# Patient Record
Sex: FEMALE | Race: WHITE | NOT HISPANIC OR LATINO
[De-identification: names, ages, dates, MRNs, and addresses within clinical notes are randomized per-mention and may not be internally consistent; named-entity substitution may affect disease eponyms.]

---

## 2022-01-01 ENCOUNTER — TRANSCRIPTION ENCOUNTER (OUTPATIENT)
Age: 87
End: 2022-01-01

## 2022-01-01 ENCOUNTER — RESULT REVIEW (OUTPATIENT)
Age: 87
End: 2022-01-01

## 2022-01-01 ENCOUNTER — INPATIENT (INPATIENT)
Facility: HOSPITAL | Age: 87
LOS: 9 days | Discharge: NURSING, REHAB OR LONG-TERM CARE | End: 2023-01-05
Attending: INTERNAL MEDICINE | Admitting: INTERNAL MEDICINE
Payer: MEDICARE

## 2022-01-01 ENCOUNTER — INPATIENT (INPATIENT)
Facility: HOSPITAL | Age: 87
LOS: 6 days | Discharge: HOME HEALTH | End: 2022-12-01
Attending: INTERNAL MEDICINE | Admitting: INTERNAL MEDICINE

## 2022-01-01 ENCOUNTER — INPATIENT (INPATIENT)
Facility: HOSPITAL | Age: 87
LOS: 3 days | Discharge: NURSING, REHAB OR LONG-TERM CARE | End: 2022-12-12
Attending: STUDENT IN AN ORGANIZED HEALTH CARE EDUCATION/TRAINING PROGRAM | Admitting: STUDENT IN AN ORGANIZED HEALTH CARE EDUCATION/TRAINING PROGRAM

## 2022-01-01 ENCOUNTER — APPOINTMENT (OUTPATIENT)
Dept: GASTROENTEROLOGY | Facility: CLINIC | Age: 87
End: 2022-01-01

## 2022-01-01 VITALS
OXYGEN SATURATION: 94 % | HEART RATE: 49 BPM | TEMPERATURE: 96 F | RESPIRATION RATE: 20 BRPM | SYSTOLIC BLOOD PRESSURE: 96 MMHG | DIASTOLIC BLOOD PRESSURE: 66 MMHG | HEIGHT: 55 IN

## 2022-01-01 VITALS
RESPIRATION RATE: 17 BRPM | TEMPERATURE: 98 F | DIASTOLIC BLOOD PRESSURE: 68 MMHG | SYSTOLIC BLOOD PRESSURE: 132 MMHG | OXYGEN SATURATION: 100 % | HEART RATE: 69 BPM

## 2022-01-01 VITALS
OXYGEN SATURATION: 97 % | DIASTOLIC BLOOD PRESSURE: 66 MMHG | SYSTOLIC BLOOD PRESSURE: 119 MMHG | TEMPERATURE: 98 F | RESPIRATION RATE: 16 BRPM | HEART RATE: 103 BPM

## 2022-01-01 VITALS
HEIGHT: 55 IN | OXYGEN SATURATION: 98 % | HEART RATE: 78 BPM | TEMPERATURE: 97 F | RESPIRATION RATE: 18 BRPM | SYSTOLIC BLOOD PRESSURE: 133 MMHG | DIASTOLIC BLOOD PRESSURE: 90 MMHG

## 2022-01-01 VITALS
BODY MASS INDEX: 23.37 KG/M2 | HEIGHT: 55 IN | SYSTOLIC BLOOD PRESSURE: 126 MMHG | OXYGEN SATURATION: 95 % | HEART RATE: 92 BPM | TEMPERATURE: 98.2 F | WEIGHT: 101 LBS | DIASTOLIC BLOOD PRESSURE: 73 MMHG

## 2022-01-01 VITALS
OXYGEN SATURATION: 100 % | TEMPERATURE: 97 F | HEART RATE: 69 BPM | SYSTOLIC BLOOD PRESSURE: 150 MMHG | DIASTOLIC BLOOD PRESSURE: 57 MMHG | RESPIRATION RATE: 20 BRPM

## 2022-01-01 DIAGNOSIS — K92.1 MELENA: ICD-10-CM

## 2022-01-01 DIAGNOSIS — E05.90 THYROTOXICOSIS, UNSPECIFIED WITHOUT THYROTOXIC CRISIS OR STORM: ICD-10-CM

## 2022-01-01 DIAGNOSIS — I50.9 HEART FAILURE, UNSPECIFIED: ICD-10-CM

## 2022-01-01 DIAGNOSIS — D64.9 ANEMIA, UNSPECIFIED: ICD-10-CM

## 2022-01-01 DIAGNOSIS — J10.1 INFLUENZA DUE TO OTHER IDENTIFIED INFLUENZA VIRUS WITH OTHER RESPIRATORY MANIFESTATIONS: ICD-10-CM

## 2022-01-01 DIAGNOSIS — I48.91 UNSPECIFIED ATRIAL FIBRILLATION: ICD-10-CM

## 2022-01-01 DIAGNOSIS — K52.9 NONINFECTIVE GASTROENTERITIS AND COLITIS, UNSPECIFIED: ICD-10-CM

## 2022-01-01 DIAGNOSIS — Z98.49 CATARACT EXTRACTION STATUS, UNSPECIFIED EYE: Chronic | ICD-10-CM

## 2022-01-01 DIAGNOSIS — R55 SYNCOPE AND COLLAPSE: ICD-10-CM

## 2022-01-01 DIAGNOSIS — E03.9 HYPOTHYROIDISM, UNSPECIFIED: ICD-10-CM

## 2022-01-01 DIAGNOSIS — E43 UNSPECIFIED SEVERE PROTEIN-CALORIE MALNUTRITION: ICD-10-CM

## 2022-01-01 DIAGNOSIS — L03.90 CELLULITIS, UNSPECIFIED: ICD-10-CM

## 2022-01-01 DIAGNOSIS — K64.9 UNSPECIFIED HEMORRHOIDS: ICD-10-CM

## 2022-01-01 DIAGNOSIS — K35.32 ACUTE APPENDICITIS WITH PERFORATION, LOCALIZED PERITONITIS, AND GANGRENE, WITHOUT ABSCESS: Chronic | ICD-10-CM

## 2022-01-01 DIAGNOSIS — K59.09 OTHER CONSTIPATION: ICD-10-CM

## 2022-01-01 DIAGNOSIS — Z90.49 ACQUIRED ABSENCE OF OTHER SPECIFIED PARTS OF DIGESTIVE TRACT: Chronic | ICD-10-CM

## 2022-01-01 DIAGNOSIS — I87.2 VENOUS INSUFFICIENCY (CHRONIC) (PERIPHERAL): ICD-10-CM

## 2022-01-01 DIAGNOSIS — D72.829 ELEVATED WHITE BLOOD CELL COUNT, UNSPECIFIED: ICD-10-CM

## 2022-01-01 DIAGNOSIS — R74.8 ABNORMAL LEVELS OF OTHER SERUM ENZYMES: ICD-10-CM

## 2022-01-01 DIAGNOSIS — Z29.9 ENCOUNTER FOR PROPHYLACTIC MEASURES, UNSPECIFIED: ICD-10-CM

## 2022-01-01 DIAGNOSIS — R13.10 DYSPHAGIA, UNSPECIFIED: ICD-10-CM

## 2022-01-01 DIAGNOSIS — Z71.89 OTHER SPECIFIED COUNSELING: ICD-10-CM

## 2022-01-01 DIAGNOSIS — I73.9 PERIPHERAL VASCULAR DISEASE, UNSPECIFIED: ICD-10-CM

## 2022-01-01 DIAGNOSIS — I99.8 OTHER DISORDER OF CIRCULATORY SYSTEM: ICD-10-CM

## 2022-01-01 DIAGNOSIS — R20.9 UNSPECIFIED DISTURBANCES OF SKIN SENSATION: ICD-10-CM

## 2022-01-01 DIAGNOSIS — K92.2 GASTROINTESTINAL HEMORRHAGE, UNSPECIFIED: ICD-10-CM

## 2022-01-01 DIAGNOSIS — J11.1 INFLUENZA DUE TO UNIDENTIFIED INFLUENZA VIRUS WITH OTHER RESPIRATORY MANIFESTATIONS: ICD-10-CM

## 2022-01-01 DIAGNOSIS — E78.5 HYPERLIPIDEMIA, UNSPECIFIED: ICD-10-CM

## 2022-01-01 DIAGNOSIS — R05.8 OTHER SPECIFIED COUGH: ICD-10-CM

## 2022-01-01 LAB
A1C WITH ESTIMATED AVERAGE GLUCOSE RESULT: 5.4 % — SIGNIFICANT CHANGE UP (ref 4–5.6)
ALBUMIN SERPL ELPH-MCNC: 1.9 G/DL — LOW (ref 3.3–5)
ALBUMIN SERPL ELPH-MCNC: 2.1 G/DL — LOW (ref 3.3–5)
ALBUMIN SERPL ELPH-MCNC: 2.5 G/DL — LOW (ref 3.3–5)
ALBUMIN SERPL ELPH-MCNC: 2.7 G/DL — LOW (ref 3.3–5)
ALBUMIN SERPL ELPH-MCNC: 3.4 G/DL — SIGNIFICANT CHANGE UP (ref 3.3–5)
ALBUMIN SERPL ELPH-MCNC: 3.5 G/DL — SIGNIFICANT CHANGE UP (ref 3.3–5)
ALBUMIN SERPL ELPH-MCNC: 3.7 G/DL — SIGNIFICANT CHANGE UP (ref 3.3–5)
ALP SERPL-CCNC: 142 U/L — HIGH (ref 40–120)
ALP SERPL-CCNC: 163 U/L — HIGH (ref 40–120)
ALP SERPL-CCNC: 163 U/L — HIGH (ref 40–120)
ALP SERPL-CCNC: 171 U/L — HIGH (ref 40–120)
ALP SERPL-CCNC: 80 U/L — SIGNIFICANT CHANGE UP (ref 40–120)
ALP SERPL-CCNC: 84 U/L — SIGNIFICANT CHANGE UP (ref 40–120)
ALP SERPL-CCNC: 84 U/L — SIGNIFICANT CHANGE UP (ref 40–120)
ALT FLD-CCNC: 10 U/L — SIGNIFICANT CHANGE UP (ref 4–33)
ALT FLD-CCNC: 11 U/L — SIGNIFICANT CHANGE UP (ref 4–33)
ALT FLD-CCNC: 12 U/L — SIGNIFICANT CHANGE UP (ref 4–33)
ALT FLD-CCNC: 13 U/L — SIGNIFICANT CHANGE UP (ref 4–33)
ALT FLD-CCNC: 25 U/L — SIGNIFICANT CHANGE UP (ref 4–33)
ALT FLD-CCNC: 32 U/L — SIGNIFICANT CHANGE UP (ref 4–33)
ALT FLD-CCNC: 32 U/L — SIGNIFICANT CHANGE UP (ref 4–33)
ANION GAP SERPL CALC-SCNC: 10 MMOL/L — SIGNIFICANT CHANGE UP (ref 7–14)
ANION GAP SERPL CALC-SCNC: 11 MMOL/L — SIGNIFICANT CHANGE UP (ref 7–14)
ANION GAP SERPL CALC-SCNC: 12 MMOL/L — SIGNIFICANT CHANGE UP (ref 7–14)
ANION GAP SERPL CALC-SCNC: 12 MMOL/L — SIGNIFICANT CHANGE UP (ref 7–14)
ANION GAP SERPL CALC-SCNC: 13 MMOL/L — SIGNIFICANT CHANGE UP (ref 7–14)
ANION GAP SERPL CALC-SCNC: 13 MMOL/L — SIGNIFICANT CHANGE UP (ref 7–14)
ANION GAP SERPL CALC-SCNC: 14 MMOL/L — SIGNIFICANT CHANGE UP (ref 7–14)
ANION GAP SERPL CALC-SCNC: 14 MMOL/L — SIGNIFICANT CHANGE UP (ref 7–14)
ANION GAP SERPL CALC-SCNC: 15 MMOL/L — HIGH (ref 7–14)
ANION GAP SERPL CALC-SCNC: 17 MMOL/L — HIGH (ref 7–14)
ANION GAP SERPL CALC-SCNC: 5 MMOL/L — LOW (ref 7–14)
ANION GAP SERPL CALC-SCNC: 5 MMOL/L — LOW (ref 7–14)
ANION GAP SERPL CALC-SCNC: 7 MMOL/L — SIGNIFICANT CHANGE UP (ref 7–14)
ANION GAP SERPL CALC-SCNC: 8 MMOL/L — SIGNIFICANT CHANGE UP (ref 7–14)
APPEARANCE UR: CLEAR — SIGNIFICANT CHANGE UP
APTT BLD: 34.8 SEC — SIGNIFICANT CHANGE UP (ref 27–36.3)
APTT BLD: 38.9 SEC — HIGH (ref 27–36.3)
AST SERPL-CCNC: 25 U/L — SIGNIFICANT CHANGE UP (ref 4–32)
AST SERPL-CCNC: 30 U/L — SIGNIFICANT CHANGE UP (ref 4–32)
AST SERPL-CCNC: 31 U/L — SIGNIFICANT CHANGE UP (ref 4–32)
AST SERPL-CCNC: 35 U/L — HIGH (ref 4–32)
AST SERPL-CCNC: 37 U/L — HIGH (ref 4–32)
AST SERPL-CCNC: 44 U/L — HIGH (ref 4–32)
AST SERPL-CCNC: 63 U/L — HIGH (ref 4–32)
B PERT DNA SPEC QL NAA+PROBE: SIGNIFICANT CHANGE UP
B PERT DNA SPEC QL NAA+PROBE: SIGNIFICANT CHANGE UP
B PERT+PARAPERT DNA PNL SPEC NAA+PROBE: SIGNIFICANT CHANGE UP
B PERT+PARAPERT DNA PNL SPEC NAA+PROBE: SIGNIFICANT CHANGE UP
BASE EXCESS BLDV CALC-SCNC: 7.6 MMOL/L — HIGH (ref -2–3)
BASOPHILS # BLD AUTO: 0.02 K/UL — SIGNIFICANT CHANGE UP (ref 0–0.2)
BASOPHILS # BLD AUTO: 0.03 K/UL — SIGNIFICANT CHANGE UP (ref 0–0.2)
BASOPHILS # BLD AUTO: 0.04 K/UL — SIGNIFICANT CHANGE UP (ref 0–0.2)
BASOPHILS NFR BLD AUTO: 0.1 % — SIGNIFICANT CHANGE UP (ref 0–2)
BASOPHILS NFR BLD AUTO: 0.2 % — SIGNIFICANT CHANGE UP (ref 0–2)
BASOPHILS NFR BLD AUTO: 0.4 % — SIGNIFICANT CHANGE UP (ref 0–2)
BASOPHILS NFR BLD AUTO: 0.5 % — SIGNIFICANT CHANGE UP (ref 0–2)
BILIRUB SERPL-MCNC: 0.3 MG/DL — SIGNIFICANT CHANGE UP (ref 0.2–1.2)
BILIRUB SERPL-MCNC: 0.4 MG/DL — SIGNIFICANT CHANGE UP (ref 0.2–1.2)
BILIRUB UR-MCNC: NEGATIVE — SIGNIFICANT CHANGE UP
BLD GP AB SCN SERPL QL: NEGATIVE — SIGNIFICANT CHANGE UP
BLOOD GAS VENOUS COMPREHENSIVE RESULT: SIGNIFICANT CHANGE UP
BORDETELLA PARAPERTUSSIS (RAPRVP): SIGNIFICANT CHANGE UP
BORDETELLA PARAPERTUSSIS (RAPRVP): SIGNIFICANT CHANGE UP
BUN SERPL-MCNC: 10 MG/DL — SIGNIFICANT CHANGE UP (ref 7–23)
BUN SERPL-MCNC: 12 MG/DL — SIGNIFICANT CHANGE UP (ref 7–23)
BUN SERPL-MCNC: 12 MG/DL — SIGNIFICANT CHANGE UP (ref 7–23)
BUN SERPL-MCNC: 14 MG/DL — SIGNIFICANT CHANGE UP (ref 7–23)
BUN SERPL-MCNC: 15 MG/DL — SIGNIFICANT CHANGE UP (ref 7–23)
BUN SERPL-MCNC: 18 MG/DL — SIGNIFICANT CHANGE UP (ref 7–23)
BUN SERPL-MCNC: 18 MG/DL — SIGNIFICANT CHANGE UP (ref 7–23)
BUN SERPL-MCNC: 20 MG/DL — SIGNIFICANT CHANGE UP (ref 7–23)
BUN SERPL-MCNC: 21 MG/DL — SIGNIFICANT CHANGE UP (ref 7–23)
BUN SERPL-MCNC: 22 MG/DL — SIGNIFICANT CHANGE UP (ref 7–23)
BUN SERPL-MCNC: 4 MG/DL — LOW (ref 7–23)
BUN SERPL-MCNC: 4 MG/DL — LOW (ref 7–23)
BUN SERPL-MCNC: 5 MG/DL — LOW (ref 7–23)
BUN SERPL-MCNC: 6 MG/DL — LOW (ref 7–23)
BUN SERPL-MCNC: 7 MG/DL — SIGNIFICANT CHANGE UP (ref 7–23)
BUN SERPL-MCNC: 7 MG/DL — SIGNIFICANT CHANGE UP (ref 7–23)
BUN SERPL-MCNC: 8 MG/DL — SIGNIFICANT CHANGE UP (ref 7–23)
BUN SERPL-MCNC: 9 MG/DL — SIGNIFICANT CHANGE UP (ref 7–23)
C DIFF BY PCR RESULT: SIGNIFICANT CHANGE UP
C PNEUM DNA SPEC QL NAA+PROBE: SIGNIFICANT CHANGE UP
C PNEUM DNA SPEC QL NAA+PROBE: SIGNIFICANT CHANGE UP
CALCIUM SERPL-MCNC: 7.2 MG/DL — LOW (ref 8.4–10.5)
CALCIUM SERPL-MCNC: 7.4 MG/DL — LOW (ref 8.4–10.5)
CALCIUM SERPL-MCNC: 7.5 MG/DL — LOW (ref 8.4–10.5)
CALCIUM SERPL-MCNC: 7.7 MG/DL — LOW (ref 8.4–10.5)
CALCIUM SERPL-MCNC: 7.7 MG/DL — LOW (ref 8.4–10.5)
CALCIUM SERPL-MCNC: 7.8 MG/DL — LOW (ref 8.4–10.5)
CALCIUM SERPL-MCNC: 7.9 MG/DL — LOW (ref 8.4–10.5)
CALCIUM SERPL-MCNC: 8 MG/DL — LOW (ref 8.4–10.5)
CALCIUM SERPL-MCNC: 8.1 MG/DL — LOW (ref 8.4–10.5)
CALCIUM SERPL-MCNC: 8.2 MG/DL — LOW (ref 8.4–10.5)
CALCIUM SERPL-MCNC: 8.2 MG/DL — LOW (ref 8.4–10.5)
CALCIUM SERPL-MCNC: 8.3 MG/DL — LOW (ref 8.4–10.5)
CALCIUM SERPL-MCNC: 8.4 MG/DL — SIGNIFICANT CHANGE UP (ref 8.4–10.5)
CALCIUM SERPL-MCNC: 8.6 MG/DL — SIGNIFICANT CHANGE UP (ref 8.4–10.5)
CALCIUM SERPL-MCNC: 8.8 MG/DL — SIGNIFICANT CHANGE UP (ref 8.4–10.5)
CALCIUM SERPL-MCNC: 8.9 MG/DL — SIGNIFICANT CHANGE UP (ref 8.4–10.5)
CALCIUM SERPL-MCNC: 9.2 MG/DL — SIGNIFICANT CHANGE UP (ref 8.4–10.5)
CALCIUM SERPL-MCNC: 9.5 MG/DL — SIGNIFICANT CHANGE UP (ref 8.4–10.5)
CHLORIDE BLDV-SCNC: 95 MMOL/L — LOW (ref 96–108)
CHLORIDE SERPL-SCNC: 101 MMOL/L — SIGNIFICANT CHANGE UP (ref 98–107)
CHLORIDE SERPL-SCNC: 102 MMOL/L — SIGNIFICANT CHANGE UP (ref 98–107)
CHLORIDE SERPL-SCNC: 103 MMOL/L — SIGNIFICANT CHANGE UP (ref 98–107)
CHLORIDE SERPL-SCNC: 103 MMOL/L — SIGNIFICANT CHANGE UP (ref 98–107)
CHLORIDE SERPL-SCNC: 106 MMOL/L — SIGNIFICANT CHANGE UP (ref 98–107)
CHLORIDE SERPL-SCNC: 108 MMOL/L — HIGH (ref 98–107)
CHLORIDE SERPL-SCNC: 108 MMOL/L — HIGH (ref 98–107)
CHLORIDE SERPL-SCNC: 109 MMOL/L — HIGH (ref 98–107)
CHLORIDE SERPL-SCNC: 113 MMOL/L — HIGH (ref 98–107)
CHLORIDE SERPL-SCNC: 114 MMOL/L — HIGH (ref 98–107)
CHLORIDE SERPL-SCNC: 91 MMOL/L — LOW (ref 98–107)
CHLORIDE SERPL-SCNC: 93 MMOL/L — LOW (ref 98–107)
CHLORIDE SERPL-SCNC: 96 MMOL/L — LOW (ref 98–107)
CHLORIDE SERPL-SCNC: 97 MMOL/L — LOW (ref 98–107)
CHLORIDE SERPL-SCNC: 97 MMOL/L — LOW (ref 98–107)
CHLORIDE SERPL-SCNC: 98 MMOL/L — SIGNIFICANT CHANGE UP (ref 98–107)
CHLORIDE SERPL-SCNC: 99 MMOL/L — SIGNIFICANT CHANGE UP (ref 98–107)
CHOLEST SERPL-MCNC: 98 MG/DL — SIGNIFICANT CHANGE UP
CK SERPL-CCNC: 34 U/L — SIGNIFICANT CHANGE UP (ref 25–170)
CO2 BLDV-SCNC: 34.7 MMOL/L — HIGH (ref 22–26)
CO2 SERPL-SCNC: 22 MMOL/L — SIGNIFICANT CHANGE UP (ref 22–31)
CO2 SERPL-SCNC: 23 MMOL/L — SIGNIFICANT CHANGE UP (ref 22–31)
CO2 SERPL-SCNC: 23 MMOL/L — SIGNIFICANT CHANGE UP (ref 22–31)
CO2 SERPL-SCNC: 24 MMOL/L — SIGNIFICANT CHANGE UP (ref 22–31)
CO2 SERPL-SCNC: 24 MMOL/L — SIGNIFICANT CHANGE UP (ref 22–31)
CO2 SERPL-SCNC: 25 MMOL/L — SIGNIFICANT CHANGE UP (ref 22–31)
CO2 SERPL-SCNC: 26 MMOL/L — SIGNIFICANT CHANGE UP (ref 22–31)
CO2 SERPL-SCNC: 26 MMOL/L — SIGNIFICANT CHANGE UP (ref 22–31)
CO2 SERPL-SCNC: 27 MMOL/L — SIGNIFICANT CHANGE UP (ref 22–31)
CO2 SERPL-SCNC: 28 MMOL/L — SIGNIFICANT CHANGE UP (ref 22–31)
CO2 SERPL-SCNC: 29 MMOL/L — SIGNIFICANT CHANGE UP (ref 22–31)
CO2 SERPL-SCNC: 30 MMOL/L — SIGNIFICANT CHANGE UP (ref 22–31)
CO2 SERPL-SCNC: 31 MMOL/L — SIGNIFICANT CHANGE UP (ref 22–31)
CO2 SERPL-SCNC: 32 MMOL/L — HIGH (ref 22–31)
COLOR SPEC: YELLOW — SIGNIFICANT CHANGE UP
CREAT ?TM UR-MCNC: 66 MG/DL — SIGNIFICANT CHANGE UP
CREAT SERPL-MCNC: 0.45 MG/DL — LOW (ref 0.5–1.3)
CREAT SERPL-MCNC: 0.47 MG/DL — LOW (ref 0.5–1.3)
CREAT SERPL-MCNC: 0.55 MG/DL — SIGNIFICANT CHANGE UP (ref 0.5–1.3)
CREAT SERPL-MCNC: 0.6 MG/DL — SIGNIFICANT CHANGE UP (ref 0.5–1.3)
CREAT SERPL-MCNC: 0.62 MG/DL — SIGNIFICANT CHANGE UP (ref 0.5–1.3)
CREAT SERPL-MCNC: 0.62 MG/DL — SIGNIFICANT CHANGE UP (ref 0.5–1.3)
CREAT SERPL-MCNC: 0.63 MG/DL — SIGNIFICANT CHANGE UP (ref 0.5–1.3)
CREAT SERPL-MCNC: 0.63 MG/DL — SIGNIFICANT CHANGE UP (ref 0.5–1.3)
CREAT SERPL-MCNC: 0.64 MG/DL — SIGNIFICANT CHANGE UP (ref 0.5–1.3)
CREAT SERPL-MCNC: 0.64 MG/DL — SIGNIFICANT CHANGE UP (ref 0.5–1.3)
CREAT SERPL-MCNC: 0.65 MG/DL — SIGNIFICANT CHANGE UP (ref 0.5–1.3)
CREAT SERPL-MCNC: 0.67 MG/DL — SIGNIFICANT CHANGE UP (ref 0.5–1.3)
CREAT SERPL-MCNC: 0.7 MG/DL — SIGNIFICANT CHANGE UP (ref 0.5–1.3)
CREAT SERPL-MCNC: 0.71 MG/DL — SIGNIFICANT CHANGE UP (ref 0.5–1.3)
CREAT SERPL-MCNC: 0.72 MG/DL — SIGNIFICANT CHANGE UP (ref 0.5–1.3)
CREAT SERPL-MCNC: 0.75 MG/DL — SIGNIFICANT CHANGE UP (ref 0.5–1.3)
CREAT SERPL-MCNC: 0.76 MG/DL — SIGNIFICANT CHANGE UP (ref 0.5–1.3)
CREAT SERPL-MCNC: 0.76 MG/DL — SIGNIFICANT CHANGE UP (ref 0.5–1.3)
CULTURE RESULTS: SIGNIFICANT CHANGE UP
DIFF PNL FLD: NEGATIVE — SIGNIFICANT CHANGE UP
EGFR: 76 ML/MIN/1.73M2 — SIGNIFICANT CHANGE UP
EGFR: 76 ML/MIN/1.73M2 — SIGNIFICANT CHANGE UP
EGFR: 77 ML/MIN/1.73M2 — SIGNIFICANT CHANGE UP
EGFR: 81 ML/MIN/1.73M2 — SIGNIFICANT CHANGE UP
EGFR: 83 ML/MIN/1.73M2 — SIGNIFICANT CHANGE UP
EGFR: 84 ML/MIN/1.73M2 — SIGNIFICANT CHANGE UP
EGFR: 85 ML/MIN/1.73M2 — SIGNIFICANT CHANGE UP
EGFR: 86 ML/MIN/1.73M2 — SIGNIFICANT CHANGE UP
EGFR: 87 ML/MIN/1.73M2 — SIGNIFICANT CHANGE UP
EGFR: 89 ML/MIN/1.73M2 — SIGNIFICANT CHANGE UP
EGFR: 93 ML/MIN/1.73M2 — SIGNIFICANT CHANGE UP
EGFR: 94 ML/MIN/1.73M2 — SIGNIFICANT CHANGE UP
EOSINOPHIL # BLD AUTO: 0 K/UL — SIGNIFICANT CHANGE UP (ref 0–0.5)
EOSINOPHIL # BLD AUTO: 0.01 K/UL — SIGNIFICANT CHANGE UP (ref 0–0.5)
EOSINOPHIL # BLD AUTO: 0.04 K/UL — SIGNIFICANT CHANGE UP (ref 0–0.5)
EOSINOPHIL # BLD AUTO: 0.13 K/UL — SIGNIFICANT CHANGE UP (ref 0–0.5)
EOSINOPHIL NFR BLD AUTO: 0 % — SIGNIFICANT CHANGE UP (ref 0–6)
EOSINOPHIL NFR BLD AUTO: 0.1 % — SIGNIFICANT CHANGE UP (ref 0–6)
EOSINOPHIL NFR BLD AUTO: 0.2 % — SIGNIFICANT CHANGE UP (ref 0–6)
EOSINOPHIL NFR BLD AUTO: 1.3 % — SIGNIFICANT CHANGE UP (ref 0–6)
ESTIMATED AVERAGE GLUCOSE: 108 — SIGNIFICANT CHANGE UP
FERRITIN SERPL-MCNC: 55 NG/ML — SIGNIFICANT CHANGE UP (ref 15–150)
FLUAV AG NPH QL: SIGNIFICANT CHANGE UP
FLUAV AG NPH QL: SIGNIFICANT CHANGE UP
FLUAV H1 2009 PAND RNA SPEC QL NAA+PROBE: DETECTED
FLUAV H1 2009 PAND RNA SPEC QL NAA+PROBE: SIGNIFICANT CHANGE UP
FLUBV AG NPH QL: SIGNIFICANT CHANGE UP
FLUBV AG NPH QL: SIGNIFICANT CHANGE UP
FLUBV RNA SPEC QL NAA+PROBE: SIGNIFICANT CHANGE UP
FLUBV RNA SPEC QL NAA+PROBE: SIGNIFICANT CHANGE UP
FOLATE SERPL-MCNC: >20 NG/ML — HIGH (ref 3.1–17.5)
GAS PNL BLDV: 131 MMOL/L — LOW (ref 136–145)
GLUCOSE BLDC GLUCOMTR-MCNC: 110 MG/DL — HIGH (ref 70–99)
GLUCOSE BLDC GLUCOMTR-MCNC: 117 MG/DL — HIGH (ref 70–99)
GLUCOSE BLDC GLUCOMTR-MCNC: 119 MG/DL — HIGH (ref 70–99)
GLUCOSE BLDC GLUCOMTR-MCNC: 122 MG/DL — HIGH (ref 70–99)
GLUCOSE BLDC GLUCOMTR-MCNC: 128 MG/DL — HIGH (ref 70–99)
GLUCOSE BLDC GLUCOMTR-MCNC: 128 MG/DL — HIGH (ref 70–99)
GLUCOSE BLDC GLUCOMTR-MCNC: 131 MG/DL — HIGH (ref 70–99)
GLUCOSE BLDC GLUCOMTR-MCNC: 131 MG/DL — HIGH (ref 70–99)
GLUCOSE BLDC GLUCOMTR-MCNC: 141 MG/DL — HIGH (ref 70–99)
GLUCOSE BLDC GLUCOMTR-MCNC: 147 MG/DL — HIGH (ref 70–99)
GLUCOSE BLDC GLUCOMTR-MCNC: 95 MG/DL — SIGNIFICANT CHANGE UP (ref 70–99)
GLUCOSE BLDV-MCNC: 110 MG/DL — HIGH (ref 70–99)
GLUCOSE SERPL-MCNC: 100 MG/DL — HIGH (ref 70–99)
GLUCOSE SERPL-MCNC: 101 MG/DL — HIGH (ref 70–99)
GLUCOSE SERPL-MCNC: 102 MG/DL — HIGH (ref 70–99)
GLUCOSE SERPL-MCNC: 105 MG/DL — HIGH (ref 70–99)
GLUCOSE SERPL-MCNC: 108 MG/DL — HIGH (ref 70–99)
GLUCOSE SERPL-MCNC: 108 MG/DL — HIGH (ref 70–99)
GLUCOSE SERPL-MCNC: 117 MG/DL — HIGH (ref 70–99)
GLUCOSE SERPL-MCNC: 121 MG/DL — HIGH (ref 70–99)
GLUCOSE SERPL-MCNC: 122 MG/DL — HIGH (ref 70–99)
GLUCOSE SERPL-MCNC: 130 MG/DL — HIGH (ref 70–99)
GLUCOSE SERPL-MCNC: 137 MG/DL — HIGH (ref 70–99)
GLUCOSE SERPL-MCNC: 68 MG/DL — LOW (ref 70–99)
GLUCOSE SERPL-MCNC: 79 MG/DL — SIGNIFICANT CHANGE UP (ref 70–99)
GLUCOSE SERPL-MCNC: 82 MG/DL — SIGNIFICANT CHANGE UP (ref 70–99)
GLUCOSE SERPL-MCNC: 82 MG/DL — SIGNIFICANT CHANGE UP (ref 70–99)
GLUCOSE SERPL-MCNC: 84 MG/DL — SIGNIFICANT CHANGE UP (ref 70–99)
GLUCOSE SERPL-MCNC: 89 MG/DL — SIGNIFICANT CHANGE UP (ref 70–99)
GLUCOSE SERPL-MCNC: 92 MG/DL — SIGNIFICANT CHANGE UP (ref 70–99)
GLUCOSE SERPL-MCNC: 93 MG/DL — SIGNIFICANT CHANGE UP (ref 70–99)
GLUCOSE SERPL-MCNC: 96 MG/DL — SIGNIFICANT CHANGE UP (ref 70–99)
GLUCOSE UR QL: NEGATIVE — SIGNIFICANT CHANGE UP
HADV DNA SPEC QL NAA+PROBE: SIGNIFICANT CHANGE UP
HADV DNA SPEC QL NAA+PROBE: SIGNIFICANT CHANGE UP
HCO3 BLDV-SCNC: 33 MMOL/L — HIGH (ref 22–29)
HCOV 229E RNA SPEC QL NAA+PROBE: SIGNIFICANT CHANGE UP
HCOV 229E RNA SPEC QL NAA+PROBE: SIGNIFICANT CHANGE UP
HCOV HKU1 RNA SPEC QL NAA+PROBE: SIGNIFICANT CHANGE UP
HCOV HKU1 RNA SPEC QL NAA+PROBE: SIGNIFICANT CHANGE UP
HCOV NL63 RNA SPEC QL NAA+PROBE: SIGNIFICANT CHANGE UP
HCOV NL63 RNA SPEC QL NAA+PROBE: SIGNIFICANT CHANGE UP
HCOV OC43 RNA SPEC QL NAA+PROBE: SIGNIFICANT CHANGE UP
HCOV OC43 RNA SPEC QL NAA+PROBE: SIGNIFICANT CHANGE UP
HCT VFR BLD CALC: 29.2 % — LOW (ref 34.5–45)
HCT VFR BLD CALC: 29.4 % — LOW (ref 34.5–45)
HCT VFR BLD CALC: 29.5 % — LOW (ref 34.5–45)
HCT VFR BLD CALC: 29.9 % — LOW (ref 34.5–45)
HCT VFR BLD CALC: 30 % — LOW (ref 34.5–45)
HCT VFR BLD CALC: 30.3 % — LOW (ref 34.5–45)
HCT VFR BLD CALC: 30.4 % — LOW (ref 34.5–45)
HCT VFR BLD CALC: 30.4 % — LOW (ref 34.5–45)
HCT VFR BLD CALC: 30.5 % — LOW (ref 34.5–45)
HCT VFR BLD CALC: 31.3 % — LOW (ref 34.5–45)
HCT VFR BLD CALC: 31.6 % — LOW (ref 34.5–45)
HCT VFR BLD CALC: 31.7 % — LOW (ref 34.5–45)
HCT VFR BLD CALC: 32.1 % — LOW (ref 34.5–45)
HCT VFR BLD CALC: 32.1 % — LOW (ref 34.5–45)
HCT VFR BLD CALC: 32.6 % — LOW (ref 34.5–45)
HCT VFR BLD CALC: 33.6 % — LOW (ref 34.5–45)
HCT VFR BLD CALC: 36 % — SIGNIFICANT CHANGE UP (ref 34.5–45)
HCT VFR BLD CALC: 36.2 % — SIGNIFICANT CHANGE UP (ref 34.5–45)
HCT VFR BLD CALC: 36.3 % — SIGNIFICANT CHANGE UP (ref 34.5–45)
HCT VFR BLD CALC: 36.4 % — SIGNIFICANT CHANGE UP (ref 34.5–45)
HCT VFR BLD CALC: 37.7 % — SIGNIFICANT CHANGE UP (ref 34.5–45)
HCT VFR BLDA CALC: 27 % — LOW (ref 34.5–46.5)
HDLC SERPL-MCNC: 33 MG/DL — LOW
HEMOLYSIS INDEX: 216 — SIGNIFICANT CHANGE UP
HGB BLD CALC-MCNC: 8.9 G/DL — LOW (ref 11.5–15.5)
HGB BLD-MCNC: 10 G/DL — LOW (ref 11.5–15.5)
HGB BLD-MCNC: 10.1 G/DL — LOW (ref 11.5–15.5)
HGB BLD-MCNC: 10.1 G/DL — LOW (ref 11.5–15.5)
HGB BLD-MCNC: 10.3 G/DL — LOW (ref 11.5–15.5)
HGB BLD-MCNC: 10.3 G/DL — LOW (ref 11.5–15.5)
HGB BLD-MCNC: 11 G/DL — LOW (ref 11.5–15.5)
HGB BLD-MCNC: 11 G/DL — LOW (ref 11.5–15.5)
HGB BLD-MCNC: 11.2 G/DL — LOW (ref 11.5–15.5)
HGB BLD-MCNC: 11.2 G/DL — LOW (ref 11.5–15.5)
HGB BLD-MCNC: 11.6 G/DL — SIGNIFICANT CHANGE UP (ref 11.5–15.5)
HGB BLD-MCNC: 9.2 G/DL — LOW (ref 11.5–15.5)
HGB BLD-MCNC: 9.4 G/DL — LOW (ref 11.5–15.5)
HGB BLD-MCNC: 9.5 G/DL — LOW (ref 11.5–15.5)
HGB BLD-MCNC: 9.8 G/DL — LOW (ref 11.5–15.5)
HMPV RNA SPEC QL NAA+PROBE: SIGNIFICANT CHANGE UP
HMPV RNA SPEC QL NAA+PROBE: SIGNIFICANT CHANGE UP
HPIV1 RNA SPEC QL NAA+PROBE: SIGNIFICANT CHANGE UP
HPIV1 RNA SPEC QL NAA+PROBE: SIGNIFICANT CHANGE UP
HPIV2 RNA SPEC QL NAA+PROBE: SIGNIFICANT CHANGE UP
HPIV2 RNA SPEC QL NAA+PROBE: SIGNIFICANT CHANGE UP
HPIV3 RNA SPEC QL NAA+PROBE: SIGNIFICANT CHANGE UP
HPIV3 RNA SPEC QL NAA+PROBE: SIGNIFICANT CHANGE UP
HPIV4 RNA SPEC QL NAA+PROBE: SIGNIFICANT CHANGE UP
HPIV4 RNA SPEC QL NAA+PROBE: SIGNIFICANT CHANGE UP
IANC: 11.43 K/UL — HIGH (ref 1.8–7.4)
IANC: 12.83 K/UL — HIGH (ref 1.8–7.4)
IANC: 14.22 K/UL — HIGH (ref 1.8–7.4)
IANC: 6.23 K/UL — SIGNIFICANT CHANGE UP (ref 1.8–7.4)
IANC: 6.63 K/UL — SIGNIFICANT CHANGE UP (ref 1.8–7.4)
IANC: 9.26 K/UL — HIGH (ref 1.8–7.4)
IANC: 9.6 K/UL — HIGH (ref 1.8–7.4)
IMM GRANULOCYTES NFR BLD AUTO: 0.5 % — SIGNIFICANT CHANGE UP (ref 0–0.9)
IMM GRANULOCYTES NFR BLD AUTO: 0.6 % — SIGNIFICANT CHANGE UP (ref 0–0.9)
INR BLD: 2.21 RATIO — HIGH (ref 0.88–1.16)
INR BLD: 2.63 RATIO — HIGH (ref 0.88–1.16)
INR BLD: 3.3 RATIO — HIGH (ref 0.88–1.16)
IRON SATN MFR SERPL: 34 UG/DL — SIGNIFICANT CHANGE UP (ref 30–160)
IRON SATN MFR SERPL: 9 % — LOW (ref 14–50)
KETONES UR-MCNC: NEGATIVE — SIGNIFICANT CHANGE UP
LACTATE BLDV-MCNC: 1.4 MMOL/L — SIGNIFICANT CHANGE UP (ref 0.5–2)
LEUKOCYTE ESTERASE UR-ACNC: NEGATIVE — SIGNIFICANT CHANGE UP
LIDOCAIN IGE QN: 16 U/L — SIGNIFICANT CHANGE UP (ref 7–60)
LIPID PNL WITH DIRECT LDL SERPL: 52 MG/DL — SIGNIFICANT CHANGE UP
LYMPHOCYTES # BLD AUTO: 0.94 K/UL — LOW (ref 1–3.3)
LYMPHOCYTES # BLD AUTO: 1.12 K/UL — SIGNIFICANT CHANGE UP (ref 1–3.3)
LYMPHOCYTES # BLD AUTO: 1.2 K/UL — SIGNIFICANT CHANGE UP (ref 1–3.3)
LYMPHOCYTES # BLD AUTO: 1.22 K/UL — SIGNIFICANT CHANGE UP (ref 1–3.3)
LYMPHOCYTES # BLD AUTO: 1.92 K/UL — SIGNIFICANT CHANGE UP (ref 1–3.3)
LYMPHOCYTES # BLD AUTO: 14.2 % — SIGNIFICANT CHANGE UP (ref 13–44)
LYMPHOCYTES # BLD AUTO: 14.6 % — SIGNIFICANT CHANGE UP (ref 13–44)
LYMPHOCYTES # BLD AUTO: 15.6 % — SIGNIFICANT CHANGE UP (ref 13–44)
LYMPHOCYTES # BLD AUTO: 2.08 K/UL — SIGNIFICANT CHANGE UP (ref 1–3.3)
LYMPHOCYTES # BLD AUTO: 2.44 K/UL — SIGNIFICANT CHANGE UP (ref 1–3.3)
LYMPHOCYTES # BLD AUTO: 20.8 % — SIGNIFICANT CHANGE UP (ref 13–44)
LYMPHOCYTES # BLD AUTO: 6.5 % — LOW (ref 13–44)
LYMPHOCYTES # BLD AUTO: 7.2 % — LOW (ref 13–44)
LYMPHOCYTES # BLD AUTO: 9.8 % — LOW (ref 13–44)
M PNEUMO DNA SPEC QL NAA+PROBE: SIGNIFICANT CHANGE UP
M PNEUMO DNA SPEC QL NAA+PROBE: SIGNIFICANT CHANGE UP
MAGNESIUM SERPL-MCNC: 1.5 MG/DL — LOW (ref 1.6–2.6)
MAGNESIUM SERPL-MCNC: 1.6 MG/DL — SIGNIFICANT CHANGE UP (ref 1.6–2.6)
MAGNESIUM SERPL-MCNC: 1.6 MG/DL — SIGNIFICANT CHANGE UP (ref 1.6–2.6)
MAGNESIUM SERPL-MCNC: 1.7 MG/DL — SIGNIFICANT CHANGE UP (ref 1.6–2.6)
MAGNESIUM SERPL-MCNC: 1.7 MG/DL — SIGNIFICANT CHANGE UP (ref 1.6–2.6)
MAGNESIUM SERPL-MCNC: 1.8 MG/DL — SIGNIFICANT CHANGE UP (ref 1.6–2.6)
MAGNESIUM SERPL-MCNC: 1.9 MG/DL — SIGNIFICANT CHANGE UP (ref 1.6–2.6)
MAGNESIUM SERPL-MCNC: 2 MG/DL — SIGNIFICANT CHANGE UP (ref 1.6–2.6)
MAGNESIUM SERPL-MCNC: 2 MG/DL — SIGNIFICANT CHANGE UP (ref 1.6–2.6)
MAGNESIUM SERPL-MCNC: 2.1 MG/DL — SIGNIFICANT CHANGE UP (ref 1.6–2.6)
MCHC RBC-ENTMCNC: 28 PG — SIGNIFICANT CHANGE UP (ref 27–34)
MCHC RBC-ENTMCNC: 28.1 PG — SIGNIFICANT CHANGE UP (ref 27–34)
MCHC RBC-ENTMCNC: 28.1 PG — SIGNIFICANT CHANGE UP (ref 27–34)
MCHC RBC-ENTMCNC: 28.2 PG — SIGNIFICANT CHANGE UP (ref 27–34)
MCHC RBC-ENTMCNC: 28.3 PG — SIGNIFICANT CHANGE UP (ref 27–34)
MCHC RBC-ENTMCNC: 28.3 PG — SIGNIFICANT CHANGE UP (ref 27–34)
MCHC RBC-ENTMCNC: 28.4 PG — SIGNIFICANT CHANGE UP (ref 27–34)
MCHC RBC-ENTMCNC: 28.5 PG — SIGNIFICANT CHANGE UP (ref 27–34)
MCHC RBC-ENTMCNC: 28.6 PG — SIGNIFICANT CHANGE UP (ref 27–34)
MCHC RBC-ENTMCNC: 28.6 PG — SIGNIFICANT CHANGE UP (ref 27–34)
MCHC RBC-ENTMCNC: 28.7 PG — SIGNIFICANT CHANGE UP (ref 27–34)
MCHC RBC-ENTMCNC: 28.7 PG — SIGNIFICANT CHANGE UP (ref 27–34)
MCHC RBC-ENTMCNC: 28.8 PG — SIGNIFICANT CHANGE UP (ref 27–34)
MCHC RBC-ENTMCNC: 28.9 PG — SIGNIFICANT CHANGE UP (ref 27–34)
MCHC RBC-ENTMCNC: 28.9 PG — SIGNIFICANT CHANGE UP (ref 27–34)
MCHC RBC-ENTMCNC: 29 PG — SIGNIFICANT CHANGE UP (ref 27–34)
MCHC RBC-ENTMCNC: 29.1 PG — SIGNIFICANT CHANGE UP (ref 27–34)
MCHC RBC-ENTMCNC: 29.1 PG — SIGNIFICANT CHANGE UP (ref 27–34)
MCHC RBC-ENTMCNC: 29.2 PG — SIGNIFICANT CHANGE UP (ref 27–34)
MCHC RBC-ENTMCNC: 29.3 GM/DL — LOW (ref 32–36)
MCHC RBC-ENTMCNC: 29.7 GM/DL — LOW (ref 32–36)
MCHC RBC-ENTMCNC: 30.2 GM/DL — LOW (ref 32–36)
MCHC RBC-ENTMCNC: 30.3 GM/DL — LOW (ref 32–36)
MCHC RBC-ENTMCNC: 30.3 GM/DL — LOW (ref 32–36)
MCHC RBC-ENTMCNC: 30.4 GM/DL — LOW (ref 32–36)
MCHC RBC-ENTMCNC: 30.6 GM/DL — LOW (ref 32–36)
MCHC RBC-ENTMCNC: 30.7 GM/DL — LOW (ref 32–36)
MCHC RBC-ENTMCNC: 30.9 GM/DL — LOW (ref 32–36)
MCHC RBC-ENTMCNC: 31.5 GM/DL — LOW (ref 32–36)
MCHC RBC-ENTMCNC: 31.6 GM/DL — LOW (ref 32–36)
MCHC RBC-ENTMCNC: 31.6 GM/DL — LOW (ref 32–36)
MCHC RBC-ENTMCNC: 31.7 GM/DL — LOW (ref 32–36)
MCHC RBC-ENTMCNC: 31.8 GM/DL — LOW (ref 32–36)
MCHC RBC-ENTMCNC: 31.9 GM/DL — LOW (ref 32–36)
MCHC RBC-ENTMCNC: 31.9 GM/DL — LOW (ref 32–36)
MCHC RBC-ENTMCNC: 32 GM/DL — SIGNIFICANT CHANGE UP (ref 32–36)
MCHC RBC-ENTMCNC: 32.3 GM/DL — SIGNIFICANT CHANGE UP (ref 32–36)
MCHC RBC-ENTMCNC: 32.5 GM/DL — SIGNIFICANT CHANGE UP (ref 32–36)
MCV RBC AUTO: 89.3 FL — SIGNIFICANT CHANGE UP (ref 80–100)
MCV RBC AUTO: 89.3 FL — SIGNIFICANT CHANGE UP (ref 80–100)
MCV RBC AUTO: 89.7 FL — SIGNIFICANT CHANGE UP (ref 80–100)
MCV RBC AUTO: 90.2 FL — SIGNIFICANT CHANGE UP (ref 80–100)
MCV RBC AUTO: 90.2 FL — SIGNIFICANT CHANGE UP (ref 80–100)
MCV RBC AUTO: 90.3 FL — SIGNIFICANT CHANGE UP (ref 80–100)
MCV RBC AUTO: 90.3 FL — SIGNIFICANT CHANGE UP (ref 80–100)
MCV RBC AUTO: 91.1 FL — SIGNIFICANT CHANGE UP (ref 80–100)
MCV RBC AUTO: 91.5 FL — SIGNIFICANT CHANGE UP (ref 80–100)
MCV RBC AUTO: 91.5 FL — SIGNIFICANT CHANGE UP (ref 80–100)
MCV RBC AUTO: 92.4 FL — SIGNIFICANT CHANGE UP (ref 80–100)
MCV RBC AUTO: 92.4 FL — SIGNIFICANT CHANGE UP (ref 80–100)
MCV RBC AUTO: 92.5 FL — SIGNIFICANT CHANGE UP (ref 80–100)
MCV RBC AUTO: 92.6 FL — SIGNIFICANT CHANGE UP (ref 80–100)
MCV RBC AUTO: 93.3 FL — SIGNIFICANT CHANGE UP (ref 80–100)
MCV RBC AUTO: 94.1 FL — SIGNIFICANT CHANGE UP (ref 80–100)
MCV RBC AUTO: 94.1 FL — SIGNIFICANT CHANGE UP (ref 80–100)
MCV RBC AUTO: 94.4 FL — SIGNIFICANT CHANGE UP (ref 80–100)
MCV RBC AUTO: 94.4 FL — SIGNIFICANT CHANGE UP (ref 80–100)
MCV RBC AUTO: 94.7 FL — SIGNIFICANT CHANGE UP (ref 80–100)
MCV RBC AUTO: 95.8 FL — SIGNIFICANT CHANGE UP (ref 80–100)
MONOCYTES # BLD AUTO: 0.63 K/UL — SIGNIFICANT CHANGE UP (ref 0–0.9)
MONOCYTES # BLD AUTO: 0.65 K/UL — SIGNIFICANT CHANGE UP (ref 0–0.9)
MONOCYTES # BLD AUTO: 0.95 K/UL — HIGH (ref 0–0.9)
MONOCYTES # BLD AUTO: 1.05 K/UL — HIGH (ref 0–0.9)
MONOCYTES # BLD AUTO: 1.41 K/UL — HIGH (ref 0–0.9)
MONOCYTES # BLD AUTO: 1.62 K/UL — HIGH (ref 0–0.9)
MONOCYTES # BLD AUTO: 1.85 K/UL — HIGH (ref 0–0.9)
MONOCYTES NFR BLD AUTO: 10.4 % — SIGNIFICANT CHANGE UP (ref 2–14)
MONOCYTES NFR BLD AUTO: 10.5 % — SIGNIFICANT CHANGE UP (ref 2–14)
MONOCYTES NFR BLD AUTO: 11.2 % — SIGNIFICANT CHANGE UP (ref 2–14)
MONOCYTES NFR BLD AUTO: 14.1 % — HIGH (ref 2–14)
MONOCYTES NFR BLD AUTO: 4.5 % — SIGNIFICANT CHANGE UP (ref 2–14)
MONOCYTES NFR BLD AUTO: 5.5 % — SIGNIFICANT CHANGE UP (ref 2–14)
MONOCYTES NFR BLD AUTO: 8.3 % — SIGNIFICANT CHANGE UP (ref 2–14)
NEUTROPHILS # BLD AUTO: 11.43 K/UL — HIGH (ref 1.8–7.4)
NEUTROPHILS # BLD AUTO: 12.83 K/UL — HIGH (ref 1.8–7.4)
NEUTROPHILS # BLD AUTO: 14.22 K/UL — HIGH (ref 1.8–7.4)
NEUTROPHILS # BLD AUTO: 6.23 K/UL — SIGNIFICANT CHANGE UP (ref 1.8–7.4)
NEUTROPHILS # BLD AUTO: 6.63 K/UL — SIGNIFICANT CHANGE UP (ref 1.8–7.4)
NEUTROPHILS # BLD AUTO: 9.26 K/UL — HIGH (ref 1.8–7.4)
NEUTROPHILS # BLD AUTO: 9.6 K/UL — HIGH (ref 1.8–7.4)
NEUTROPHILS NFR BLD AUTO: 66.4 % — SIGNIFICANT CHANGE UP (ref 43–77)
NEUTROPHILS NFR BLD AUTO: 70.6 % — SIGNIFICANT CHANGE UP (ref 43–77)
NEUTROPHILS NFR BLD AUTO: 73.2 % — SIGNIFICANT CHANGE UP (ref 43–77)
NEUTROPHILS NFR BLD AUTO: 73.5 % — SIGNIFICANT CHANGE UP (ref 43–77)
NEUTROPHILS NFR BLD AUTO: 83.6 % — HIGH (ref 43–77)
NEUTROPHILS NFR BLD AUTO: 84 % — HIGH (ref 43–77)
NEUTROPHILS NFR BLD AUTO: 88.3 % — HIGH (ref 43–77)
NITRITE UR-MCNC: NEGATIVE — SIGNIFICANT CHANGE UP
NON HDL CHOLESTEROL: 65 MG/DL — SIGNIFICANT CHANGE UP
NRBC # BLD: 0 /100 WBCS — SIGNIFICANT CHANGE UP (ref 0–0)
NRBC # FLD: 0 K/UL — SIGNIFICANT CHANGE UP (ref 0–0)
NRBC # FLD: 0.02 K/UL — HIGH (ref 0–0)
NT-PROBNP SERPL-SCNC: 3725 PG/ML — HIGH
OB PNL STL: POSITIVE
OSMOLALITY UR: 438 MOSM/KG — SIGNIFICANT CHANGE UP (ref 50–1200)
PCO2 BLDV: 51 MMHG — HIGH (ref 39–42)
PH BLDV: 7.42 — SIGNIFICANT CHANGE UP (ref 7.32–7.43)
PH UR: 6 — SIGNIFICANT CHANGE UP (ref 5–8)
PHOSPHATE SERPL-MCNC: 1.4 MG/DL — LOW (ref 2.5–4.5)
PHOSPHATE SERPL-MCNC: 1.7 MG/DL — LOW (ref 2.5–4.5)
PHOSPHATE SERPL-MCNC: 1.9 MG/DL — LOW (ref 2.5–4.5)
PHOSPHATE SERPL-MCNC: 2 MG/DL — LOW (ref 2.5–4.5)
PHOSPHATE SERPL-MCNC: 2 MG/DL — LOW (ref 2.5–4.5)
PHOSPHATE SERPL-MCNC: 2.1 MG/DL — LOW (ref 2.5–4.5)
PHOSPHATE SERPL-MCNC: 2.1 MG/DL — LOW (ref 2.5–4.5)
PHOSPHATE SERPL-MCNC: 2.3 MG/DL — LOW (ref 2.5–4.5)
PHOSPHATE SERPL-MCNC: 2.6 MG/DL — SIGNIFICANT CHANGE UP (ref 2.5–4.5)
PHOSPHATE SERPL-MCNC: 2.6 MG/DL — SIGNIFICANT CHANGE UP (ref 2.5–4.5)
PHOSPHATE SERPL-MCNC: 3.5 MG/DL — SIGNIFICANT CHANGE UP (ref 2.5–4.5)
PLATELET # BLD AUTO: 120 K/UL — LOW (ref 150–400)
PLATELET # BLD AUTO: 122 K/UL — LOW (ref 150–400)
PLATELET # BLD AUTO: 125 K/UL — LOW (ref 150–400)
PLATELET # BLD AUTO: 129 K/UL — LOW (ref 150–400)
PLATELET # BLD AUTO: 132 K/UL — LOW (ref 150–400)
PLATELET # BLD AUTO: 152 K/UL — SIGNIFICANT CHANGE UP (ref 150–400)
PLATELET # BLD AUTO: 153 K/UL — SIGNIFICANT CHANGE UP (ref 150–400)
PLATELET # BLD AUTO: 165 K/UL — SIGNIFICANT CHANGE UP (ref 150–400)
PLATELET # BLD AUTO: 170 K/UL — SIGNIFICANT CHANGE UP (ref 150–400)
PLATELET # BLD AUTO: 172 K/UL — SIGNIFICANT CHANGE UP (ref 150–400)
PLATELET # BLD AUTO: 178 K/UL — SIGNIFICANT CHANGE UP (ref 150–400)
PLATELET # BLD AUTO: 181 K/UL — SIGNIFICANT CHANGE UP (ref 150–400)
PLATELET # BLD AUTO: 181 K/UL — SIGNIFICANT CHANGE UP (ref 150–400)
PLATELET # BLD AUTO: 184 K/UL — SIGNIFICANT CHANGE UP (ref 150–400)
PLATELET # BLD AUTO: 185 K/UL — SIGNIFICANT CHANGE UP (ref 150–400)
PLATELET # BLD AUTO: 185 K/UL — SIGNIFICANT CHANGE UP (ref 150–400)
PLATELET # BLD AUTO: 191 K/UL — SIGNIFICANT CHANGE UP (ref 150–400)
PLATELET # BLD AUTO: 191 K/UL — SIGNIFICANT CHANGE UP (ref 150–400)
PLATELET # BLD AUTO: 193 K/UL — SIGNIFICANT CHANGE UP (ref 150–400)
PLATELET # BLD AUTO: 197 K/UL — SIGNIFICANT CHANGE UP (ref 150–400)
PLATELET # BLD AUTO: 216 K/UL — SIGNIFICANT CHANGE UP (ref 150–400)
PO2 BLDV: 32 MMHG — SIGNIFICANT CHANGE UP
POTASSIUM BLDV-SCNC: 3.9 MMOL/L — SIGNIFICANT CHANGE UP (ref 3.5–5.1)
POTASSIUM SERPL-MCNC: 2.3 MMOL/L — CRITICAL LOW (ref 3.5–5.3)
POTASSIUM SERPL-MCNC: 2.6 MMOL/L — CRITICAL LOW (ref 3.5–5.3)
POTASSIUM SERPL-MCNC: 3.3 MMOL/L — LOW (ref 3.5–5.3)
POTASSIUM SERPL-MCNC: 3.4 MMOL/L — LOW (ref 3.5–5.3)
POTASSIUM SERPL-MCNC: 3.6 MMOL/L — SIGNIFICANT CHANGE UP (ref 3.5–5.3)
POTASSIUM SERPL-MCNC: 3.6 MMOL/L — SIGNIFICANT CHANGE UP (ref 3.5–5.3)
POTASSIUM SERPL-MCNC: 3.7 MMOL/L — SIGNIFICANT CHANGE UP (ref 3.5–5.3)
POTASSIUM SERPL-MCNC: 3.8 MMOL/L — SIGNIFICANT CHANGE UP (ref 3.5–5.3)
POTASSIUM SERPL-MCNC: 3.8 MMOL/L — SIGNIFICANT CHANGE UP (ref 3.5–5.3)
POTASSIUM SERPL-MCNC: 3.9 MMOL/L — SIGNIFICANT CHANGE UP (ref 3.5–5.3)
POTASSIUM SERPL-MCNC: 4 MMOL/L — SIGNIFICANT CHANGE UP (ref 3.5–5.3)
POTASSIUM SERPL-MCNC: 4.1 MMOL/L — SIGNIFICANT CHANGE UP (ref 3.5–5.3)
POTASSIUM SERPL-MCNC: 4.1 MMOL/L — SIGNIFICANT CHANGE UP (ref 3.5–5.3)
POTASSIUM SERPL-MCNC: 4.7 MMOL/L — SIGNIFICANT CHANGE UP (ref 3.5–5.3)
POTASSIUM SERPL-MCNC: 4.8 MMOL/L — SIGNIFICANT CHANGE UP (ref 3.5–5.3)
POTASSIUM SERPL-MCNC: 5.1 MMOL/L — SIGNIFICANT CHANGE UP (ref 3.5–5.3)
POTASSIUM SERPL-MCNC: 5.4 MMOL/L — HIGH (ref 3.5–5.3)
POTASSIUM SERPL-SCNC: 2.3 MMOL/L — CRITICAL LOW (ref 3.5–5.3)
POTASSIUM SERPL-SCNC: 2.6 MMOL/L — CRITICAL LOW (ref 3.5–5.3)
POTASSIUM SERPL-SCNC: 3.3 MMOL/L — LOW (ref 3.5–5.3)
POTASSIUM SERPL-SCNC: 3.4 MMOL/L — LOW (ref 3.5–5.3)
POTASSIUM SERPL-SCNC: 3.6 MMOL/L — SIGNIFICANT CHANGE UP (ref 3.5–5.3)
POTASSIUM SERPL-SCNC: 3.6 MMOL/L — SIGNIFICANT CHANGE UP (ref 3.5–5.3)
POTASSIUM SERPL-SCNC: 3.7 MMOL/L — SIGNIFICANT CHANGE UP (ref 3.5–5.3)
POTASSIUM SERPL-SCNC: 3.8 MMOL/L — SIGNIFICANT CHANGE UP (ref 3.5–5.3)
POTASSIUM SERPL-SCNC: 3.8 MMOL/L — SIGNIFICANT CHANGE UP (ref 3.5–5.3)
POTASSIUM SERPL-SCNC: 3.9 MMOL/L — SIGNIFICANT CHANGE UP (ref 3.5–5.3)
POTASSIUM SERPL-SCNC: 4 MMOL/L — SIGNIFICANT CHANGE UP (ref 3.5–5.3)
POTASSIUM SERPL-SCNC: 4.1 MMOL/L — SIGNIFICANT CHANGE UP (ref 3.5–5.3)
POTASSIUM SERPL-SCNC: 4.1 MMOL/L — SIGNIFICANT CHANGE UP (ref 3.5–5.3)
POTASSIUM SERPL-SCNC: 4.7 MMOL/L — SIGNIFICANT CHANGE UP (ref 3.5–5.3)
POTASSIUM SERPL-SCNC: 4.8 MMOL/L — SIGNIFICANT CHANGE UP (ref 3.5–5.3)
POTASSIUM SERPL-SCNC: 5.1 MMOL/L — SIGNIFICANT CHANGE UP (ref 3.5–5.3)
POTASSIUM SERPL-SCNC: 5.4 MMOL/L — HIGH (ref 3.5–5.3)
POTASSIUM UR-SCNC: 37.1 MMOL/L — SIGNIFICANT CHANGE UP
PROLACTIN SERPL-MCNC: 8.8 NG/ML — SIGNIFICANT CHANGE UP (ref 3.4–24.1)
PROT ?TM UR-MCNC: 10 MG/DL — SIGNIFICANT CHANGE UP
PROT SERPL-MCNC: 5.9 G/DL — LOW (ref 6–8.3)
PROT SERPL-MCNC: 6 G/DL — SIGNIFICANT CHANGE UP (ref 6–8.3)
PROT SERPL-MCNC: 7.1 G/DL — SIGNIFICANT CHANGE UP (ref 6–8.3)
PROT SERPL-MCNC: 7.4 G/DL — SIGNIFICANT CHANGE UP (ref 6–8.3)
PROT SERPL-MCNC: 7.6 G/DL — SIGNIFICANT CHANGE UP (ref 6–8.3)
PROT SERPL-MCNC: 7.6 G/DL — SIGNIFICANT CHANGE UP (ref 6–8.3)
PROT SERPL-MCNC: 7.7 G/DL — SIGNIFICANT CHANGE UP (ref 6–8.3)
PROT UR-MCNC: ABNORMAL
PROT/CREAT UR-RTO: 0.2 RATIO — SIGNIFICANT CHANGE UP (ref 0–0.2)
PROTHROM AB SERPL-ACNC: 25.9 SEC — HIGH (ref 10.5–13.4)
PROTHROM AB SERPL-ACNC: 30.8 SEC — HIGH (ref 10.5–13.4)
PROTHROM AB SERPL-ACNC: 38.8 SEC — HIGH (ref 10.5–13.4)
RAPID RVP RESULT: DETECTED
RAPID RVP RESULT: DETECTED
RBC # BLD: 3.21 M/UL — LOW (ref 3.8–5.2)
RBC # BLD: 3.23 M/UL — LOW (ref 3.8–5.2)
RBC # BLD: 3.23 M/UL — LOW (ref 3.8–5.2)
RBC # BLD: 3.26 M/UL — LOW (ref 3.8–5.2)
RBC # BLD: 3.27 M/UL — LOW (ref 3.8–5.2)
RBC # BLD: 3.27 M/UL — LOW (ref 3.8–5.2)
RBC # BLD: 3.28 M/UL — LOW (ref 3.8–5.2)
RBC # BLD: 3.29 M/UL — LOW (ref 3.8–5.2)
RBC # BLD: 3.35 M/UL — LOW (ref 3.8–5.2)
RBC # BLD: 3.35 M/UL — LOW (ref 3.8–5.2)
RBC # BLD: 3.48 M/UL — LOW (ref 3.8–5.2)
RBC # BLD: 3.49 M/UL — LOW (ref 3.8–5.2)
RBC # BLD: 3.5 M/UL — LOW (ref 3.8–5.2)
RBC # BLD: 3.51 M/UL — LOW (ref 3.8–5.2)
RBC # BLD: 3.53 M/UL — LOW (ref 3.8–5.2)
RBC # BLD: 3.57 M/UL — LOW (ref 3.8–5.2)
RBC # BLD: 3.89 M/UL — SIGNIFICANT CHANGE UP (ref 3.8–5.2)
RBC # BLD: 3.89 M/UL — SIGNIFICANT CHANGE UP (ref 3.8–5.2)
RBC # BLD: 3.91 M/UL — SIGNIFICANT CHANGE UP (ref 3.8–5.2)
RBC # BLD: 3.98 M/UL — SIGNIFICANT CHANGE UP (ref 3.8–5.2)
RBC # BLD: 4.03 M/UL — SIGNIFICANT CHANGE UP (ref 3.8–5.2)
RBC # FLD: 15.1 % — HIGH (ref 10.3–14.5)
RBC # FLD: 15.4 % — HIGH (ref 10.3–14.5)
RBC # FLD: 15.5 % — HIGH (ref 10.3–14.5)
RBC # FLD: 15.7 % — HIGH (ref 10.3–14.5)
RBC # FLD: 15.8 % — HIGH (ref 10.3–14.5)
RBC # FLD: 15.9 % — HIGH (ref 10.3–14.5)
RBC # FLD: 16.4 % — HIGH (ref 10.3–14.5)
RBC # FLD: 16.5 % — HIGH (ref 10.3–14.5)
RBC # FLD: 16.8 % — HIGH (ref 10.3–14.5)
RBC # FLD: 16.9 % — HIGH (ref 10.3–14.5)
RBC # FLD: 17.1 % — HIGH (ref 10.3–14.5)
RBC # FLD: 17.1 % — HIGH (ref 10.3–14.5)
RH IG SCN BLD-IMP: NEGATIVE — SIGNIFICANT CHANGE UP
RSV RNA NPH QL NAA+NON-PROBE: SIGNIFICANT CHANGE UP
RSV RNA NPH QL NAA+NON-PROBE: SIGNIFICANT CHANGE UP
RSV RNA SPEC QL NAA+PROBE: SIGNIFICANT CHANGE UP
RSV RNA SPEC QL NAA+PROBE: SIGNIFICANT CHANGE UP
RV+EV RNA SPEC QL NAA+PROBE: SIGNIFICANT CHANGE UP
RV+EV RNA SPEC QL NAA+PROBE: SIGNIFICANT CHANGE UP
SAO2 % BLDV: 51.8 % — SIGNIFICANT CHANGE UP
SARS-COV-2 RNA SPEC QL NAA+PROBE: SIGNIFICANT CHANGE UP
SODIUM SERPL-SCNC: 130 MMOL/L — LOW (ref 135–145)
SODIUM SERPL-SCNC: 134 MMOL/L — LOW (ref 135–145)
SODIUM SERPL-SCNC: 135 MMOL/L — SIGNIFICANT CHANGE UP (ref 135–145)
SODIUM SERPL-SCNC: 136 MMOL/L — SIGNIFICANT CHANGE UP (ref 135–145)
SODIUM SERPL-SCNC: 137 MMOL/L — SIGNIFICANT CHANGE UP (ref 135–145)
SODIUM SERPL-SCNC: 139 MMOL/L — SIGNIFICANT CHANGE UP (ref 135–145)
SODIUM SERPL-SCNC: 139 MMOL/L — SIGNIFICANT CHANGE UP (ref 135–145)
SODIUM SERPL-SCNC: 140 MMOL/L — SIGNIFICANT CHANGE UP (ref 135–145)
SODIUM SERPL-SCNC: 141 MMOL/L — SIGNIFICANT CHANGE UP (ref 135–145)
SODIUM SERPL-SCNC: 142 MMOL/L — SIGNIFICANT CHANGE UP (ref 135–145)
SODIUM SERPL-SCNC: 143 MMOL/L — SIGNIFICANT CHANGE UP (ref 135–145)
SODIUM SERPL-SCNC: 144 MMOL/L — SIGNIFICANT CHANGE UP (ref 135–145)
SODIUM SERPL-SCNC: 145 MMOL/L — SIGNIFICANT CHANGE UP (ref 135–145)
SODIUM SERPL-SCNC: 148 MMOL/L — HIGH (ref 135–145)
SODIUM SERPL-SCNC: 149 MMOL/L — HIGH (ref 135–145)
SODIUM SERPL-SCNC: 151 MMOL/L — HIGH (ref 135–145)
SODIUM UR-SCNC: <20 MMOL/L — SIGNIFICANT CHANGE UP
SP GR SPEC: >1.05 (ref 1.01–1.05)
SPECIMEN SOURCE: SIGNIFICANT CHANGE UP
SURGICAL PATHOLOGY STUDY: SIGNIFICANT CHANGE UP
T3 SERPL-MCNC: 83 NG/DL — SIGNIFICANT CHANGE UP (ref 80–200)
T4 AB SER-ACNC: 4.28 UG/DL — LOW (ref 5.1–13)
T4 FREE SERPL-MCNC: 0.8 NG/DL — LOW (ref 0.9–1.8)
T4 FREE SERPL-MCNC: 1 NG/DL — SIGNIFICANT CHANGE UP (ref 0.9–1.8)
TIBC SERPL-MCNC: 396 UG/DL — SIGNIFICANT CHANGE UP (ref 220–430)
TRIGL SERPL-MCNC: 64 MG/DL — SIGNIFICANT CHANGE UP
TROPONIN T, HIGH SENSITIVITY RESULT: 12 NG/L — SIGNIFICANT CHANGE UP
TROPONIN T, HIGH SENSITIVITY RESULT: 14 NG/L — SIGNIFICANT CHANGE UP
TROPONIN T, HIGH SENSITIVITY RESULT: 14 NG/L — SIGNIFICANT CHANGE UP
TROPONIN T, HIGH SENSITIVITY RESULT: 15 NG/L — SIGNIFICANT CHANGE UP
TROPONIN T, HIGH SENSITIVITY RESULT: 17 NG/L — SIGNIFICANT CHANGE UP
TROPONIN T, HIGH SENSITIVITY RESULT: 18 NG/L — SIGNIFICANT CHANGE UP
TSH SERPL-MCNC: 1.41 UIU/ML — SIGNIFICANT CHANGE UP (ref 0.27–4.2)
TSH SERPL-MCNC: 6.17 UIU/ML — HIGH (ref 0.27–4.2)
TSH SERPL-MCNC: 7.74 UIU/ML — HIGH (ref 0.27–4.2)
UIBC SERPL-MCNC: 362 UG/DL — SIGNIFICANT CHANGE UP (ref 110–370)
UROBILINOGEN FLD QL: SIGNIFICANT CHANGE UP
UUN UR-MCNC: 428 MG/DL — SIGNIFICANT CHANGE UP
VIT B12 SERPL-MCNC: 1234 PG/ML — HIGH (ref 200–900)
WBC # BLD: 10.17 K/UL — SIGNIFICANT CHANGE UP (ref 3.8–10.5)
WBC # BLD: 10.21 K/UL — SIGNIFICANT CHANGE UP (ref 3.8–10.5)
WBC # BLD: 10.33 K/UL — SIGNIFICANT CHANGE UP (ref 3.8–10.5)
WBC # BLD: 11 K/UL — HIGH (ref 3.8–10.5)
WBC # BLD: 11.35 K/UL — HIGH (ref 3.8–10.5)
WBC # BLD: 11.43 K/UL — HIGH (ref 3.8–10.5)
WBC # BLD: 11.61 K/UL — HIGH (ref 3.8–10.5)
WBC # BLD: 12.69 K/UL — HIGH (ref 3.8–10.5)
WBC # BLD: 13.12 K/UL — HIGH (ref 3.8–10.5)
WBC # BLD: 14.53 K/UL — HIGH (ref 3.8–10.5)
WBC # BLD: 15.04 K/UL — HIGH (ref 3.8–10.5)
WBC # BLD: 15.61 K/UL — HIGH (ref 3.8–10.5)
WBC # BLD: 16.51 K/UL — HIGH (ref 3.8–10.5)
WBC # BLD: 17.01 K/UL — HIGH (ref 3.8–10.5)
WBC # BLD: 8.47 K/UL — SIGNIFICANT CHANGE UP (ref 3.8–10.5)
WBC # BLD: 8.57 K/UL — SIGNIFICANT CHANGE UP (ref 3.8–10.5)
WBC # BLD: 8.84 K/UL — SIGNIFICANT CHANGE UP (ref 3.8–10.5)
WBC # BLD: 9.59 K/UL — SIGNIFICANT CHANGE UP (ref 3.8–10.5)
WBC # BLD: 9.81 K/UL — SIGNIFICANT CHANGE UP (ref 3.8–10.5)
WBC # BLD: 9.93 K/UL — SIGNIFICANT CHANGE UP (ref 3.8–10.5)
WBC # BLD: 9.99 K/UL — SIGNIFICANT CHANGE UP (ref 3.8–10.5)
WBC # FLD AUTO: 10.17 K/UL — SIGNIFICANT CHANGE UP (ref 3.8–10.5)
WBC # FLD AUTO: 10.21 K/UL — SIGNIFICANT CHANGE UP (ref 3.8–10.5)
WBC # FLD AUTO: 10.33 K/UL — SIGNIFICANT CHANGE UP (ref 3.8–10.5)
WBC # FLD AUTO: 11 K/UL — HIGH (ref 3.8–10.5)
WBC # FLD AUTO: 11.35 K/UL — HIGH (ref 3.8–10.5)
WBC # FLD AUTO: 11.43 K/UL — HIGH (ref 3.8–10.5)
WBC # FLD AUTO: 11.61 K/UL — HIGH (ref 3.8–10.5)
WBC # FLD AUTO: 12.69 K/UL — HIGH (ref 3.8–10.5)
WBC # FLD AUTO: 13.12 K/UL — HIGH (ref 3.8–10.5)
WBC # FLD AUTO: 14.53 K/UL — HIGH (ref 3.8–10.5)
WBC # FLD AUTO: 15.04 K/UL — HIGH (ref 3.8–10.5)
WBC # FLD AUTO: 15.61 K/UL — HIGH (ref 3.8–10.5)
WBC # FLD AUTO: 16.51 K/UL — HIGH (ref 3.8–10.5)
WBC # FLD AUTO: 17.01 K/UL — HIGH (ref 3.8–10.5)
WBC # FLD AUTO: 8.47 K/UL — SIGNIFICANT CHANGE UP (ref 3.8–10.5)
WBC # FLD AUTO: 8.57 K/UL — SIGNIFICANT CHANGE UP (ref 3.8–10.5)
WBC # FLD AUTO: 8.84 K/UL — SIGNIFICANT CHANGE UP (ref 3.8–10.5)
WBC # FLD AUTO: 9.59 K/UL — SIGNIFICANT CHANGE UP (ref 3.8–10.5)
WBC # FLD AUTO: 9.81 K/UL — SIGNIFICANT CHANGE UP (ref 3.8–10.5)
WBC # FLD AUTO: 9.93 K/UL — SIGNIFICANT CHANGE UP (ref 3.8–10.5)
WBC # FLD AUTO: 9.99 K/UL — SIGNIFICANT CHANGE UP (ref 3.8–10.5)

## 2022-01-01 PROCEDURE — 74177 CT ABD & PELVIS W/CONTRAST: CPT | Mod: 26,MA

## 2022-01-01 PROCEDURE — 99231 SBSQ HOSP IP/OBS SF/LOW 25: CPT

## 2022-01-01 PROCEDURE — 93010 ELECTROCARDIOGRAM REPORT: CPT

## 2022-01-01 PROCEDURE — 99214 OFFICE O/P EST MOD 30 MIN: CPT

## 2022-01-01 PROCEDURE — 93306 TTE W/DOPPLER COMPLETE: CPT | Mod: 26

## 2022-01-01 PROCEDURE — 99285 EMERGENCY DEPT VISIT HI MDM: CPT

## 2022-01-01 PROCEDURE — 71045 X-RAY EXAM CHEST 1 VIEW: CPT | Mod: 26

## 2022-01-01 PROCEDURE — 99233 SBSQ HOSP IP/OBS HIGH 50: CPT | Mod: GC

## 2022-01-01 PROCEDURE — 75635 CT ANGIO ABDOMINAL ARTERIES: CPT | Mod: 26

## 2022-01-01 PROCEDURE — 88305 TISSUE EXAM BY PATHOLOGIST: CPT | Mod: 26

## 2022-01-01 PROCEDURE — 99232 SBSQ HOSP IP/OBS MODERATE 35: CPT

## 2022-01-01 PROCEDURE — 12345: CPT | Mod: NC

## 2022-01-01 PROCEDURE — 99233 SBSQ HOSP IP/OBS HIGH 50: CPT

## 2022-01-01 PROCEDURE — 99223 1ST HOSP IP/OBS HIGH 75: CPT

## 2022-01-01 PROCEDURE — 99222 1ST HOSP IP/OBS MODERATE 55: CPT

## 2022-01-01 PROCEDURE — 88342 IMHCHEM/IMCYTCHM 1ST ANTB: CPT | Mod: 26

## 2022-01-01 PROCEDURE — 99221 1ST HOSP IP/OBS SF/LOW 40: CPT

## 2022-01-01 PROCEDURE — 99222 1ST HOSP IP/OBS MODERATE 55: CPT | Mod: GC

## 2022-01-01 PROCEDURE — 99232 SBSQ HOSP IP/OBS MODERATE 35: CPT | Mod: GC

## 2022-01-01 PROCEDURE — 99497 ADVNCD CARE PLAN 30 MIN: CPT | Mod: 25

## 2022-01-01 PROCEDURE — 45380 COLONOSCOPY AND BIOPSY: CPT | Mod: GC

## 2022-01-01 PROCEDURE — 99291 CRITICAL CARE FIRST HOUR: CPT

## 2022-01-01 PROCEDURE — 99204 OFFICE O/P NEW MOD 45 MIN: CPT

## 2022-01-01 PROCEDURE — 99239 HOSP IP/OBS DSCHRG MGMT >30: CPT

## 2022-01-01 PROCEDURE — 93970 EXTREMITY STUDY: CPT | Mod: 26

## 2022-01-01 PROCEDURE — 93971 EXTREMITY STUDY: CPT | Mod: 26,LT

## 2022-01-01 PROCEDURE — 12345: CPT | Mod: NC,GC

## 2022-01-01 RX ORDER — IPRATROPIUM/ALBUTEROL SULFATE 18-103MCG
3 AEROSOL WITH ADAPTER (GRAM) INHALATION ONCE
Refills: 0 | Status: DISCONTINUED | OUTPATIENT
Start: 2022-01-01 | End: 2022-01-01

## 2022-01-01 RX ORDER — SOTALOL HCL 120 MG
120 TABLET ORAL EVERY 24 HOURS
Refills: 0 | Status: DISCONTINUED | OUTPATIENT
Start: 2022-01-01 | End: 2022-01-01

## 2022-01-01 RX ORDER — RIVAROXABAN 15 MG-20MG
15 KIT ORAL
Refills: 0 | Status: DISCONTINUED | OUTPATIENT
Start: 2022-01-01 | End: 2022-01-01

## 2022-01-01 RX ORDER — POLYETHYLENE GLYCOL 3350 17 G/17G
17 POWDER, FOR SOLUTION ORAL
Qty: 0 | Refills: 0 | DISCHARGE
Start: 2022-01-01

## 2022-01-01 RX ORDER — SOD SULF/SODIUM/NAHCO3/KCL/PEG
4000 SOLUTION, RECONSTITUTED, ORAL ORAL ONCE
Refills: 0 | Status: COMPLETED | OUTPATIENT
Start: 2022-01-01 | End: 2022-01-01

## 2022-01-01 RX ORDER — MAGNESIUM SULFATE 500 MG/ML
2 VIAL (ML) INJECTION ONCE
Refills: 0 | Status: COMPLETED | OUTPATIENT
Start: 2022-01-01 | End: 2022-01-01

## 2022-01-01 RX ORDER — INFLUENZA VIRUS VACCINE 15; 15; 15; 15 UG/.5ML; UG/.5ML; UG/.5ML; UG/.5ML
0.7 SUSPENSION INTRAMUSCULAR ONCE
Refills: 0 | Status: DISCONTINUED | OUTPATIENT
Start: 2022-01-01 | End: 2022-01-01

## 2022-01-01 RX ORDER — POTASSIUM CHLORIDE 20 MEQ
40 PACKET (EA) ORAL EVERY 4 HOURS
Refills: 0 | Status: COMPLETED | OUTPATIENT
Start: 2022-01-01 | End: 2022-01-01

## 2022-01-01 RX ORDER — POTASSIUM PHOSPHATE, MONOBASIC POTASSIUM PHOSPHATE, DIBASIC 236; 224 MG/ML; MG/ML
15 INJECTION, SOLUTION INTRAVENOUS ONCE
Refills: 0 | Status: COMPLETED | OUTPATIENT
Start: 2022-01-01 | End: 2022-01-01

## 2022-01-01 RX ORDER — SODIUM CHLORIDE 9 MG/ML
1000 INJECTION, SOLUTION INTRAVENOUS
Refills: 0 | Status: DISCONTINUED | OUTPATIENT
Start: 2022-01-01 | End: 2022-01-01

## 2022-01-01 RX ORDER — ASCORBIC ACID 60 MG
1 TABLET,CHEWABLE ORAL
Qty: 0 | Refills: 0 | DISCHARGE
Start: 2022-01-01

## 2022-01-01 RX ORDER — ACETAMINOPHEN 500 MG
650 TABLET ORAL EVERY 6 HOURS
Refills: 0 | Status: DISCONTINUED | OUTPATIENT
Start: 2022-01-01 | End: 2022-01-01

## 2022-01-01 RX ORDER — CEFAZOLIN SODIUM 1 G
1000 VIAL (EA) INJECTION EVERY 8 HOURS
Refills: 0 | Status: DISCONTINUED | OUTPATIENT
Start: 2022-01-01 | End: 2022-01-01

## 2022-01-01 RX ORDER — ASPIRIN/CALCIUM CARB/MAGNESIUM 324 MG
81 TABLET ORAL DAILY
Refills: 0 | Status: DISCONTINUED | OUTPATIENT
Start: 2022-01-01 | End: 2022-01-01

## 2022-01-01 RX ORDER — ACETAMINOPHEN 500 MG
975 TABLET ORAL ONCE
Refills: 0 | Status: COMPLETED | OUTPATIENT
Start: 2022-01-01 | End: 2022-01-01

## 2022-01-01 RX ORDER — POTASSIUM PHOSPHATE, MONOBASIC POTASSIUM PHOSPHATE, DIBASIC 236; 224 MG/ML; MG/ML
15 INJECTION, SOLUTION INTRAVENOUS ONCE
Refills: 0 | Status: DISCONTINUED | OUTPATIENT
Start: 2022-01-01 | End: 2022-01-01

## 2022-01-01 RX ORDER — AMPICILLIN SODIUM AND SULBACTAM SODIUM 250; 125 MG/ML; MG/ML
3 INJECTION, POWDER, FOR SUSPENSION INTRAMUSCULAR; INTRAVENOUS ONCE
Refills: 0 | Status: COMPLETED | OUTPATIENT
Start: 2022-01-01 | End: 2022-01-01

## 2022-01-01 RX ORDER — SODIUM CHLORIDE 9 MG/ML
1000 INJECTION INTRAMUSCULAR; INTRAVENOUS; SUBCUTANEOUS
Refills: 0 | Status: DISCONTINUED | OUTPATIENT
Start: 2022-01-01 | End: 2022-01-01

## 2022-01-01 RX ORDER — ASCORBIC ACID 60 MG
500 TABLET,CHEWABLE ORAL
Refills: 0 | Status: DISCONTINUED | OUTPATIENT
Start: 2022-01-01 | End: 2022-01-01

## 2022-01-01 RX ORDER — ASCORBIC ACID 60 MG
500 TABLET,CHEWABLE ORAL DAILY
Refills: 0 | Status: DISCONTINUED | OUTPATIENT
Start: 2022-01-01 | End: 2023-01-01

## 2022-01-01 RX ORDER — POLYETHYLENE GLYCOL 3350 17 G/17G
17 POWDER, FOR SOLUTION ORAL
Refills: 0 | Status: DISCONTINUED | OUTPATIENT
Start: 2022-01-01 | End: 2022-01-01

## 2022-01-01 RX ORDER — POTASSIUM CHLORIDE 20 MEQ
10 PACKET (EA) ORAL
Refills: 0 | Status: COMPLETED | OUTPATIENT
Start: 2022-01-01 | End: 2022-01-01

## 2022-01-01 RX ORDER — POLYETHYLENE GLYCOL 3350 17 G/17G
17 POWDER, FOR SOLUTION ORAL DAILY
Refills: 0 | Status: DISCONTINUED | OUTPATIENT
Start: 2022-01-01 | End: 2022-01-01

## 2022-01-01 RX ORDER — SENNA PLUS 8.6 MG/1
2 TABLET ORAL AT BEDTIME
Refills: 0 | Status: DISCONTINUED | OUTPATIENT
Start: 2022-01-01 | End: 2023-01-01

## 2022-01-01 RX ORDER — PHENYLEPHRINE-SHARK LIVER OIL-MINERAL OIL-PETROLATUM RECTAL OINTMENT
1 OINTMENT (GRAM) RECTAL DAILY
Refills: 0 | Status: DISCONTINUED | OUTPATIENT
Start: 2022-01-01 | End: 2022-01-01

## 2022-01-01 RX ORDER — MAGNESIUM OXIDE 400 MG ORAL TABLET 241.3 MG
400 TABLET ORAL
Refills: 0 | Status: DISCONTINUED | OUTPATIENT
Start: 2022-01-01 | End: 2022-01-01

## 2022-01-01 RX ORDER — FUROSEMIDE 40 MG
20 TABLET ORAL DAILY
Refills: 0 | Status: DISCONTINUED | OUTPATIENT
Start: 2022-01-01 | End: 2022-01-01

## 2022-01-01 RX ORDER — RIVAROXABAN 15 MG-20MG
1 KIT ORAL
Qty: 0 | Refills: 0 | DISCHARGE

## 2022-01-01 RX ORDER — PIPERACILLIN AND TAZOBACTAM 4; .5 G/20ML; G/20ML
3.38 INJECTION, POWDER, LYOPHILIZED, FOR SOLUTION INTRAVENOUS ONCE
Refills: 0 | Status: COMPLETED | OUTPATIENT
Start: 2022-01-01 | End: 2022-01-01

## 2022-01-01 RX ORDER — ASPIRIN/CALCIUM CARB/MAGNESIUM 324 MG
1 TABLET ORAL
Qty: 0 | Refills: 0 | DISCHARGE

## 2022-01-01 RX ORDER — RIVAROXABAN 15 MG-20MG
15 KIT ORAL DAILY
Refills: 0 | Status: DISCONTINUED | OUTPATIENT
Start: 2022-01-01 | End: 2022-01-01

## 2022-01-01 RX ORDER — IPRATROPIUM/ALBUTEROL SULFATE 18-103MCG
3 AEROSOL WITH ADAPTER (GRAM) INHALATION EVERY 6 HOURS
Refills: 0 | Status: DISCONTINUED | OUTPATIENT
Start: 2022-01-01 | End: 2023-01-01

## 2022-01-01 RX ORDER — COLLAGENASE CLOSTRIDIUM HIST. 250 UNIT/G
1 OINTMENT (GRAM) TOPICAL DAILY
Refills: 0 | Status: DISCONTINUED | OUTPATIENT
Start: 2022-01-01 | End: 2023-01-01

## 2022-01-01 RX ORDER — RIVAROXABAN 15 MG-20MG
15 KIT ORAL
Refills: 0 | Status: DISCONTINUED | OUTPATIENT
Start: 2022-01-01 | End: 2023-01-01

## 2022-01-01 RX ORDER — ATORVASTATIN CALCIUM 80 MG/1
10 TABLET, FILM COATED ORAL AT BEDTIME
Refills: 0 | Status: DISCONTINUED | OUTPATIENT
Start: 2022-01-01 | End: 2022-01-01

## 2022-01-01 RX ORDER — CEFAZOLIN SODIUM 1 G
1000 VIAL (EA) INJECTION ONCE
Refills: 0 | Status: COMPLETED | OUTPATIENT
Start: 2022-01-01 | End: 2022-01-01

## 2022-01-01 RX ORDER — SODIUM,POTASSIUM PHOSPHATES 278-250MG
2 POWDER IN PACKET (EA) ORAL
Refills: 0 | Status: COMPLETED | OUTPATIENT
Start: 2022-01-01 | End: 2022-01-01

## 2022-01-01 RX ORDER — SOTALOL HCL 120 MG
120 TABLET ORAL
Refills: 0 | Status: DISCONTINUED | OUTPATIENT
Start: 2022-01-01 | End: 2022-01-01

## 2022-01-01 RX ORDER — ACETAMINOPHEN 500 MG
2 TABLET ORAL
Qty: 0 | Refills: 0 | DISCHARGE
Start: 2022-01-01

## 2022-01-01 RX ORDER — DIBUCAINE 1 %
1 OINTMENT (GRAM) RECTAL DAILY
Refills: 0 | Status: DISCONTINUED | OUTPATIENT
Start: 2022-01-01 | End: 2022-01-01

## 2022-01-01 RX ORDER — POTASSIUM CHLORIDE 20 MEQ
40 PACKET (EA) ORAL ONCE
Refills: 0 | Status: COMPLETED | OUTPATIENT
Start: 2022-01-01 | End: 2022-01-01

## 2022-01-01 RX ORDER — SODIUM,POTASSIUM PHOSPHATES 278-250MG
2 POWDER IN PACKET (EA) ORAL
Refills: 0 | Status: COMPLETED | OUTPATIENT
Start: 2022-01-01 | End: 2023-01-01

## 2022-01-01 RX ORDER — CEFAZOLIN SODIUM 1 G
VIAL (EA) INJECTION
Refills: 0 | Status: DISCONTINUED | OUTPATIENT
Start: 2022-01-01 | End: 2022-01-01

## 2022-01-01 RX ORDER — ASPIRIN/CALCIUM CARB/MAGNESIUM 324 MG
81 TABLET ORAL DAILY
Refills: 0 | Status: DISCONTINUED | OUTPATIENT
Start: 2022-01-01 | End: 2023-01-01

## 2022-01-01 RX ORDER — SODIUM CHLORIDE 9 MG/ML
1000 INJECTION INTRAMUSCULAR; INTRAVENOUS; SUBCUTANEOUS ONCE
Refills: 0 | Status: COMPLETED | OUTPATIENT
Start: 2022-01-01 | End: 2022-01-01

## 2022-01-01 RX ORDER — HYDROCORTISONE 1 %
1 OINTMENT (GRAM) TOPICAL DAILY
Refills: 0 | Status: DISCONTINUED | OUTPATIENT
Start: 2022-01-01 | End: 2022-01-01

## 2022-01-01 RX ORDER — PIPERACILLIN AND TAZOBACTAM 4; .5 G/20ML; G/20ML
3.38 INJECTION, POWDER, LYOPHILIZED, FOR SOLUTION INTRAVENOUS EVERY 8 HOURS
Refills: 0 | Status: DISCONTINUED | OUTPATIENT
Start: 2022-01-01 | End: 2022-01-01

## 2022-01-01 RX ORDER — POLYETHYLENE GLYCOL 3350 17 G/17G
17 POWDER, FOR SOLUTION ORAL
Refills: 0 | Status: DISCONTINUED | OUTPATIENT
Start: 2022-01-01 | End: 2023-01-01

## 2022-01-01 RX ORDER — RIVAROXABAN 15 MG-20MG
1 KIT ORAL
Qty: 0 | Refills: 0 | DISCHARGE
Start: 2022-01-01

## 2022-01-01 RX ORDER — POTASSIUM CHLORIDE 20 MEQ
40 PACKET (EA) ORAL EVERY 4 HOURS
Refills: 0 | Status: DISCONTINUED | OUTPATIENT
Start: 2022-01-01 | End: 2022-01-01

## 2022-01-01 RX ORDER — DIBUCAINE 1 %
1 OINTMENT (GRAM) RECTAL
Qty: 0 | Refills: 0 | DISCHARGE
Start: 2022-01-01

## 2022-01-01 RX ORDER — POTASSIUM PHOSPHATE, MONOBASIC POTASSIUM PHOSPHATE, DIBASIC 236; 224 MG/ML; MG/ML
30 INJECTION, SOLUTION INTRAVENOUS ONCE
Refills: 0 | Status: COMPLETED | OUTPATIENT
Start: 2022-01-01 | End: 2022-01-01

## 2022-01-01 RX ORDER — PIPERACILLIN AND TAZOBACTAM 4; .5 G/20ML; G/20ML
3.38 INJECTION, POWDER, LYOPHILIZED, FOR SOLUTION INTRAVENOUS ONCE
Refills: 0 | Status: DISCONTINUED | OUTPATIENT
Start: 2022-01-01 | End: 2022-01-01

## 2022-01-01 RX ORDER — RIVAROXABAN 15 MG-20MG
20 KIT ORAL
Refills: 0 | Status: DISCONTINUED | OUTPATIENT
Start: 2022-01-01 | End: 2022-01-01

## 2022-01-01 RX ADMIN — Medication 30 MILLIGRAM(S): at 04:56

## 2022-01-01 RX ADMIN — RIVAROXABAN 15 MILLIGRAM(S): KIT at 18:23

## 2022-01-01 RX ADMIN — PIPERACILLIN AND TAZOBACTAM 25 GRAM(S): 4; .5 INJECTION, POWDER, LYOPHILIZED, FOR SOLUTION INTRAVENOUS at 14:09

## 2022-01-01 RX ADMIN — PIPERACILLIN AND TAZOBACTAM 25 GRAM(S): 4; .5 INJECTION, POWDER, LYOPHILIZED, FOR SOLUTION INTRAVENOUS at 06:54

## 2022-01-01 RX ADMIN — PIPERACILLIN AND TAZOBACTAM 25 GRAM(S): 4; .5 INJECTION, POWDER, LYOPHILIZED, FOR SOLUTION INTRAVENOUS at 22:19

## 2022-01-01 RX ADMIN — Medication 4000 MILLILITER(S): at 12:06

## 2022-01-01 RX ADMIN — POLYETHYLENE GLYCOL 3350 17 GRAM(S): 17 POWDER, FOR SOLUTION ORAL at 17:11

## 2022-01-01 RX ADMIN — Medication 100 MILLIGRAM(S): at 15:28

## 2022-01-01 RX ADMIN — POLYETHYLENE GLYCOL 3350 17 GRAM(S): 17 POWDER, FOR SOLUTION ORAL at 04:55

## 2022-01-01 RX ADMIN — POLYETHYLENE GLYCOL 3350 17 GRAM(S): 17 POWDER, FOR SOLUTION ORAL at 05:14

## 2022-01-01 RX ADMIN — Medication 1 APPLICATION(S): at 08:59

## 2022-01-01 RX ADMIN — ATORVASTATIN CALCIUM 10 MILLIGRAM(S): 80 TABLET, FILM COATED ORAL at 21:27

## 2022-01-01 RX ADMIN — Medication 500 MILLIGRAM(S): at 05:20

## 2022-01-01 RX ADMIN — Medication 100 MILLIGRAM(S): at 05:33

## 2022-01-01 RX ADMIN — Medication 1 DROP(S): at 18:24

## 2022-01-01 RX ADMIN — SODIUM CHLORIDE 1000 MILLILITER(S): 9 INJECTION INTRAMUSCULAR; INTRAVENOUS; SUBCUTANEOUS at 20:00

## 2022-01-01 RX ADMIN — SENNA PLUS 2 TABLET(S): 8.6 TABLET ORAL at 21:15

## 2022-01-01 RX ADMIN — MAGNESIUM OXIDE 400 MG ORAL TABLET 400 MILLIGRAM(S): 241.3 TABLET ORAL at 12:46

## 2022-01-01 RX ADMIN — Medication 1 DROP(S): at 19:34

## 2022-01-01 RX ADMIN — Medication 20 MILLIGRAM(S): at 05:13

## 2022-01-01 RX ADMIN — Medication 100 MILLIEQUIVALENT(S): at 09:38

## 2022-01-01 RX ADMIN — Medication 100 MILLIEQUIVALENT(S): at 12:01

## 2022-01-01 RX ADMIN — Medication 2 PACKET(S): at 10:11

## 2022-01-01 RX ADMIN — SODIUM CHLORIDE 75 MILLILITER(S): 9 INJECTION, SOLUTION INTRAVENOUS at 17:21

## 2022-01-01 RX ADMIN — RIVAROXABAN 15 MILLIGRAM(S): KIT at 17:53

## 2022-01-01 RX ADMIN — Medication 100 MILLIGRAM(S): at 21:23

## 2022-01-01 RX ADMIN — Medication 100 MILLIGRAM(S): at 13:29

## 2022-01-01 RX ADMIN — ATORVASTATIN CALCIUM 10 MILLIGRAM(S): 80 TABLET, FILM COATED ORAL at 23:27

## 2022-01-01 RX ADMIN — PIPERACILLIN AND TAZOBACTAM 25 GRAM(S): 4; .5 INJECTION, POWDER, LYOPHILIZED, FOR SOLUTION INTRAVENOUS at 05:14

## 2022-01-01 RX ADMIN — RIVAROXABAN 15 MILLIGRAM(S): KIT at 12:14

## 2022-01-01 RX ADMIN — Medication 500 MILLIGRAM(S): at 05:18

## 2022-01-01 RX ADMIN — SENNA PLUS 2 TABLET(S): 8.6 TABLET ORAL at 21:24

## 2022-01-01 RX ADMIN — RIVAROXABAN 15 MILLIGRAM(S): KIT at 12:46

## 2022-01-01 RX ADMIN — Medication 40 MILLIEQUIVALENT(S): at 09:35

## 2022-01-01 RX ADMIN — Medication 3 MILLILITER(S): at 11:29

## 2022-01-01 RX ADMIN — ATORVASTATIN CALCIUM 10 MILLIGRAM(S): 80 TABLET, FILM COATED ORAL at 22:42

## 2022-01-01 RX ADMIN — Medication 100 MILLIEQUIVALENT(S): at 08:18

## 2022-01-01 RX ADMIN — Medication 40 MILLIEQUIVALENT(S): at 23:44

## 2022-01-01 RX ADMIN — Medication 1 TABLET(S): at 12:40

## 2022-01-01 RX ADMIN — Medication 1 APPLICATION(S): at 21:20

## 2022-01-01 RX ADMIN — Medication 1 TABLET(S): at 13:13

## 2022-01-01 RX ADMIN — Medication 500 MILLIGRAM(S): at 04:56

## 2022-01-01 RX ADMIN — Medication 40 MILLIEQUIVALENT(S): at 13:20

## 2022-01-01 RX ADMIN — Medication 3 MILLILITER(S): at 22:26

## 2022-01-01 RX ADMIN — Medication 40 MILLIEQUIVALENT(S): at 11:13

## 2022-01-01 RX ADMIN — MAGNESIUM OXIDE 400 MG ORAL TABLET 400 MILLIGRAM(S): 241.3 TABLET ORAL at 09:10

## 2022-01-01 RX ADMIN — POLYETHYLENE GLYCOL 3350 17 GRAM(S): 17 POWDER, FOR SOLUTION ORAL at 05:33

## 2022-01-01 RX ADMIN — POLYETHYLENE GLYCOL 3350 17 GRAM(S): 17 POWDER, FOR SOLUTION ORAL at 18:06

## 2022-01-01 RX ADMIN — ATORVASTATIN CALCIUM 10 MILLIGRAM(S): 80 TABLET, FILM COATED ORAL at 21:56

## 2022-01-01 RX ADMIN — Medication 1 DROP(S): at 05:43

## 2022-01-01 RX ADMIN — MAGNESIUM OXIDE 400 MG ORAL TABLET 400 MILLIGRAM(S): 241.3 TABLET ORAL at 13:21

## 2022-01-01 RX ADMIN — Medication 120 MILLIGRAM(S): at 09:10

## 2022-01-01 RX ADMIN — Medication 81 MILLIGRAM(S): at 11:37

## 2022-01-01 RX ADMIN — Medication 1 TABLET(S): at 12:48

## 2022-01-01 RX ADMIN — Medication 500 MILLIGRAM(S): at 15:35

## 2022-01-01 RX ADMIN — ATORVASTATIN CALCIUM 10 MILLIGRAM(S): 80 TABLET, FILM COATED ORAL at 21:35

## 2022-01-01 RX ADMIN — SODIUM CHLORIDE 50 MILLILITER(S): 9 INJECTION INTRAMUSCULAR; INTRAVENOUS; SUBCUTANEOUS at 09:47

## 2022-01-01 RX ADMIN — Medication 1 TABLET(S): at 12:46

## 2022-01-01 RX ADMIN — Medication 1 TABLET(S): at 12:06

## 2022-01-01 RX ADMIN — PIPERACILLIN AND TAZOBACTAM 25 GRAM(S): 4; .5 INJECTION, POWDER, LYOPHILIZED, FOR SOLUTION INTRAVENOUS at 05:17

## 2022-01-01 RX ADMIN — Medication 40 MILLIEQUIVALENT(S): at 15:35

## 2022-01-01 RX ADMIN — PIPERACILLIN AND TAZOBACTAM 25 GRAM(S): 4; .5 INJECTION, POWDER, LYOPHILIZED, FOR SOLUTION INTRAVENOUS at 11:44

## 2022-01-01 RX ADMIN — Medication 81 MILLIGRAM(S): at 12:00

## 2022-01-01 RX ADMIN — Medication 10 MILLIGRAM(S): at 15:33

## 2022-01-01 RX ADMIN — Medication 500 MILLIGRAM(S): at 13:13

## 2022-01-01 RX ADMIN — Medication 500 MILLIGRAM(S): at 13:28

## 2022-01-01 RX ADMIN — Medication 1 TABLET(S): at 08:58

## 2022-01-01 RX ADMIN — Medication 120 MILLIGRAM(S): at 08:52

## 2022-01-01 RX ADMIN — Medication 3 MILLILITER(S): at 07:38

## 2022-01-01 RX ADMIN — Medication 100 MILLIGRAM(S): at 13:22

## 2022-01-01 RX ADMIN — Medication 20 MILLIGRAM(S): at 05:25

## 2022-01-01 RX ADMIN — Medication 100 MILLIEQUIVALENT(S): at 05:14

## 2022-01-01 RX ADMIN — Medication 120 MILLIGRAM(S): at 09:03

## 2022-01-01 RX ADMIN — Medication 120 MILLIGRAM(S): at 21:25

## 2022-01-01 RX ADMIN — Medication 100 MILLIGRAM(S): at 14:02

## 2022-01-01 RX ADMIN — Medication 1 TABLET(S): at 13:21

## 2022-01-01 RX ADMIN — RIVAROXABAN 15 MILLIGRAM(S): KIT at 08:58

## 2022-01-01 RX ADMIN — Medication 25 GRAM(S): at 10:04

## 2022-01-01 RX ADMIN — Medication 120 MILLIGRAM(S): at 05:19

## 2022-01-01 RX ADMIN — Medication 500 MILLIGRAM(S): at 23:07

## 2022-01-01 RX ADMIN — POLYETHYLENE GLYCOL 3350 17 GRAM(S): 17 POWDER, FOR SOLUTION ORAL at 12:45

## 2022-01-01 RX ADMIN — Medication 30 MILLIGRAM(S): at 17:10

## 2022-01-01 RX ADMIN — Medication 500 MILLIGRAM(S): at 05:17

## 2022-01-01 RX ADMIN — Medication 100 MILLIGRAM(S): at 21:25

## 2022-01-01 RX ADMIN — Medication 100 MILLIGRAM(S): at 05:14

## 2022-01-01 RX ADMIN — Medication 3 MILLILITER(S): at 23:11

## 2022-01-01 RX ADMIN — Medication 20 MILLIGRAM(S): at 06:03

## 2022-01-01 RX ADMIN — RIVAROXABAN 15 MILLIGRAM(S): KIT at 18:06

## 2022-01-01 RX ADMIN — Medication 20 MILLIGRAM(S): at 06:49

## 2022-01-01 RX ADMIN — MAGNESIUM OXIDE 400 MG ORAL TABLET 400 MILLIGRAM(S): 241.3 TABLET ORAL at 18:11

## 2022-01-01 RX ADMIN — Medication 120 MILLIGRAM(S): at 22:18

## 2022-01-01 RX ADMIN — Medication 10 MILLIGRAM(S): at 12:12

## 2022-01-01 RX ADMIN — Medication 3 MILLILITER(S): at 04:04

## 2022-01-01 RX ADMIN — Medication 20 MILLIGRAM(S): at 05:17

## 2022-01-01 RX ADMIN — Medication 20 MILLIGRAM(S): at 04:50

## 2022-01-01 RX ADMIN — Medication 3 MILLILITER(S): at 14:45

## 2022-01-01 RX ADMIN — Medication 3 MILLILITER(S): at 04:06

## 2022-01-01 RX ADMIN — Medication 500 MILLIGRAM(S): at 04:50

## 2022-01-01 RX ADMIN — Medication 1 APPLICATION(S): at 12:46

## 2022-01-01 RX ADMIN — Medication 100 MILLIGRAM(S): at 18:06

## 2022-01-01 RX ADMIN — Medication 3 MILLILITER(S): at 15:16

## 2022-01-01 RX ADMIN — Medication 3 MILLILITER(S): at 09:12

## 2022-01-01 RX ADMIN — Medication 1 APPLICATION(S): at 21:24

## 2022-01-01 RX ADMIN — Medication 3 MILLILITER(S): at 09:13

## 2022-01-01 RX ADMIN — Medication 2 PACKET(S): at 17:51

## 2022-01-01 RX ADMIN — ATORVASTATIN CALCIUM 10 MILLIGRAM(S): 80 TABLET, FILM COATED ORAL at 23:07

## 2022-01-01 RX ADMIN — Medication 3 MILLILITER(S): at 04:30

## 2022-01-01 RX ADMIN — Medication 2 PACKET(S): at 09:43

## 2022-01-01 RX ADMIN — Medication 30 MILLIGRAM(S): at 05:19

## 2022-01-01 RX ADMIN — PIPERACILLIN AND TAZOBACTAM 25 GRAM(S): 4; .5 INJECTION, POWDER, LYOPHILIZED, FOR SOLUTION INTRAVENOUS at 23:26

## 2022-01-01 RX ADMIN — PIPERACILLIN AND TAZOBACTAM 25 GRAM(S): 4; .5 INJECTION, POWDER, LYOPHILIZED, FOR SOLUTION INTRAVENOUS at 13:01

## 2022-01-01 RX ADMIN — Medication 100 MILLIGRAM(S): at 05:44

## 2022-01-01 RX ADMIN — POLYETHYLENE GLYCOL 3350 17 GRAM(S): 17 POWDER, FOR SOLUTION ORAL at 17:51

## 2022-01-01 RX ADMIN — Medication 3 MILLILITER(S): at 21:45

## 2022-01-01 RX ADMIN — Medication 100 MILLIEQUIVALENT(S): at 06:24

## 2022-01-01 RX ADMIN — Medication 500 MILLIGRAM(S): at 17:43

## 2022-01-01 RX ADMIN — Medication 1 DROP(S): at 17:51

## 2022-01-01 RX ADMIN — Medication 100 MILLIEQUIVALENT(S): at 10:47

## 2022-01-01 RX ADMIN — Medication 500 MILLIGRAM(S): at 17:10

## 2022-01-01 RX ADMIN — PIPERACILLIN AND TAZOBACTAM 25 GRAM(S): 4; .5 INJECTION, POWDER, LYOPHILIZED, FOR SOLUTION INTRAVENOUS at 22:22

## 2022-01-01 RX ADMIN — Medication 1 TABLET(S): at 12:14

## 2022-01-01 RX ADMIN — Medication 100 MILLIGRAM(S): at 08:05

## 2022-01-01 RX ADMIN — PIPERACILLIN AND TAZOBACTAM 25 GRAM(S): 4; .5 INJECTION, POWDER, LYOPHILIZED, FOR SOLUTION INTRAVENOUS at 17:38

## 2022-01-01 RX ADMIN — Medication 100 MILLIGRAM(S): at 15:32

## 2022-01-01 RX ADMIN — Medication 500 MILLIGRAM(S): at 16:59

## 2022-01-01 RX ADMIN — Medication 81 MILLIGRAM(S): at 13:13

## 2022-01-01 RX ADMIN — POTASSIUM PHOSPHATE, MONOBASIC POTASSIUM PHOSPHATE, DIBASIC 62.5 MILLIMOLE(S): 236; 224 INJECTION, SOLUTION INTRAVENOUS at 09:29

## 2022-01-01 RX ADMIN — POLYETHYLENE GLYCOL 3350 17 GRAM(S): 17 POWDER, FOR SOLUTION ORAL at 18:35

## 2022-01-01 RX ADMIN — Medication 120 MILLIGRAM(S): at 22:42

## 2022-01-01 RX ADMIN — RIVAROXABAN 15 MILLIGRAM(S): KIT at 17:51

## 2022-01-01 RX ADMIN — SODIUM CHLORIDE 75 MILLILITER(S): 9 INJECTION, SOLUTION INTRAVENOUS at 07:00

## 2022-01-01 RX ADMIN — Medication 1 APPLICATION(S): at 13:52

## 2022-01-01 RX ADMIN — Medication 1 TABLET(S): at 13:28

## 2022-01-01 RX ADMIN — Medication 3 MILLILITER(S): at 22:18

## 2022-01-01 RX ADMIN — AMPICILLIN SODIUM AND SULBACTAM SODIUM 200 GRAM(S): 250; 125 INJECTION, POWDER, FOR SUSPENSION INTRAMUSCULAR; INTRAVENOUS at 00:38

## 2022-01-01 RX ADMIN — Medication 500 MILLIGRAM(S): at 18:11

## 2022-01-01 RX ADMIN — Medication 1 APPLICATION(S): at 14:04

## 2022-01-01 RX ADMIN — Medication 3 MILLILITER(S): at 15:55

## 2022-01-01 RX ADMIN — PIPERACILLIN AND TAZOBACTAM 25 GRAM(S): 4; .5 INJECTION, POWDER, LYOPHILIZED, FOR SOLUTION INTRAVENOUS at 22:43

## 2022-01-01 RX ADMIN — PIPERACILLIN AND TAZOBACTAM 25 GRAM(S): 4; .5 INJECTION, POWDER, LYOPHILIZED, FOR SOLUTION INTRAVENOUS at 22:20

## 2022-01-01 RX ADMIN — Medication 1 TABLET(S): at 12:00

## 2022-01-01 RX ADMIN — ATORVASTATIN CALCIUM 10 MILLIGRAM(S): 80 TABLET, FILM COATED ORAL at 21:40

## 2022-01-01 RX ADMIN — Medication 120 MILLIGRAM(S): at 01:36

## 2022-01-01 RX ADMIN — Medication 81 MILLIGRAM(S): at 12:45

## 2022-01-01 RX ADMIN — Medication 81 MILLIGRAM(S): at 12:11

## 2022-01-01 RX ADMIN — Medication 2 PACKET(S): at 18:30

## 2022-01-01 RX ADMIN — Medication 100 MILLIGRAM(S): at 21:20

## 2022-01-01 RX ADMIN — Medication 1 TABLET(S): at 11:36

## 2022-01-01 RX ADMIN — SENNA PLUS 2 TABLET(S): 8.6 TABLET ORAL at 21:27

## 2022-01-01 RX ADMIN — Medication 3 MILLILITER(S): at 11:05

## 2022-01-01 RX ADMIN — ATORVASTATIN CALCIUM 10 MILLIGRAM(S): 80 TABLET, FILM COATED ORAL at 22:18

## 2022-01-01 RX ADMIN — Medication 20 MILLIGRAM(S): at 11:44

## 2022-01-01 RX ADMIN — SODIUM CHLORIDE 75 MILLILITER(S): 9 INJECTION, SOLUTION INTRAVENOUS at 10:07

## 2022-01-01 RX ADMIN — Medication 100 MILLIGRAM(S): at 11:36

## 2022-01-01 RX ADMIN — Medication 500 MILLIGRAM(S): at 05:25

## 2022-01-01 RX ADMIN — RIVAROXABAN 15 MILLIGRAM(S): KIT at 18:36

## 2022-01-01 RX ADMIN — Medication 100 MILLIGRAM(S): at 05:47

## 2022-01-01 RX ADMIN — Medication 75 MILLIGRAM(S): at 15:09

## 2022-01-01 RX ADMIN — Medication 81 MILLIGRAM(S): at 12:14

## 2022-01-01 RX ADMIN — PIPERACILLIN AND TAZOBACTAM 25 GRAM(S): 4; .5 INJECTION, POWDER, LYOPHILIZED, FOR SOLUTION INTRAVENOUS at 06:03

## 2022-01-01 RX ADMIN — Medication 120 MILLIGRAM(S): at 04:49

## 2022-01-01 RX ADMIN — POLYETHYLENE GLYCOL 3350 17 GRAM(S): 17 POWDER, FOR SOLUTION ORAL at 05:19

## 2022-01-01 RX ADMIN — Medication 100 MILLIGRAM(S): at 18:24

## 2022-01-01 RX ADMIN — Medication 100 MILLIGRAM(S): at 23:01

## 2022-01-01 RX ADMIN — POLYETHYLENE GLYCOL 3350 17 GRAM(S): 17 POWDER, FOR SOLUTION ORAL at 05:43

## 2022-01-01 RX ADMIN — Medication 81 MILLIGRAM(S): at 13:28

## 2022-01-01 RX ADMIN — Medication 500 MILLIGRAM(S): at 11:37

## 2022-01-01 RX ADMIN — Medication 1 DROP(S): at 05:35

## 2022-01-01 RX ADMIN — Medication 25 GRAM(S): at 09:39

## 2022-01-01 RX ADMIN — Medication 100 MILLIGRAM(S): at 12:11

## 2022-01-01 RX ADMIN — Medication 1 TABLET(S): at 12:45

## 2022-01-01 RX ADMIN — PIPERACILLIN AND TAZOBACTAM 25 GRAM(S): 4; .5 INJECTION, POWDER, LYOPHILIZED, FOR SOLUTION INTRAVENOUS at 14:07

## 2022-01-01 RX ADMIN — Medication 100 MILLIGRAM(S): at 21:34

## 2022-01-01 RX ADMIN — Medication 120 MILLIGRAM(S): at 21:36

## 2022-01-01 RX ADMIN — ATORVASTATIN CALCIUM 10 MILLIGRAM(S): 80 TABLET, FILM COATED ORAL at 22:28

## 2022-01-01 RX ADMIN — POTASSIUM PHOSPHATE, MONOBASIC POTASSIUM PHOSPHATE, DIBASIC 83.33 MILLIMOLE(S): 236; 224 INJECTION, SOLUTION INTRAVENOUS at 10:06

## 2022-01-01 RX ADMIN — PIPERACILLIN AND TAZOBACTAM 25 GRAM(S): 4; .5 INJECTION, POWDER, LYOPHILIZED, FOR SOLUTION INTRAVENOUS at 15:35

## 2022-01-01 RX ADMIN — PIPERACILLIN AND TAZOBACTAM 25 GRAM(S): 4; .5 INJECTION, POWDER, LYOPHILIZED, FOR SOLUTION INTRAVENOUS at 05:20

## 2022-01-01 RX ADMIN — Medication 500 MILLIGRAM(S): at 12:06

## 2022-01-01 RX ADMIN — Medication 120 MILLIGRAM(S): at 09:38

## 2022-01-01 RX ADMIN — Medication 1 APPLICATION(S): at 12:15

## 2022-01-01 RX ADMIN — Medication 100 MILLIGRAM(S): at 00:40

## 2022-01-01 RX ADMIN — Medication 120 MILLIGRAM(S): at 08:54

## 2022-01-01 RX ADMIN — Medication 81 MILLIGRAM(S): at 08:58

## 2022-01-01 RX ADMIN — Medication 3 MILLILITER(S): at 03:36

## 2022-01-01 RX ADMIN — Medication 120 MILLIGRAM(S): at 21:56

## 2022-01-01 RX ADMIN — Medication 3 MILLILITER(S): at 15:48

## 2022-01-01 RX ADMIN — Medication 120 MILLIGRAM(S): at 08:48

## 2022-01-01 RX ADMIN — Medication 120 MILLIGRAM(S): at 09:22

## 2022-11-24 NOTE — ED ADULT NURSE NOTE - CHIEF COMPLAINT QUOTE
Pt brought in by family for bleeding from rectum. Family states pt had 2 episodes today. On Xarelto, family does not know why. No chest pain, sob, abd pain, n/v/d, fevers/chills verbalized.  Pmhx: HTN.

## 2022-11-24 NOTE — ED PROVIDER NOTE - PROGRESS NOTE DETAILS
Jacqueline Lobo DO (PGY2): CT scan showing proctocolitis, will give dose of unasyn in ED. No signs of active bleeding on CTA. CT showing possible thrombosed hemorrhoid, however clinical suspicion for thrombosed hemorrhoid low at this time. No hemorrhoids noted on exam, rectal exam nonpainful. Skin tags noted on exam. Pt initial hgb 11, will repeat CBC at 1am. Jacqueline Lobo DO (PGY2): CT scan showing proctocolitis, will give dose of unasyn in ED. No signs of active bleeding on CTA. CT showing possible thrombosed hemorrhoid, however clinical suspicion for thrombosed hemorrhoid low at this time. No hemorrhoids noted on exam, rectal exam nonpainful. Skin tags noted on exam. Pt initial hgb 11.6, will repeat CBC at 1am. Jacqueline Lobo DO (PGY2): Pt repeat CBC showing hemoglobin 10.1. Given decreasing hemoglobin in setting of rectal bleeding, will admit for trending CBC, IV abx for proctocolitis and GI for possible endoscopy to evaluate for PUS. Given CHF, if pt requires transfusion, would give lasix. Spoke with hospitalist, will admit to her service.

## 2022-11-24 NOTE — ED PROVIDER NOTE - PHYSICAL EXAMINATION
GENERAL: Awake. Alert. NAD. Well nourished.  HEENT: NC/AT, PERRL, EOMI, Conjunctiva pink, no scleral icterus. Airway patent. Moist mucous membranes.  LUNGS: CTAB. No wheezes or rales noted.  CARDIAC: Chest non-tender to palpation. RRR.  ABDOMEN: No masses noted. Soft, NT, ND, no rebound, no guarding.  Rectal exam: Chaperoned by Lavonne GRIFFITH. No hemmorrhoids or fissures noted on external exam. No masses palpated. Maroon stool present.   BACK: No midline spinal tenderness, no CVA tenderness  EXT: No edema, no calf tenderness, distal pulses 2+ bilaterally  NEURO: A&Ox3. Moving all extremities. Sensation and strength intact throughout.   SKIN: Warm and dry. Pallor noted.  PSYCH: Normal affect. GENERAL: Awake. Alert. NAD. Well nourished.  HEENT: NC/AT, PERRL, EOMI, Conjunctiva pink, no scleral icterus. Airway patent. Moist mucous membranes.  LUNGS: CTAB. No wheezes or rales noted.  CARDIAC: Chest non-tender to palpation. RRR.  ABDOMEN: No masses noted. Soft, NT, ND, no rebound, no guarding.  Rectal exam: Chaperoned by Lavonne GRIFFITH. No hemmorrhoids or fissures noted on external exam. No masses palpated. Maroon stool present.   BACK: No midline spinal tenderness, no CVA tenderness  EXT: B/l LE edema, no calf tenderness, distal pulses 2+ bilaterally  NEURO: A&Ox3. Moving all extremities. Sensation and strength intact throughout.   SKIN: Warm and dry. Pallor noted.  PSYCH: Normal affect.

## 2022-11-24 NOTE — ED PROVIDER NOTE - PRINCIPAL DIAGNOSIS
Pt calling in due to being on hold for 1 hour waiting for BNN to log on. Pt stated she is available at this time.  Please advise
Proctocolitis

## 2022-11-24 NOTE — ED PROVIDER NOTE - ATTENDING CONTRIBUTION TO CARE
DR. YUAN, ATTENDING MD-  I performed a face to face bedside interview with the patient regarding history of present illness, review of symptoms and past medical history. I completed an independent physical exam.  I have discussed the patient's plan of care with the resident.   Documentation as above in the note.    87-year-old female history of CHF on Xarelto here with daughter for episode of bright red blood per rectum.  Patient denies any abdominal pain, lightheadedness, syncope.  Obtain CBC CMP coags type and screen CT abdomen pelvis and reassess.

## 2022-11-24 NOTE — ED PROVIDER NOTE - OBJECTIVE STATEMENT
87F PMH colon cancer s/p resection (no treatment), afib on xarelto, HTN, HLD presenting with 2 episodes of maroon stool today, no associated abdominal pain, hematemesis, nausea, fever. Pt denies hematuria, recent fall, chest pain, sob, exertional dyspnea, dysuria. Pt lives at home with daughter who is her full time aide. Pt uses walker at baseline to ambulate. 87F PMH CHF, colon cancer s/p resection (no treatment), afib on xarelto, HTN, HLD presenting with 2 episodes of maroon stool today, no associated abdominal pain, hematemesis, nausea, fever. Pt denies hematuria, recent fall, chest pain, sob, exertional dyspnea, dysuria. Pt lives at home with daughter who is her full time aide. Pt uses walker at baseline to ambulate.

## 2022-11-24 NOTE — ED PROVIDER NOTE - CLINICAL SUMMARY MEDICAL DECISION MAKING FREE TEXT BOX
87F PMH CHF colon cancer s/p resection (no treatment), afib on xarelto, HTN, HLD presenting with 2 episodes of maroon stool today, no associated abdominal pain, hematemesis, nausea, fever. Given hx and physical, ddx includes but is not limited to anemia, PUD, diverticulosis, electrolyte abnormalities. Pt with hx of CHF and b/l LE edema and takes lasix at home. Lower concern for chf exac given no dyspnea, but will obtain CXR, trop bnp given anticipation for possible transfusion need. Plan for labs, cxr, CT, reassess

## 2022-11-24 NOTE — ED ADULT TRIAGE NOTE - CHIEF COMPLAINT QUOTE
Pt brought in by family for bleeding from rectum. Family states pt had 2 episodes today. On Xarelto. No chest pain, sob, abd pain, n/v/d, fevers/chills verbalized.  Pmhx: HTN. Pt brought in by family for bleeding from rectum. Family states pt had 2 episodes today. On Xarelto, family does not know why. No chest pain, sob, abd pain, n/v/d, fevers/chills verbalized.  Pmhx: HTN.

## 2022-11-25 NOTE — H&P ADULT - NSHPLABSRESULTS_GEN_ALL_CORE
.  LABS:                         10.1   16.51 )-----------( 181      ( 25 Nov 2022 01:30 )             31.3     11-24    136  |  97<L>  |  22  ----------------------------<  137<H>  3.3<L>   |  26  |  0.76    Ca    9.5      24 Nov 2022 22:14  Mg     1.60     11-24    TPro  7.6  /  Alb  3.7  /  TBili  0.4  /  DBili  x   /  AST  35<H>  /  ALT  10  /  AlkPhos  84  11-24    PT/INR - ( 24 Nov 2022 22:14 )   PT: 38.8 sec;   INR: 3.30 ratio         PTT - ( 24 Nov 2022 22:14 )  PTT:38.9 sec    Serum Pro-Brain Natriuretic Peptide: 2238 pg/mL (11-24 @ 22:14)        RADIOLOGY, EKG & ADDITIONAL TESTS: Reviewed.

## 2022-11-25 NOTE — H&P ADULT - NSHPPHYSICALEXAM_GEN_ALL_CORE
LOS:     VITALS:   T(C): 36.6 (11-25-22 @ 04:04), Max: 36.6 (11-25-22 @ 03:05)  HR: 69 (11-25-22 @ 04:04) (69 - 73)  BP: 117/69 (11-25-22 @ 04:04) (110/67 - 150/57)  RR: 18 (11-25-22 @ 04:04) (17 - 20)  SpO2: 98% (11-25-22 @ 04:04) (98% - 100%)    GENERAL: NAD, lying in bed comfortably  HEAD:  Atraumatic, Normocephalic  EYES: EOMI, PERRLA, conjunctiva and sclera clear  ENT: Moist mucous membranes  NECK: Supple, No JVD  CHEST/LUNG: Clear to auscultation bilaterally; No rales, rhonchi, wheezing, or rubs. Unlabored respirations  HEART: Regular rate and rhythm; No murmurs, rubs, or gallops  ABDOMEN: BSx4; Soft, nontender, nondistended  EXTREMITIES:  2+ Peripheral Pulses, brisk capillary refill. No clubbing, cyanosis, or edema  NERVOUS SYSTEM:  A&Ox3, no focal deficits   SKIN: No rashes or lesions  Psych: Normal mood and affect

## 2022-11-25 NOTE — H&P ADULT - HISTORY OF PRESENT ILLNESS
87F PMH CHF, colon cancer s/p resection (no treatment), afib on xarelto, HTN, HLD presenting with maroon stool today. Per daughter, pt had 2 episodes of maroon stool in yesterday. She has not had any episodes today. Per pt she has never had any episodes like this in the past. Pt denies any abdominal pain, chest pain, SOB, N/V, dizziness, hematuria, fever, chills. Denies any change in diet or medications.     In the ED, pt afebrile, HR 72, /67 and saturating well on RA. Given 1x unasyn, duonebs and tylenol  87F PMH colon cancer s/p resection (no treatment), afib on xarelto, HTN, HLD presenting with maroon stool today. Per daughter, pt had 2 episodes of maroon stool in yesterday. She has not had any episodes today. Per pt she has never had any episodes like this in the past. Pt denies any abdominal pain, chest pain, SOB, N/V, dizziness, hematuria, fever, chills. Denies any change in diet or medications.     In the ED, pt afebrile, HR 72, /67 and saturating well on RA. Given 1x unasyn, duonebs and tylenol. Hgb dropped from 11.6 -> 10.1

## 2022-11-25 NOTE — ED ADULT NURSE REASSESSMENT NOTE - NS ED NURSE REASSESS COMMENT FT1
Handoff receive pt in bed A*0x3, NAD, 20G in left a/c lab sent, daughter at bedside, no complaints. Handoff receive pt in bed A*0x2, NAD, 20G in left a/c lab sent, daughter at bedside, no complaints.

## 2022-11-25 NOTE — H&P ADULT - PROBLEM SELECTOR PLAN 3
-Pt takes sotolol and xarelto at home  -Will hold xarelto due to maroon stool -Pt takes sotolol and xarelto at home  -Will hold xarelto due to maroon stool  -Will need to check EKG before starting pt home sotalol dose

## 2022-11-25 NOTE — CONSULT NOTE ADULT - SUBJECTIVE AND OBJECTIVE BOX
GENERAL SURGERY CONSULT NOTE  Consulting surgical team: A team surgery  Consulting attending: Dr. Schofield     HPI:  HPI:  87F PMH colon cancer s/p resection (no treatment), afib on xarelto, HTN, HLD presenting with maroon stool today. Per daughter, pt had 2 episodes of maroon stool in yesterday. She has not had any episodes today. Per pt she has never had any episodes like this in the past. Pt denies any abdominal pain, chest pain, SOB, N/V, dizziness, hematuria, fever, chills. Denies any change in diet or medications.     In the ED, pt afebrile, HR 72, /67 and saturating well on RA. Given 1x unasyn, duonebs and tylenol. Hgb dropped from 11.6 -> 10.1 (25 Nov 2022 04:52)    87F with hx of rectal cancer s/p polypectomy in 2015 with Dr. Zamudio, no repeat colonoscopy, a fib on xarelto, HTN, HLD p/w 2 episodes of melena. Patient has been hemodynamically stable with no transfusion needs. CTA showed 1.2 x 0.7 cm hypoattenuating focus in left anorectal region, ?thrombosed hemorrhoid. Patient reports no anal pain, constipation.     PAST MEDICAL HISTORY:  Atrial fibrillation        PAST SURGICAL HISTORY:  History of cholecystectomy        SOCIAL HISTORY:  - Denies EtOH abuse, smoking, IVDA    MEDICATIONS:  atorvastatin 10 milliGRAM(s) Oral at bedtime  furosemide    Tablet 20 milliGRAM(s) Oral daily  methimazole 5 milliGRAM(s) Oral daily  piperacillin/tazobactam IVPB. 3.375 Gram(s) IV Intermittent once  piperacillin/tazobactam IVPB.- 3.375 Gram(s) IV Intermittent once  piperacillin/tazobactam IVPB.. 3.375 Gram(s) IV Intermittent every 8 hours  sotalol. 120 milliGRAM(s) Oral <User Schedule>      ALLERGIES:  No Known Allergies      VITALS & I/Os:  Vital Signs Last 24 Hrs  T(C): 36.7 (25 Nov 2022 06:50), Max: 36.7 (25 Nov 2022 06:50)  T(F): 98 (25 Nov 2022 06:50), Max: 98 (25 Nov 2022 06:50)  HR: 71 (25 Nov 2022 06:50) (69 - 73)  BP: 119/71 (25 Nov 2022 06:50) (110/67 - 150/57)  BP(mean): --  RR: 17 (25 Nov 2022 06:50) (17 - 20)  SpO2: 99% (25 Nov 2022 06:50) (98% - 100%)    Parameters below as of 25 Nov 2022 06:50  Patient On (Oxygen Delivery Method): room air        I&O's Summary      PHYSICAL EXAM:  GEN: resting comfortably in bed, in NAD  RESP: no acute respiratory distress, breathing comfortably   ABD: soft, non-distended, non-tender   VALE: external skin tags, no mass/hemorrhoid palpated. +blood   EXT:  WWP, CARYL   NEURO:  no focal neuro deficits     LABS:                        10.1   16.51 )-----------( 181      ( 25 Nov 2022 01:30 )             31.3     11-25    140  |  101  |  18  ----------------------------<  102<H>  3.7   |  25  |  0.75    Ca    9.2      25 Nov 2022 10:58  Phos  3.5     11-25  Mg     1.70     11-25    TPro  7.6  /  Alb  3.7  /  TBili  0.4  /  DBili  x   /  AST  35<H>  /  ALT  10  /  AlkPhos  84  11-24    Lactate:    PT/INR - ( 25 Nov 2022 10:58 )   PT: 25.9 sec;   INR: 2.21 ratio         PTT - ( 24 Nov 2022 22:14 )  PTT:38.9 sec              IMAGING:  < from: CT Angio Abdomen and Pelvis w/ IV Cont (11.24.22 @ 23:22) >  COMPARISON: None.    CONTRAST/COMPLICATIONS:  IV Contrast: Omnipaque 350  90 cc administered   10 cc discarded  Oral Contrast: NONE  Complications: None reported at time of study completion    PROCEDURE:  GI bleeding protocol.  CT of the Abdomen and Pelvis was performed.  Precontrast, Arterial and Delayed phaseswere performed.  Sagittal and coronal reformats were performed.    FINDINGS:  LOWER CHEST: Bibasilar subsegmental atelectasis.  Calcified granulomas in   the right middle lobe and right lower lobe.  Cardiomegaly.  Heavy   atherosclerotic calcification of the visualized coronary arteries.    LIVER: Punctate parenchymal calcification, likely a granuloma.  BILE DUCTS: Normal caliber.  GALLBLADDER: Not visualized.  SPLEEN: Within normal limits.  PANCREAS: Atrophic.  ADRENALS: Within normal limits.  KIDNEYS/URETERS: Within normal limits.    BLADDER: Within normal limits.  REPRODUCTIVE ORGANS: Uterus and adnexa appear within normal limits.    BOWEL: Small hiatal hernia.  No bowel obstruction.  Appendix not   definitively visualized; no secondary signs of appendicitis.  There is   circumferential wall thickening in the descending colon, sigmoid colon   and rectum with mucosal hyperemia.  Minimal diverticulosis in the distal   descending colon without evidence for acute diverticulitis.  No evidence   of active contrast extravasation into the lumen gastrointestinal tract on   arterial or venous phase images.  There is a 12 x 7 mm ovoid   hypoattenuating focus in the wall of the anorectal region on the left   side with mild peripheral enhancement (5:97).  PERITONEUM: No ascites, free air or abscess.  VESSELS: Atherosclerotic calcifications of the aortoiliac tree and   proximal thigh vasculature.  RETROPERITONEUM/LYMPH NODES: No lymphadenopathy.  ABDOMINAL WALL: There is a left periumbilicalventral abdominal wall   hernia containing short segment nonobstructed loop of transverse colon.    There is an infraumbilical ventral abdominal wall hernia containing a   short segment nonobstructed small bowel loop.  BONES: Bones are osteopenic.  Degenerative changes in the spine and hips.    No acute fracture or suspicious osseous lesion.    IMPRESSION:  Circumferential wall thickening in the descending colon, sigmoid colon   and rectum with mucosal hyperemia, compatible with proctocolitis.  No   evidence of active contrast extravasation into the lumen of the   gastrointestinal tract at the time of CTA.    There is a 12 x 7 mm ovoid hypoattenuating focus in the wall of the   anorectal region on the left side, with mild peripheral enhancement,   possibly a thrombosed hemorrhoid.  Correlate with physical exams findings.    < end of copied text >   GENERAL SURGERY CONSULT NOTE  Consulting surgical team: A team surgery  Consulting attending: Dr. Schofield     HPI:  HPI:  87F PMH colon cancer s/p resection (no treatment), afib on xarelto, HTN, HLD presenting with maroon stool today. Per daughter, pt had 2 episodes of maroon stool in yesterday. She has not had any episodes today. Per pt she has never had any episodes like this in the past. Pt denies any abdominal pain, chest pain, SOB, N/V, dizziness, hematuria, fever, chills. Denies any change in diet or medications.     In the ED, pt afebrile, HR 72, /67 and saturating well on RA. Given 1x unasyn, duonebs and tylenol. Hgb dropped from 11.6 -> 10.1 (25 Nov 2022 04:52)    87F with hx of rectal cancer s/p polypectomy in 2015 with Dr. Zamudio, no repeat colonoscopy, a fib on xarelto, HTN, HLD p/w 2 episodes of melena. Patient has been hemodynamically stable with no transfusion needs. CTA showed 1.2 x 0.7 cm hypoattenuating focus in left anorectal region, ?thrombosed hemorrhoid. Patient reports no anal pain, constipation.     PAST MEDICAL HISTORY:  Atrial fibrillation        PAST SURGICAL HISTORY:  History of cholecystectomy        SOCIAL HISTORY:  - Denies EtOH abuse, smoking, IVDA    MEDICATIONS:  atorvastatin 10 milliGRAM(s) Oral at bedtime  furosemide    Tablet 20 milliGRAM(s) Oral daily  methimazole 5 milliGRAM(s) Oral daily  piperacillin/tazobactam IVPB. 3.375 Gram(s) IV Intermittent once  piperacillin/tazobactam IVPB.- 3.375 Gram(s) IV Intermittent once  piperacillin/tazobactam IVPB.. 3.375 Gram(s) IV Intermittent every 8 hours  sotalol. 120 milliGRAM(s) Oral <User Schedule>      ALLERGIES:  No Known Allergies      VITALS & I/Os:  Vital Signs Last 24 Hrs  T(C): 36.7 (25 Nov 2022 06:50), Max: 36.7 (25 Nov 2022 06:50)  T(F): 98 (25 Nov 2022 06:50), Max: 98 (25 Nov 2022 06:50)  HR: 71 (25 Nov 2022 06:50) (69 - 73)  BP: 119/71 (25 Nov 2022 06:50) (110/67 - 150/57)  BP(mean): --  RR: 17 (25 Nov 2022 06:50) (17 - 20)  SpO2: 99% (25 Nov 2022 06:50) (98% - 100%)    Parameters below as of 25 Nov 2022 06:50  Patient On (Oxygen Delivery Method): room air        I&O's Summary      PHYSICAL EXAM:  GEN: resting comfortably in bed, in NAD  RESP: no acute respiratory distress, breathing comfortably   ABD: soft, non-distended, non-tender   VALE: external skin tags, no mass/thrombosed hemorrhoid palpated. +blood   EXT:  WWP, CARYL   NEURO:  no focal neuro deficits     LABS:                        10.1   16.51 )-----------( 181      ( 25 Nov 2022 01:30 )             31.3     11-25    140  |  101  |  18  ----------------------------<  102<H>  3.7   |  25  |  0.75    Ca    9.2      25 Nov 2022 10:58  Phos  3.5     11-25  Mg     1.70     11-25    TPro  7.6  /  Alb  3.7  /  TBili  0.4  /  DBili  x   /  AST  35<H>  /  ALT  10  /  AlkPhos  84  11-24    Lactate:    PT/INR - ( 25 Nov 2022 10:58 )   PT: 25.9 sec;   INR: 2.21 ratio         PTT - ( 24 Nov 2022 22:14 )  PTT:38.9 sec              IMAGING:  < from: CT Angio Abdomen and Pelvis w/ IV Cont (11.24.22 @ 23:22) >  COMPARISON: None.    CONTRAST/COMPLICATIONS:  IV Contrast: Omnipaque 350  90 cc administered   10 cc discarded  Oral Contrast: NONE  Complications: None reported at time of study completion    PROCEDURE:  GI bleeding protocol.  CT of the Abdomen and Pelvis was performed.  Precontrast, Arterial and Delayed phaseswere performed.  Sagittal and coronal reformats were performed.    FINDINGS:  LOWER CHEST: Bibasilar subsegmental atelectasis.  Calcified granulomas in   the right middle lobe and right lower lobe.  Cardiomegaly.  Heavy   atherosclerotic calcification of the visualized coronary arteries.    LIVER: Punctate parenchymal calcification, likely a granuloma.  BILE DUCTS: Normal caliber.  GALLBLADDER: Not visualized.  SPLEEN: Within normal limits.  PANCREAS: Atrophic.  ADRENALS: Within normal limits.  KIDNEYS/URETERS: Within normal limits.    BLADDER: Within normal limits.  REPRODUCTIVE ORGANS: Uterus and adnexa appear within normal limits.    BOWEL: Small hiatal hernia.  No bowel obstruction.  Appendix not   definitively visualized; no secondary signs of appendicitis.  There is   circumferential wall thickening in the descending colon, sigmoid colon   and rectum with mucosal hyperemia.  Minimal diverticulosis in the distal   descending colon without evidence for acute diverticulitis.  No evidence   of active contrast extravasation into the lumen gastrointestinal tract on   arterial or venous phase images.  There is a 12 x 7 mm ovoid   hypoattenuating focus in the wall of the anorectal region on the left   side with mild peripheral enhancement (5:97).  PERITONEUM: No ascites, free air or abscess.  VESSELS: Atherosclerotic calcifications of the aortoiliac tree and   proximal thigh vasculature.  RETROPERITONEUM/LYMPH NODES: No lymphadenopathy.  ABDOMINAL WALL: There is a left periumbilicalventral abdominal wall   hernia containing short segment nonobstructed loop of transverse colon.    There is an infraumbilical ventral abdominal wall hernia containing a   short segment nonobstructed small bowel loop.  BONES: Bones are osteopenic.  Degenerative changes in the spine and hips.    No acute fracture or suspicious osseous lesion.    IMPRESSION:  Circumferential wall thickening in the descending colon, sigmoid colon   and rectum with mucosal hyperemia, compatible with proctocolitis.  No   evidence of active contrast extravasation into the lumen of the   gastrointestinal tract at the time of CTA.    There is a 12 x 7 mm ovoid hypoattenuating focus in the wall of the   anorectal region on the left side, with mild peripheral enhancement,   possibly a thrombosed hemorrhoid.  Correlate with physical exams findings.    < end of copied text >

## 2022-11-25 NOTE — CONSULT NOTE ADULT - SUBJECTIVE AND OBJECTIVE BOX
HPI:  Jessie Mcdonough is a 87F PMH colon cancer s/p resection (no treatment), afib on xarelto, HTN, HLD presenting with one day of hematochezia and found to have proctocolitis on imaging. GI consulted for further workup and management of the above.     MS. Mcdonough noted a history of chronic constipation with one BM every other day or so. More recently she has been feeling very constipated as compared to her baseline. her daughter notes that yesterday morning she had some bright red blood in her diaper. At first she didn't make much of it although she had another episode in the afternoon prompting her to come into the ED. In the ED, pt afebrile, HR 72, /67 and saturating well on RA. Labs on admission notable for a hgb of 11.6 -> 10.1 with  CTA showed 1.2 x 0.7 cm hypoattenuating focus in left anorectal region, ?thrombosed hemorrhoid and some underlying proctocolitis. Pt was give1x unasyn, duonebs and tylenol and admitted for further evaluation. Pt overall clinically feeling well at this tome. Pt denies any ongoing fevers, chills, cp, sob, n/v/abdominal pain or any ongoing rectal pain. GI review of systems negative for dysphagia, odynophagia, early satiety, weight loss. Denies recent travel or sick contacts. Regarding medications, patient currently on AC althoug denies any other antiplatelet or NSAID use.   No melena or hematemesis. Last c-scope was at the time of colon cancer diagnosis around 7 years although none since that time.     Allergies:  No Known Allergies    Home Medications:  furosemide 20 mg oral tablet: 1 tab(s) orally once a day (25 Nov 2022 04:51)  methIMAzole 5 mg oral tablet: 1 tab(s) orally once a day (25 Nov 2022 04:51)  pravastatin 20 mg oral tablet: 1 tab(s) orally once a day (25 Nov 2022 04:51)  sotalol 120 mg oral tablet: 1 tab(s) orally once a day (25 Nov 2022 04:51)  Xarelto 15 mg oral tablet: 1 tab(s) orally once a day (in the evening) (25 Nov 2022 04:51)      Hospital Medications:  atorvastatin 10 milliGRAM(s) Oral at bedtime  furosemide    Tablet 20 milliGRAM(s) Oral daily  methimazole 5 milliGRAM(s) Oral daily  piperacillin/tazobactam IVPB. 3.375 Gram(s) IV Intermittent once  piperacillin/tazobactam IVPB.- 3.375 Gram(s) IV Intermittent once  piperacillin/tazobactam IVPB.. 3.375 Gram(s) IV Intermittent every 8 hours  sotalol. 120 milliGRAM(s) Oral <User Schedule>      PMHX/PSHX:    Atrial fibrillation  HTN   HLD   ?Colon Ca   History of cholecystectomy    Social History:   Tob: Denies  EtOH: Denies  Illicit Drugs: Denies  ROS: Complete and normal except as mentioned above      PHYSICAL EXAM:   GENERAL:  No acute distress  HEENT:  NCAT, no scleral icterus   CHEST:  no respiratory distress  HEART:  Regular rate and rhythm  ABDOMEN:  Soft, non-tender, non-distended, normoactive bowel sounds. Large thrombosed extenal hemorrids with overlying clot.   EXTREMITIES: No edema  NEURO:  Alert and oriented x 3    Vital Signs:  Vital Signs Last 24 Hrs  T(C): 36.4 (25 Nov 2022 11:00), Max: 36.7 (25 Nov 2022 06:50)  T(F): 97.5 (25 Nov 2022 11:00), Max: 98 (25 Nov 2022 06:50)  HR: 75 (25 Nov 2022 11:00) (69 - 75)  BP: 123/57 (25 Nov 2022 11:00) (110/67 - 150/57)  BP(mean): --  RR: 16 (25 Nov 2022 11:00) (16 - 20)  SpO2: 100% (25 Nov 2022 11:00) (98% - 100%)    Parameters below as of 25 Nov 2022 11:00  Patient On (Oxygen Delivery Method): room air      LABS:                        10.1   16.51 )-----------( 181      ( 25 Nov 2022 01:30 )             31.3     Mean Cell Volume: 89.7 fL (11-25-22 @ 01:30)    11-25    140  |  101  |  18  ----------------------------<  102<H>  3.7   |  25  |  0.75    Ca    9.2      25 Nov 2022 10:58  Phos  3.5     11-25  Mg     1.70     11-25    TPro  7.6  /  Alb  3.7  /  TBili  0.4  /  DBili  x   /  AST  35<H>  /  ALT  10  /  AlkPhos  84  11-24    LIVER FUNCTIONS - ( 24 Nov 2022 22:14 )  Alb: 3.7 g/dL / Pro: 7.6 g/dL / ALK PHOS: 84 U/L / ALT: 10 U/L / AST: 35 U/L / GGT: x           PT/INR - ( 25 Nov 2022 10:58 )   PT: 25.9 sec;   INR: 2.21 ratio    PT - ( 24 Nov 2022 22:14 )  PTT:38.9 sec       Lipase serum16                                 10.1   16.51 )-----------( 181      ( 25 Nov 2022 01:30 )             31.3                         11.6   17.01 )-----------( 216      ( 24 Nov 2022 22:14 )             36.4       Imaging:  CT AP 11/25/2022  Circumferential wall thickening in the descending colon, sigmoid colon   and rectum with mucosal hyperemia, compatible with proctocolitis.  No   evidence of active contrast extravasation into the lumen of the   gastrointestinal tract at the time of CTA.    There is a 12 x 7 mm ovoid hypoattenuating focus in the wall of the   anorectal region on the left side, with mild peripheral enhancement,   possibly a thrombosed hemorrhoid.  Correlate with physical exams findings.

## 2022-11-25 NOTE — H&P ADULT - PROBLEM SELECTOR PLAN 2
-Likely 2/2 constipation. s/p 1x unasyn   -Will continue to monitor -Likely 2/2 constipation. s/p 1x unasyn   -Will start pt on zosyn   -Will continue to monitor

## 2022-11-25 NOTE — CONSULT NOTE ADULT - ATTENDING COMMENTS
History/PE as above. Patient seen/examined. Daughter at bedside who provided [place]ian translation. Daughter brought mother to hospital yesterday after episode of painless hematochezia. Questionable history regarding colon cancer unclear-apparently had colonoscopy 2015 at which time a 3 cm polyp was removed by EMR. Reviewed colonoscopy photo but pathology report not available.    Daughter indicates mother prone to constipation. Yesterday observed bright red blood without any report of fever, nausea, vomiting or abdominal pain.    Patient is thin, frail appearing and chronically ill-appearing but otherwise comfortable/NAD/nontoxic-appearing. Abdomen-healed midline and right upper quadrant oblique scars noted from prior perforated appendicitis and cholecystectomy. VALE-external hemorrhoid noted but nontender at current time. No mass. Maroon stool noted on examining finger.    Laboratory assessment and imaging as noted-leukocytosis noted with WBC 17,010 and hemoglobin 11.6. Followup hemoglobin 10.1. CT noted for circumferential wall thickening descending colon, sigmoid colon and rectum with mucosal hyperemia suggestive of proctocolitis.    Etiology of hematochezia possibly attributed to proctocolitis-in view of the dominant distribution and sigmoid colon and descending colon possibility of ischemic colitis considered. No associated diarrhea and less suggestive of infectious etiology but possible. Question as to neoplastic etiology also considered in view of prior history.    REC:  -Serial hemoglobin/hematocrit. Transfuse to maintain hemoglobin >8 g.  -If diarrhea recurs stool studies for GI PCR, stool culture and Clostridium difficile PCR.  -Avoid aspirin/NSAIDs.  -If feasible, daughter to obtain prior records so as to clarify question as to prior diagnosis of colon cancer.  -Clear liquids for now.  -Further diagnostic assessment discussed with daughter and a plan for colonoscopy on 11/28. Daughter wishes to discuss with other family members extent to which they want invasive testing performed in view of patient's age and frailty.
Date of service 11/25    Agree with resident note above    87F with Hx above including AFib on Xarelto, Rectal mass s/p EMR resection in 2015, pathology was reportedly stage 1 cancer per daughter, but no additional treatment was needed. She was followed and recommended for repeat colonoscopy however the patient did not want additional endoscopic evaluation. She presents with 2d of hematochezia. Has not had any issues since her rectal mass was excised. Surgery called due to hematochezia and CT finding of 1.2 x 0.7 cm enhancing area on L anorectal area    Labs reviewed: Hb 10.1 from 11.6  CT Imaging reviewed - + proctocolitis, no contrast extrav, 1.2 x 0.7 cm enhancing area on L anorectal area    Exam:  Abdomen soft NT/ND  VALE: + maroon stool on exam; L lateral external hemorrhoid, not thrombosed, no stigmata of bleeding, nontender; VALE without mass appreciated, + blood on VALE    Continue to hold Xarelto in the setting of LGIB  Would involve GI for repeat colonoscopy/flexible sigmoidoscopy if the patient has a possible recurrent mass causing bleeding  Stool studies for proctocolitis   If continued bleeding, can involve IR  Will follow along

## 2022-11-25 NOTE — H&P ADULT - ATTENDING COMMENTS
87F PMH colon cancer s/p resection (no treatment), afib on xarelto, HTN, HLD presenting with diarrhea, questionable blood in stool. CT suggestive of proctocolitis, which will be treated with zosyn, follow stool culture; fobt sent, continue to trend Hb (thus far, appears stable. Xarelto held pending repeat Hb).  Distal left leg maybe forming early cellulitis, duplex ordered 87F PMH colon cancer s/p resection (no treatment), afib on xarelto, HTN, HLD presenting with diarrhea, questionable blood in stool. CT suggestive of proctocolitis, which will be treated with zosyn, follow stool culture; fobt sent, continue to trend Hb (thus far, appears stable. Xarelto held pending repeat Hb).  Distal left leg maybe forming early cellulitis, duplex ordered. gi eval pending, colorectal surgery to be called for suspected thrombosed hemorrhoid

## 2022-11-25 NOTE — H&P ADULT - NSHPREVIEWOFSYSTEMS_GEN_ALL_CORE
REVIEW OF SYSTEMS:    CONSTITUTIONAL: No weakness, fevers or chills  EYES:  No visual changes, no eye pain   ENT: No vertigo or throat pain   NECK: No pain or stiffness  RESPIRATORY: No cough, wheezing, hemoptysis; No shortness of breath  CARDIOVASCULAR: No chest pain or palpitations  GASTROINTESTINAL: No abdominal or epigastric pain. No nausea, vomiting, or hematemesis; No diarrhea or constipation. + maroon stool  GENITOURINARY: No dysuria, frequency or hematuria  NEUROLOGICAL: No numbness or weakness  SKIN: No itching, rashes  Psych: no anxiety or depression

## 2022-11-25 NOTE — CONSULT NOTE ADULT - ASSESSMENT
87F with hx of rectal cancer s/p polypectomy in 2015 with Dr. Zamudio, no repeat colonoscopy, a fib on xarelto, HTN, HLD p/w 2 episodes of melena. CT scan findings with concern of thombosed hemorrhoid. No thrombosed hemorrhoid appreciated on VALE. Patient asymptomatic.     Plan:  - No acute surgical intervention  - Would hold xarelto in setting of GIB   - Given history of rectal cancer and GIB, recommend GI consult for colonoscopy  - Rest of care per primary team     Patient discussed with Dr. Kanwal Staples PGY-2   A Team Surgery  [id number]  87F with hx of rectal cancer s/p polypectomy in 2015 with Dr. Zamudio, no repeat colonoscopy, a fib on xarelto, HTN, HLD p/w 2 episodes of melena. CT scan findings with concern of thombosed hemorrhoid. No thrombosed hemorrhoid appreciated on VALE. Patient asymptomatic.     Plan:  - No acute surgical intervention  - Would hold xarelto in setting of GIB   - Given history of rectal cancer and GIB, recommend GI consult for colonoscopy  - Proctocolitis seen on CT scan, recommend stool studies for infectious colitis workup   - Rest of care per primary team     Patient discussed with Dr. Kanwal Staples PGY-2   A Team Surgery  [phone]

## 2022-11-25 NOTE — H&P ADULT - NSHPPOAPULMEMBOLUS_GEN_A_CORE
Recent CVA 2 weeks ago; ?new baseline  - Presents with somnolence x 1 day  - Afebrile, no leukocytosis, AST/ALT/ammonia WNL, lactic 2.2  - CT head with no acute intracranial abnormality noted   - Possibly uremic - missed her last two HD treatments (plan for HD 3/12)  - Blood cultures NGTD  - Urine cx gram neg vernon lactose   - PT/OT consulted: SNF and need to establish HD chair  - High risk of fall / injury - utilize all safety measures and fall precautions   - Aspiration precautions  Stable  - SW/CM assisting with discharge planning    no

## 2022-11-25 NOTE — H&P ADULT - PROBLEM SELECTOR PLAN 1
-Pt w/ 2 episodes of maroon stools   -Rectal exam in the ED showed no hemorrhoids or fissures noted on external exam, no masses palpated, maroon stool present.   -CTA A/P showed  proctocolitis with no evidence of active contrast extravasation into the lumen of the   gastrointestinal tract. 12 x 7 mm ovoid hypoattenuating focus in the wall of the anorectal region on the left side, with mild peripheral enhancement, possibly a thrombosed hemorrhoid  -Hgb dropped in the ED from 11.6 -> 10.1 -Pt w/ 2 episodes of maroon stools   -Rectal exam in the ED showed no hemorrhoids or fissures noted on external exam, no masses palpated, maroon stool present.   -CTA A/P showed  proctocolitis with no evidence of active contrast extravasation into the lumen of the   gastrointestinal tract. 12 x 7 mm ovoid hypoattenuating focus in the wall of the anorectal region on the left side, with mild peripheral enhancement, possibly a thrombosed hemorrhoid  -Hgb dropped in the ED from 11.6 -> 10.1  -Will cont to monitor trend hgb, transfuse if <7  -Will consult GI in AM  -Will consult surgery for possibly thrombosed hemorrhoid -Pt w/ 2 episodes of maroon stools   -Rectal exam in the ED showed no hemorrhoids or fissures noted on external exam, no masses palpated, maroon stool present.   -CTA A/P showed  proctocolitis with no evidence of active contrast extravasation into the lumen of the   gastrointestinal tract. 12 x 7 mm ovoid hypoattenuating focus in the wall of the anorectal region on the left side, with mild peripheral enhancement, possibly a thrombosed hemorrhoid  -Hgb dropped in the ED from 11.6 -> 10.1  -Will cont to monitor trend hgb, transfuse if <7  -Will consult GI in AM  -Will consult surgery in AM for possibly thrombosed hemorrhoid

## 2022-11-25 NOTE — CONSULT NOTE ADULT - ASSESSMENT
Jessie Mcdonough is a 87F PMH colon cancer s/p resection (no treatment), afib on xarelto, HTN, HLD presenting with one day of hematochezia and found to have proctocolitis on imaging. GI consulted for further workup and management of the above.     #Proctocolitis   #External hemorrhoids    #Hematochezia   Presenting with acute onset hematochezia with albs norable for hgb of 11.6 -> 10.1 with  CTA showed 1.2 x 0.7 cm hypoattenuating focus in left anorectal region, ?thrombosed hemorrhoid and some underlying proctocolitis extending into the descending colon. Exam notable for large thrombosed external hemorrhoids which may account for ongoing hematochezia. Etiology of proctocolitis unclear at this time although ddx includes stercoral colltiis in the setting of underlying constipation vs. infectious vs ischemic colitis. Given hx of colon cancer (unclear what stage or what was done) reasonable to proceed with c-scope on Monday for further evaluation of the above.     Recommendations  -Diet as tolerated, CLD on Sunday for c-scope on Monday 11/28/2022  -Appreciate CRS recs re: external hemorrhoids  -Please check stool studies, GI PCR and C. diff testing    -Miralax BID PRN   -Trend CBC q 12 hours, transfuse for goal>7  -Maintain two large bore IV, active T&S      All recommendations are tentative until note is attested by attending.     Fermin Crooks, PGY-4  Gastroenterology/Hepatology Fellow  Available on [employer] Teams   [phone] (Long Range Pager)  [phone] (Short Range Pager [facility])    After 5pm, please contact the on-call GI fellow. [phone]

## 2022-11-25 NOTE — PATIENT PROFILE ADULT - FALL HARM RISK - HARM RISK INTERVENTIONS

## 2022-11-25 NOTE — PROGRESS NOTE ADULT - SUBJECTIVE AND OBJECTIVE BOX
[facility] Division of Hospital Medicine  Dann Elliott MD  Pager [phone]      Patient is a 87y old  Female who presents with a chief complaint of maroon stool (25 Nov 2022 12:00)      SUBJECTIVE / OVERNIGHT EVENTS: No abd pain    MEDICATIONS  (STANDING):  atorvastatin 10 milliGRAM(s) Oral at bedtime  furosemide    Tablet 20 milliGRAM(s) Oral daily  methimazole 5 milliGRAM(s) Oral daily  piperacillin/tazobactam IVPB. 3.375 Gram(s) IV Intermittent once  piperacillin/tazobactam IVPB.- 3.375 Gram(s) IV Intermittent once  piperacillin/tazobactam IVPB.. 3.375 Gram(s) IV Intermittent every 8 hours  sotalol. 120 milliGRAM(s) Oral <User Schedule>    MEDICATIONS  (PRN):      CAPILLARY BLOOD GLUCOSE        I&O's Summary      PHYSICAL EXAM:  Vital Signs Last 24 Hrs  T(C): 36.4 (25 Nov 2022 11:00), Max: 36.7 (25 Nov 2022 06:50)  T(F): 97.5 (25 Nov 2022 11:00), Max: 98 (25 Nov 2022 06:50)  HR: 75 (25 Nov 2022 11:00) (69 - 75)  BP: 123/57 (25 Nov 2022 11:00) (110/67 - 150/57)  BP(mean): --  RR: 16 (25 Nov 2022 11:00) (16 - 20)  SpO2: 100% (25 Nov 2022 11:00) (98% - 100%)    Parameters below as of 25 Nov 2022 11:00  Patient On (Oxygen Delivery Method): room air      CONSTITUTIONAL: NAD  EYES: conjunctiva and sclera clear  ENMT: mmm  NECK: Supple,  RESPIRATORY: Normal respiratory effort; lungs are clear to auscultation bilaterally  CARDIOVASCULAR: Regular rate and rhythm, + S1 and S2  ABDOMEN: Nontender to palpation, normoactive bowel sounds, no rebound/guarding  MUSCULOSKELETAL:  no clubbing or cyanosis of digits;   PSYCH: A+O x 2-3  NEUROLOGY: no gross deficits   SKIN: No rashes;     LABS:                        10.1   16.51 )-----------( 181      ( 25 Nov 2022 01:30 )             31.3     11-25    140  |  101  |  18  ----------------------------<  102<H>  3.7   |  25  |  0.75    Ca    9.2      25 Nov 2022 10:58  Phos  3.5     11-25  Mg     1.70     11-25    TPro  7.6  /  Alb  3.7  /  TBili  0.4  /  DBili  x   /  AST  35<H>  /  ALT  10  /  AlkPhos  84  11-24    PT/INR - ( 25 Nov 2022 10:58 )   PT: 25.9 sec;   INR: 2.21 ratio         PTT - ( 24 Nov 2022 22:14 )  PTT:38.9 sec

## 2022-11-25 NOTE — H&P ADULT - ASSESSMENT
87F PMH colon cancer s/p resection (no treatment), afib on xarelto, HTN, HLD presenting with maroon stool today.

## 2022-11-26 NOTE — PROGRESS NOTE ADULT - ASSESSMENT
Jessie Mcdonough is a 87F PMH questionable history regarding colon cancer unclear-apparently had colonoscopy 2015 at which time a 3 cm polyp was removed by EMR. Reviewed colonoscopy photo but pathology report not available. , afib on xarelto, HTN, HLD presenting with one day of hematochezia and found to have proctocolitis on imaging. GI consulted for further workup and management of the above.       #Proctocolitis   #External hemorrhoids    #Hematochezia   Presenting with acute onset hematochezia with albs norable for hgb of 11.6 -> 10.1 with  CTA showed 1.2 x 0.7 cm hypoattenuating focus in left anorectal region, ?thrombosed hemorrhoid and some underlying proctocolitis extending into the descending colon. E Etiology of proctocolitis unclear at this time although ddx includes stercoral colitis in the setting of underlying constipation vs. infectious vs ischemic colitis. Given ?hx of colon cancer possible neoplastic process as well. Family agreeable to proceed with c-scope on Monday for further evaluation of the above.     Recommendations  -Full liquid diet today, CLD on Sunday for c-scope on Monday 11/28/2022  -Follow up stool studies, GI PCR and C. diff testing    -Miralax BID PRN   -Hold home Apixaban   -Trend CBC q 12 hours, transfuse for goal>7  -Maintain two large bore IV, active T&S    All recommendations are tentative until note is attested by attending.     Fermin Crooks, PGY-4  Gastroenterology/Hepatology Fellow  Available on [employer] Teams   [phone] (Long Range Pager)  [phone] (Short Range Pager [facility])    After 5pm, please contact the on-call GI fellow. [phone]

## 2022-11-26 NOTE — PROGRESS NOTE ADULT - SUBJECTIVE AND OBJECTIVE BOX
[facility] Division of Hospital Medicine  Dann Elliott MD  Pager [phone]      Patient is a 87y old  Female who presents with a chief complaint of maroon stool (26 Nov 2022 07:27)      SUBJECTIVE / OVERNIGHT EVENTS: Translation provided by daughter at bedside per pt's request. No Abd pain or maroon stool    MEDICATIONS  (STANDING):  atorvastatin 10 milliGRAM(s) Oral at bedtime  furosemide    Tablet 20 milliGRAM(s) Oral daily  influenza  Vaccine (HIGH DOSE) 0.7 milliLiter(s) IntraMuscular once  methimazole 5 milliGRAM(s) Oral daily  piperacillin/tazobactam IVPB. 3.375 Gram(s) IV Intermittent once  piperacillin/tazobactam IVPB.. 3.375 Gram(s) IV Intermittent every 8 hours  sotalol. 120 milliGRAM(s) Oral <User Schedule>    MEDICATIONS  (PRN):      CAPILLARY BLOOD GLUCOSE        I&O's Summary      PHYSICAL EXAM:  Vital Signs Last 24 Hrs  T(C): 37.1 (26 Nov 2022 09:01), Max: 37.1 (26 Nov 2022 09:01)  T(F): 98.7 (26 Nov 2022 09:01), Max: 98.7 (26 Nov 2022 09:01)  HR: 76 (26 Nov 2022 09:01) (69 - 83)  BP: 136/63 (26 Nov 2022 09:01) (104/53 - 136/80)  BP(mean): --  RR: 18 (26 Nov 2022 09:01) (16 - 18)  SpO2: 100% (26 Nov 2022 09:01) (98% - 100%)    Parameters below as of 26 Nov 2022 09:01  Patient On (Oxygen Delivery Method): room air      CONSTITUTIONAL: NAD  EYES: conjunctiva and sclera clear  ENMT: mmm  NECK: Supple,  RESPIRATORY: Normal respiratory effort; lungs are clear to auscultation bilaterally  CARDIOVASCULAR: Regular rate and rhythm, + S1 and S2  ABDOMEN: Nontender to palpation, normoactive bowel sounds, no rebound/guarding  MUSCULOSKELETAL:  no clubbing or cyanosis of digits;   PSYCH: A+O x 3  NEUROLOGY: no gross deficits   SKIN: No rashes;     LABS:                        9.4    10.33 )-----------( 184      ( 26 Nov 2022 05:51 )             29.4     11-25    140  |  101  |  18  ----------------------------<  102<H>  3.7   |  25  |  0.75    Ca    9.2      25 Nov 2022 10:58  Phos  3.5     11-25  Mg     1.70     11-25    TPro  7.6  /  Alb  3.7  /  TBili  0.4  /  DBili  x   /  AST  35<H>  /  ALT  10  /  AlkPhos  84  11-24    PT/INR - ( 25 Nov 2022 10:58 )   PT: 25.9 sec;   INR: 2.21 ratio         PTT - ( 24 Nov 2022 22:14 )  PTT:38.9 sec

## 2022-11-26 NOTE — PROGRESS NOTE ADULT - SUBJECTIVE AND OBJECTIVE BOX
Gastroenterology Progress Note    Interval Events:   Feeling well today with no acute complaints. Denies any ongoing rectal pain with no further episodes of hematochezai.     Allergies:  No Known Allergies      Hospital Medications:  atorvastatin 10 milliGRAM(s) Oral at bedtime  furosemide    Tablet 20 milliGRAM(s) Oral daily  influenza  Vaccine (HIGH DOSE) 0.7 milliLiter(s) IntraMuscular once  methimazole 5 milliGRAM(s) Oral daily  piperacillin/tazobactam IVPB. 3.375 Gram(s) IV Intermittent once  piperacillin/tazobactam IVPB.. 3.375 Gram(s) IV Intermittent every 8 hours  sotalol. 120 milliGRAM(s) Oral <User Schedule>      ROS: 14 point ROS negative unless otherwise state in subjective    PHYSICAL EXAM:   Vital Signs:  Vital Signs Last 24 Hrs  T(C): 36.6 (25 Nov 2022 23:25), Max: 36.6 (25 Nov 2022 23:25)  T(F): 97.8 (25 Nov 2022 23:25), Max: 97.8 (25 Nov 2022 23:25)  HR: 76 (25 Nov 2022 23:25) (75 - 83)  BP: 136/80 (25 Nov 2022 23:25) (121/72 - 136/80)  BP(mean): --  RR: 17 (25 Nov 2022 23:25) (16 - 17)  SpO2: 100% (25 Nov 2022 23:25) (98% - 100%)    Parameters below as of 25 Nov 2022 23:25  Patient On (Oxygen Delivery Method): room air      GENERAL:  No acute distress  HEENT:  NCAT, no scleral icterus  CHEST: no resp distress  HEART:  RRR  ABDOMEN:  Soft, non-tender, non-distended, normoactive bowel sounds  EXTREMITIES:  No edema  NEURO:  Alert and oriented x 3    LABS:                        9.5    11.35 )-----------( 181      ( 25 Nov 2022 17:15 )             29.9     Mean Cell Volume: 89.3 fL (11-25-22 @ 17:15)    11-25    140  |  101  |  18  ----------------------------<  102<H>  3.7   |  25  |  0.75    Ca    9.2      25 Nov 2022 10:58  Phos  3.5     11-25  Mg     1.70     11-25    TPro  7.6  /  Alb  3.7  /  TBili  0.4  /  DBili  x   /  AST  35<H>  /  ALT  10  /  AlkPhos  84  11-24    LIVER FUNCTIONS - ( 24 Nov 2022 22:14 )  Alb: 3.7 g/dL / Pro: 7.6 g/dL / ALK PHOS: 84 U/L / ALT: 10 U/L / AST: 35 U/L / GGT: x           PT/INR - ( 25 Nov 2022 10:58 )   PT: 25.9 sec;   INR: 2.21 ratio    PTT - ( 24 Nov 2022 22:14 )  PTT:38.9 sec    Imaging:  Imaging:  CT AP 11/25/2022  Circumferential wall thickening in the descending colon, sigmoid colon   and rectum with mucosal hyperemia, compatible with proctocolitis.  No   evidence of active contrast extravasation into the lumen of the   gastrointestinal tract at the time of CTA.    There is a 12 x 7 mm ovoid hypoattenuating focus in the wall of the   anorectal region on the left side, with mild peripheral enhancement,   possibly a thrombosed hemorrhoid.  Correlate with physical exams findings.

## 2022-11-26 NOTE — PROGRESS NOTE ADULT - ASSESSMENT
87F PMH rectal cancer s/p polypectomy (2015), afib on xarelto, HTN, HLD p/w 2 episodes of melena. CT scan findings with concern of thombosed hemorrhoid. No thrombosed hemorrhoid appreciated on VALE. Patient asymptomatic.     Plan:  - No acute surgical intervention  - Hold xarelto 2/2 GIB   - f/u stool studies for infectious colitis   - Rest of care per primary team     A team  [id number]

## 2022-11-26 NOTE — PROGRESS NOTE ADULT - SUBJECTIVE AND OBJECTIVE BOX
SURGERY DAILY PROGRESS NOTE    --------------- OVERNIGHT/INTERVAL EVENTS ---------------  - No acute events overnight     --------------- SUBJECTIVE ---------------  - Patient describes no acute issues or concerns at this time   - Patient seen and examined at bedside with surgical team    --------------- OBJECTIVE ---------------    -----VITALS-----  T(C): 36.4, Max: 36.6 (11-25)  HR: 69 (69 - 83)  BP: 104/53 (104/53 - 136/80)  RR: 16 (16 - 17)  SpO2: 100% (98% - 100%) room air        -----PHYSICAL EXAM-----  GENERAL: NAD, lying in bed   NEURO: AOx3, awake alert appropriate  HEENT: NCAT, trachea midline  PULM: Respirations non-labored  ABD: Soft, non-tender, non-distended, no peritonitis/rebound tenderness  EXT: Warm, well perfused  Rectal: Non-thrombosed hemorrhoid  Sacrum: Sacral wound with mepilex    -----INs & OUTs-----      -----MEDICATIONS-----  STANDING  atorvastatin 10 milliGRAM(s) Oral at bedtime  furosemide    Tablet 20 milliGRAM(s) Oral daily  influenza  Vaccine (HIGH DOSE) 0.7 milliLiter(s) IntraMuscular once  methimazole 5 milliGRAM(s) Oral daily  piperacillin/tazobactam IVPB. 3.375 Gram(s) IV Intermittent once  piperacillin/tazobactam IVPB.. 3.375 Gram(s) IV Intermittent every 8 hours  sotalol. 120 milliGRAM(s) Oral <User Schedule>    PRN      -----LABS-----  140  |  101  |  18  ----------------------------<  102<H>    (11-25)  3.7   |  25  |  0.75            Ca    9.2      11-25  Mg    1.70  Phos  3.5          PT: 25.9 sec     11-25  aPTT: x   INR: 2.21 ratio

## 2022-11-27 NOTE — PROGRESS NOTE ADULT - SUBJECTIVE AND OBJECTIVE BOX
[facility] Division of Hospital Medicine  Dann Elliott MD  Pager [phone]      Patient is a 87y old  Female who presents with a chief complaint of maroon stool (27 Nov 2022 08:42)      SUBJECTIVE / OVERNIGHT EVENTS: Limited historian, denies abd pain    MEDICATIONS  (STANDING):  atorvastatin 10 milliGRAM(s) Oral at bedtime  furosemide    Tablet 20 milliGRAM(s) Oral daily  influenza  Vaccine (HIGH DOSE) 0.7 milliLiter(s) IntraMuscular once  methimazole 5 milliGRAM(s) Oral daily  piperacillin/tazobactam IVPB. 3.375 Gram(s) IV Intermittent once  piperacillin/tazobactam IVPB.. 3.375 Gram(s) IV Intermittent every 8 hours  potassium chloride   Powder 40 milliEquivalent(s) Oral every 4 hours  sotalol. 120 milliGRAM(s) Oral <User Schedule>    MEDICATIONS  (PRN):  polyethylene glycol 3350 17 Gram(s) Oral two times a day PRN Constipation      CAPILLARY BLOOD GLUCOSE        I&O's Summary      PHYSICAL EXAM:  Vital Signs Last 24 Hrs  T(C): 36.5 (27 Nov 2022 08:30), Max: 37 (26 Nov 2022 17:01)  T(F): 97.7 (27 Nov 2022 08:30), Max: 98.6 (26 Nov 2022 17:01)  HR: 68 (27 Nov 2022 08:30) (68 - 98)  BP: 109/62 (27 Nov 2022 08:30) (109/62 - 138/60)  BP(mean): --  RR: 17 (27 Nov 2022 08:30) (17 - 18)  SpO2: 97% (27 Nov 2022 08:30) (97% - 99%)    Parameters below as of 27 Nov 2022 08:30  Patient On (Oxygen Delivery Method): room air      CONSTITUTIONAL: NAD  EYES: conjunctiva and sclera clear  ENMT: mmm  NECK: Supple,  RESPIRATORY: Normal respiratory effort;   CARDIOVASCULAR: Regular rate and rhythm, + S1 and S2  ABDOMEN: Nontender to palpation, normoactive bowel sounds, no rebound/guarding  MUSCULOSKELETAL:  no clubbing or cyanosis of digits;   PSYCH: A+O x 2 9 Self and place)  NEUROLOGY: no gross deficits   SKIN: No rashes;     LABS:                        9.4    9.93  )-----------( 165      ( 27 Nov 2022 06:39 )             29.5     11-27    139  |  99  |  8   ----------------------------<  92  2.3<LL>   |  30  |  0.76    Ca    8.2<L>      27 Nov 2022 06:54  Phos  2.6     11-27  Mg     1.50     11-27

## 2022-11-27 NOTE — PROGRESS NOTE ADULT - SUBJECTIVE AND OBJECTIVE BOX
Gastroenterology  Progress Note    Interval Events:   Hgb overall stable with no further signs of bleeding. Denies any ongong n/v/abdominal pain.     Allergies:  No Known Allergies      Hospital Medications:  atorvastatin 10 milliGRAM(s) Oral at bedtime  furosemide    Tablet 20 milliGRAM(s) Oral daily  influenza  Vaccine (HIGH DOSE) 0.7 milliLiter(s) IntraMuscular once  methimazole 5 milliGRAM(s) Oral daily  piperacillin/tazobactam IVPB. 3.375 Gram(s) IV Intermittent once  piperacillin/tazobactam IVPB.. 3.375 Gram(s) IV Intermittent every 8 hours  polyethylene glycol 3350 17 Gram(s) Oral two times a day PRN  sotalol. 120 milliGRAM(s) Oral <User Schedule>      ROS: 14 point ROS negative unless otherwise state in subjective    PHYSICAL EXAM:   Vital Signs:  Vital Signs Last 24 Hrs  T(C): 36.4 (27 Nov 2022 05:55), Max: 37.1 (26 Nov 2022 09:01)  T(F): 97.5 (27 Nov 2022 05:55), Max: 98.7 (26 Nov 2022 09:01)  HR: 72 (27 Nov 2022 05:55) (70 - 98)  BP: 115/54 (27 Nov 2022 05:55) (115/54 - 138/60)  BP(mean): --  RR: 17 (27 Nov 2022 05:55) (17 - 18)  SpO2: 98% (27 Nov 2022 05:55) (98% - 100%)    Parameters below as of 27 Nov 2022 05:55  Patient On (Oxygen Delivery Method): room air    GENERAL:  No acute distress  HEENT:  NCAT, no scleral icterus  CHEST: no resp distress  HEART:  RRR  ABDOMEN:  Soft, non-tender, non-distended, normoactive bowel sound  NEURO:  Alert and oriented x 3    LABS:                        9.4    9.93  )-----------( 165      ( 27 Nov 2022 06:39 )             29.5     Mean Cell Volume: 90.2 fL (11-27-22 @ 06:39)    11-25    140  |  101  |  18  ----------------------------<  102<H>  3.7   |  25  |  0.75    Ca    9.2      25 Nov 2022 10:58  Phos  3.5     11-25  Mg     1.70     11-25  PT/INR - ( 25 Nov 2022 10:58 )   PT: 25.9 sec;   INR: 2.21 ratio      Imaging:  CT AP 11/25/2022  Circumferential wall thickening in the descending colon, sigmoid colon   and rectum with mucosal hyperemia, compatible with proctocolitis.  No   evidence of active contrast extravasation into the lumen of the   gastrointestinal tract at the time of CTA.    There is a 12 x 7 mm ovoid hypoattenuating focus in the wall of the   anorectal region on the left side, with mild peripheral enhancement,   possibly a thrombosed hemorrhoid.  Correlate with physical exams findings.

## 2022-11-27 NOTE — PHYSICAL THERAPY INITIAL EVALUATION ADULT - GAIT DEVIATIONS NOTED, PT EVAL
decreased dung/increased time in double stance/decreased velocity of limb motion/decreased step length/decreased weight-shifting ability

## 2022-11-27 NOTE — PROGRESS NOTE ADULT - ASSESSMENT
87F PMH rectal cancer s/p polypectomy (2015), afib on xarelto, HTN, HLD p/w 2 episodes of melena. CT scan findings with concern of thombosed hemorrhoid. No thrombosed hemorrhoid appreciated on VALE. Patient asymptomatic.     Plan:  - No acute surgical intervention  - f/u C scope tomorrow results  - Trend H&H  - Hold xarelto 2/2 GIB   - f/u stool studies for infectious colitis   - Rest of care per primary team     A team  [id number]

## 2022-11-27 NOTE — PHYSICAL THERAPY INITIAL EVALUATION ADULT - GENERAL OBSERVATIONS, REHAB EVAL
Patient received in semifowler position in bed in NAD daughter bedside to translate. Patient denies chest pain, SOB, headache, and dizziness.

## 2022-11-27 NOTE — DIETITIAN INITIAL EVALUATION ADULT - OTHER INFO
How Severe Are Your Warts?: mild Is This A New Presentation, Or A Follow-Up?: Wart Additional History: Pt cut off wart at home with razor blade, wart is back. 87 year old with PMH colon cancer s/p resection (no treatment), afib on xarelto, HTN, HLD presenting with maroon stool today.     #[id number] used for Georgian translation, however patient confused at time of visit - unable to obtain nutrition history. No height or weight documented in chart. RD obtained bedscale weight of 45 kg (99 lbs) - appears close to accurate. As per GI note, patient has chronic constipation, she typically has a bowel movement qod, but recently has become more constipated than baseline. NKFA.     Patient has been on clear liquid diet since admission (11/25)- remains on clear liquids at this time to prep for test tomorrow.  As per RN flowsheet, patient consuming 51-75% of trays. As per RN, patient does better with encouragement from family members. No documented complaints of N/V/D/C at this time.  No documented concerns of chewing or swallowing difficulties at this time.

## 2022-11-27 NOTE — PROGRESS NOTE ADULT - ASSESSMENT
Jessie Mcdonough is a 87F PMH questionable history regarding colon cancer unclear-apparently had colonoscopy 2015 at which time a 3 cm polyp was removed by EMR. Reviewed colonoscopy photo but pathology report not available. , afib on xarelto, HTN, HLD presenting with one day of hematochezia and found to have proctocolitis on imaging. GI consulted for further workup and management of the above.     #Proctocolitis   #External hemorrhoids    #Hematochezia   Presenting with acute onset hematochezia with albs norable for hgb of 11.6 -> 10.1 with  CTA showed 1.2 x 0.7 cm hypoattenuating focus in left anorectal region, ?thrombosed hemorrhoid and some underlying proctocolitis extending into the descending colon. E Etiology of proctocolitis unclear at this time although ddx includes stercoral colitis in the setting of underlying constipation vs. infectious vs ischemic colitis. Given ?hx of colon cancer possible neoplastic process as well. Family agreeable to proceed with c-scope on Monday for further evaluation of the above.     Recommendations  -CLD today, NPO after MN with plan for c-scope on Monday 11/28/2022  -Plese check C-19 swab  -Follow up stool studies, GI PCR and C. diff testing    -Miralax BID PRN   -Hold home Apixaban   -Trend CBC q 12 hours, transfuse for goal>7  -Maintain two large bore IV, active T&S    Instructions for colonoscopy  - Please ensure golytely is completed (to be ordered by GI fellow)  - Please ensure patient drinks entire prep  - Please ensure morning labs are drawn at 2am, electrolytes repleted as necessary, and Hg>7    All recommendations are tentative until note is attested by attending.     Fermin Crooks, PGY-4  Gastroenterology/Hepatology Fellow  Available on [employer] Teams   [phone] (Long Range Pager)  [phone] (Short Range Pager [facility])    After 5pm, please contact the on-call GI fellow. [phone]   Jessie Mcdonough is a 87F PMH questionable history regarding colon cancer unclear-apparently had colonoscopy 2015 at which time a 3 cm polyp was removed by EMR. Reviewed colonoscopy photo but pathology report not available. , afib on xarelto, HTN, HLD presenting with one day of hematochezia and found to have proctocolitis on imaging. GI consulted for further workup and management of the above.     #Proctocolitis   #External hemorrhoids    #Hematochezia   Presenting with acute onset hematochezia with albs norable for hgb of 11.6 -> 10.1 with  CTA showed 1.2 x 0.7 cm hypoattenuating focus in left anorectal region, ?thrombosed hemorrhoid and some underlying proctocolitis extending into the descending colon. E Etiology of proctocolitis unclear at this time although ddx includes stercoral colitis in the setting of underlying constipation vs. infectious vs ischemic colitis. Given ?hx of colon cancer possible neoplastic process as well. Family agreeable to proceed with c-scope on Monday for further evaluation of the above.     Recommendations  -CLD today, NPO after MN with plan for c-scope on Monday 11/28/2022  -Please recheck INR today and tomorrow   -Please check C-19 swab    -Follow up stool studies, GI PCR and C. diff testing    -Miralax BID PRN   -Hold home Apixaban   -Trend CBC q 12 hours, transfuse for goal>7  -Maintain two large bore IV, active T&S    Instructions for colonoscopy  - Please ensure golytely is completed (to be ordered by GI fellow)  - Please ensure patient drinks entire prep  - Please ensure morning labs are drawn at 2am, electrolytes repleted as necessary, and Hg>7    All recommendations are tentative until note is attested by attending.     Fermin Crooks, PGY-4  Gastroenterology/Hepatology Fellow  Available on [employer] Teams   [phone] (Long Range Pager)  [phone] (Short Range Pager [facility])    After 5pm, please contact the on-call GI fellow. [phone]

## 2022-11-27 NOTE — DIETITIAN INITIAL EVALUATION ADULT - ETIOLOGY
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

## 2022-11-27 NOTE — DIETITIAN INITIAL EVALUATION ADULT - CALCULATED TO (G/KG)
Pt arrives to er with c/o numbness to her whole body since Saturday, pain in l left arm. britton richey, states that she has intermittent dizziness and ext weakness as well
71.84

## 2022-11-27 NOTE — DIETITIAN INITIAL EVALUATION ADULT - ADD RECOMMEND
1) Advance diet as medically feasible   2) Recommend Ensure Clear TID (provides 240 kcal, 8 gm protein per 8oz serving) to optimize PO intake and promote wound healing.   3) When diet advanced, consider Ensure Plus HP TID (provides 350 kcal, 20 gm protein per 8oz serving) to optimize PO intake and promote wound healing.   3) Recommend MVI daily and vitamin C (500mg) BID to promote wound healing and provide micronutrient coverage.  4) Monitor weights, labs, PO intake.   5) RD to follow up per protocol.

## 2022-11-27 NOTE — DIETITIAN INITIAL EVALUATION ADULT - PERTINENT MEDS FT
MEDICATIONS  (STANDING):  atorvastatin 10 milliGRAM(s) Oral at bedtime  furosemide    Tablet 20 milliGRAM(s) Oral daily  influenza  Vaccine (HIGH DOSE) 0.7 milliLiter(s) IntraMuscular once  methimazole 5 milliGRAM(s) Oral daily  piperacillin/tazobactam IVPB. 3.375 Gram(s) IV Intermittent once  piperacillin/tazobactam IVPB.. 3.375 Gram(s) IV Intermittent every 8 hours  potassium chloride   Powder 40 milliEquivalent(s) Oral every 4 hours  sotalol. 120 milliGRAM(s) Oral <User Schedule>    MEDICATIONS  (PRN):  polyethylene glycol 3350 17 Gram(s) Oral two times a day PRN Constipation

## 2022-11-27 NOTE — DIETITIAN INITIAL EVALUATION ADULT - NS FNS DIET ORDER
Diet, NPO after Midnight:      NPO Start Date: 27-Nov-2022,   NPO Start Time: 23:59  Except Medications (11-27-22 @ 08:18)

## 2022-11-28 NOTE — PROVIDER CONTACT NOTE (CRITICAL VALUE NOTIFICATION) - ACTION/TREATMENT ORDERED:
Potassium IVPB ordered x3. Endorsed to day shift to follow up with labs once completed.
acp aware  awaiting order

## 2022-11-28 NOTE — CONSULT NOTE ADULT - SUBJECTIVE AND OBJECTIVE BOX
Wound SURGERY CONSULT NOTE    HPI:  87F PMH colon cancer s/p resection (no treatment), afib on xarelto, HTN, HLD presenting with maroon stool today. Per daughter, pt had 2 episodes of maroon stool in yesterday. She has not had any episodes today. Per pt she has never had any episodes like this in the past. Pt denies any abdominal pain, chest pain, SOB, N/V, dizziness, hematuria, fever, chills. Denies any change in diet or medications.     In the ED, pt afebrile, HR 72, /67 and saturating well on RA. Given 1x unasyn, duonebs and tylenol. Hgb dropped from 11.6 -> 10.1 (25 Nov 2022 04:52)    Wound consult requested to assist w/ management of sacral stage 3 pressure injury. Per daughter at bedside patient lives at home with her daughter, confused at times but long term memory is intact. Patient prefers to lay in bed, but can ambulate with walker. Per daughter she has a private HHA from 9am-2pm daily. She is aware of the wound on her backside, usually kept open to air. Per daughter patient wearing diapers at home. Notices maroon colored stool day prior to admission. Per daughter patient has had adequate appetite, denies sob, cp, n/v, pain to wound of her sacrum. Denies drainage, odor from wound.    Current Diet: Diet, Clear Liquid:   Supplement Feeding Modality:  Oral  Ensure Clear Cans or Servings Per Day:  1       Frequency:  Three Times a day (11-27-22 @ 14:10)      PAST MEDICAL & SURGICAL HISTORY:  Atrial fibrillation      History of cholecystectomy      REVIEW OF SYSTEMS: general, skin see above.  All other systems negative.    MEDICATIONS  (STANDING):  ascorbic acid 500 milliGRAM(s) Oral two times a day  atorvastatin 10 milliGRAM(s) Oral at bedtime  furosemide    Tablet 20 milliGRAM(s) Oral daily  influenza  Vaccine (HIGH DOSE) 0.7 milliLiter(s) IntraMuscular once  methimazole 5 milliGRAM(s) Oral daily  multivitamin 1 Tablet(s) Oral daily  piperacillin/tazobactam IVPB. 3.375 Gram(s) IV Intermittent once  piperacillin/tazobactam IVPB.. 3.375 Gram(s) IV Intermittent every 8 hours  potassium chloride    Tablet ER 40 milliEquivalent(s) Oral once  sotalol. 120 milliGRAM(s) Oral <User Schedule>    MEDICATIONS  (PRN):  polyethylene glycol 3350 17 Gram(s) Oral two times a day PRN Constipation      Allergies    No Known Allergies    Intolerances        SOCIAL HISTORY:  Lives with daughter. HHA 5 hours a day. Ambulates with walker. Increasingly sedentary. Per records on smoking, etoh, illicit drug use.  Contact precautions for r/o Cdiff.    FAMILY HISTORY:      PHYSICAL EXAM:  Vital Signs Last 24 Hrs  T(C): 36.5 (28 Nov 2022 17:11), Max: 36.7 (28 Nov 2022 05:14)  T(F): 97.7 (28 Nov 2022 17:11), Max: 98 (28 Nov 2022 05:14)  HR: 65 (28 Nov 2022 17:11) (60 - 92)  BP: 115/57 (28 Nov 2022 17:11) (93/70 - 123/81)  BP(mean): --  RR: 16 (28 Nov 2022 17:11) (16 - 27)  SpO2: 95% (28 Nov 2022 17:11) (95% - 100%)    Parameters below as of 28 Nov 2022 17:11  Patient On (Oxygen Delivery Method): room air    Wt: 44.1 kg (11-28-22)    Constitutional: NAD,  A&Ox3, frail, well groomed  (+) low airloss support surface, (+) fluidized positioning devices, (+) complete cair boots  HEENT:  NC/AT,  mucosa moist,  Cardiovascular: rate regular  Respiratory: room air, equal chest expansion, non labored  Gastrointestinal: soft NT/ND, incontinent of loose mucoid stool; no melena or BRBPR noted  : incontinent urine, indwelling burns catheter in place.  Musculoskeletal: no deformities/ contractures  Vascular: BLE equally warm, trace edema, +2 dp pulses, capillar refill < 3 seconds.  Skin:  moist, frail  Sacral-gluteal cleft- stage 4 pressure injury- patient turned to right side- 1.8cmx0.5cmx0.8cm undermining from 8-10 o'clock extending 0.8cm. Wound base 100% pink-granular, bone in close proximity, no palpable, no visible. Small-moderate serosanguinous drainage, no odor. No associated cellulitis. Aquacel/foam applied.    LABS/ CULTURES/ RADIOLOGY:                        9.5    9.81  )-----------( 129      ( 28 Nov 2022 03:25 )             30.0       139  |  102  |  5   ----------------------------<  105      [11-28-22 @ 21:18]  3.4   |  26  |  0.62        Ca     7.9     [11-28-22 @ 21:18]      Mg     1.60     [11-28-22 @ 11:31]      Phos  2.1     [11-28-22 @ 11:31]          < from: CT Angio Abdomen and Pelvis w/ IV Cont (11.27.22 @ 18:06) >  ACC: [id number] EXAM:  CT ANGIO ABD PELV (W)AW IC                          PROCEDURE DATE:  11/24/2022          INTERPRETATION:  CLINICAL INFORMATION: Abdominal pain.  Rectal bleeding.    COMPARISON: None.    CONTRAST/COMPLICATIONS:  IV Contrast: Omnipaque 350  90 cc administered   10 cc discarded  Oral Contrast: NONE  Complications: None reported at time of study completion    PROCEDURE:  GI bleeding protocol.  CT of the Abdomen and Pelvis was performed.  Precontrast, Arterial and Delayed phaseswere performed.  Sagittal and coronal reformats were performed.    FINDINGS:  LOWER CHEST: Bibasilar subsegmental atelectasis.  Calcified granulomas in   the right middle lobe and right lower lobe.  Cardiomegaly.  Heavy   atherosclerotic calcification of the visualized coronary arteries.    LIVER: Punctate parenchymal calcification, likely a granuloma.  BILE DUCTS: Normal caliber.  GALLBLADDER: Not visualized.  SPLEEN: Within normal limits.  PANCREAS: Atrophic.  ADRENALS: Within normal limits.  KIDNEYS/URETERS: Within normal limits.    BLADDER: Within normal limits.  REPRODUCTIVE ORGANS: Uterus and adnexa appear within normal limits.    BOWEL: Small hiatal hernia.  No bowel obstruction.  Appendix not   definitively visualized; no secondary signs of appendicitis.  There is   circumferential wall thickening in the descending colon, sigmoid colon   and rectum with mucosal hyperemia.  Minimal diverticulosis in the distal   descending colon without evidence for acute diverticulitis.  No evidence   of active contrast extravasation into the lumen gastrointestinal tract on   arterial or venous phase images.  There is a 12 x 7 mm ovoid   hypoattenuating focus in the wall of the anorectal region on the left   side with mild peripheral enhancement (5:97).  PERITONEUM: No ascites, free air or abscess.  VESSELS: Atherosclerotic calcifications of the aortoiliac tree and   proximal thigh vasculature.  RETROPERITONEUM/LYMPH NODES: No lymphadenopathy.  ABDOMINAL WALL: There is a left periumbilicalventral abdominal wall   hernia containing short segment nonobstructed loop of transverse colon.    There is an infraumbilical ventral abdominal wall hernia containing a   short segment nonobstructed small bowel loop.  BONES: Bones are osteopenic.  Degenerative changes in the spine and hips.    No acute fracture or suspicious osseous lesion.    IMPRESSION:  Circumferential wall thickening in the descending colon, sigmoid colon   and rectum with mucosal hyperemia, compatible with proctocolitis.  No   evidence of active contrast extravasation into the lumen of the   gastrointestinal tract at the time of CTA.    There is a 12 x 7 mm ovoid hypoattenuating focus in the wall of the   anorectal region on the left side, with mild peripheral enhancement,   possibly a thrombosed hemorrhoid.  Correlate with physical exams findings.    --- End of Report ---            JOSHUA JACKSON MD; Attending Radiologist  This document has been electronically signed. Nov 24 2022 1    < end of copied text >

## 2022-11-28 NOTE — PROVIDER CONTACT NOTE (OTHER) - ASSESSMENT
Daughter present at bedside and both daughter and patient explained that the full prep must be completed prior to midnight. Patient encouraged multiple times to continue drinking. Patient would take a small sip and then stop despite multiple attempts and encouragement from nurse.

## 2022-11-28 NOTE — PROGRESS NOTE ADULT - ASSESSMENT
87F PMH colon cancer s/p resection (no treatment), afib on xarelto, HTN, HLD presenting with maroon stool c/f GIB.

## 2022-11-28 NOTE — PROGRESS NOTE ADULT - SUBJECTIVE AND OBJECTIVE BOX
HPI:        PAST MEDICAL & SURGICAL HISTORY:  Atrial fibrillation      History of cholecystectomy          MEDICATIONS  (STANDING):  ascorbic acid 500 milliGRAM(s) Oral two times a day  atorvastatin 10 milliGRAM(s) Oral at bedtime  furosemide    Tablet 20 milliGRAM(s) Oral daily  influenza  Vaccine (HIGH DOSE) 0.7 milliLiter(s) IntraMuscular once  methimazole 5 milliGRAM(s) Oral daily  multivitamin 1 Tablet(s) Oral daily  piperacillin/tazobactam IVPB. 3.375 Gram(s) IV Intermittent once  piperacillin/tazobactam IVPB.. 3.375 Gram(s) IV Intermittent every 8 hours  potassium chloride  10 mEq/100 mL IVPB 10 milliEquivalent(s) IV Intermittent every 1 hour  sotalol. 120 milliGRAM(s) Oral <User Schedule>    MEDICATIONS  (PRN):  polyethylene glycol 3350 17 Gram(s) Oral two times a day PRN Constipation      Allergies    No Known Allergies    Intolerances        SOCIAL HISTORY:    FAMILY HISTORY:          Physical Exam:  General: NAD, resting comfortably  HEENT: NC/AT, EOMI, normal hearing, no oral lesions, no LAD, neck supple  Pulmonary: normal resp effort, CTA-B  Cardiovascular: NSR, no murmurs  Abdominal: soft, ND/NT, no organomegaly  Extremities: WWP, normal strength, no clubbing/cyanosis/edema  Neuro: A/O x 3, CNs II-XII grossly intact, normal sensation, no focal deficits  Pulses: palpable distal pulses    Vital Signs Last 24 Hrs  T(C): 36.7 (28 Nov 2022 05:14), Max: 36.7 (27 Nov 2022 16:14)  T(F): 98 (28 Nov 2022 05:14), Max: 98.1 (27 Nov 2022 22:40)  HR: 72 (28 Nov 2022 05:14) (68 - 73)  BP: 120/62 (28 Nov 2022 05:14) (109/62 - 121/72)  BP(mean): --  RR: 17 (28 Nov 2022 05:14) (17 - 18)  SpO2: 98% (28 Nov 2022 05:14) (97% - 98%)    Parameters below as of 28 Nov 2022 05:14  Patient On (Oxygen Delivery Method): room air        I&O's Summary          LABS:                        9.5    9.81  )-----------( 129      ( 28 Nov 2022 03:25 )             30.0     11-28    143  |  103  |  6<L>  ----------------------------<  96  2.6<LL>   |  27  |  0.67    Ca    7.8<L>      28 Nov 2022 03:25  Phos  2.0     11-28  Mg     1.80     11-28          CAPILLARY BLOOD GLUCOSE            Cultures:      RADIOLOGY & ADDITIONAL STUDIES:      Plan:           SURGERY DAILY PROGRESS NOTE    SUBJECTIVE/ROS: No acute events overnight. Patient seen and examined bedside on AM rounds.  Potassium 2.6, Phos 2.0. H&H stable.     OBJECTIVE:  Vital Signs Last 24 Hrs  T(C): 36.7 (28 Nov 2022 05:14), Max: 36.7 (27 Nov 2022 16:14)  T(F): 98 (28 Nov 2022 05:14), Max: 98.1 (27 Nov 2022 22:40)  HR: 72 (28 Nov 2022 05:14) (68 - 73)  BP: 120/62 (28 Nov 2022 05:14) (109/62 - 121/72)  BP(mean): --  RR: 17 (28 Nov 2022 05:14) (17 - 18)  SpO2: 98% (28 Nov 2022 05:14) (97% - 98%)    Parameters below as of 28 Nov 2022 05:14  Patient On (Oxygen Delivery Method): room air    PHYSICAL EXAM:  Constitutional: NAD, pt sleeping comfortably  Respiratory: non-labored breathing, patent airway  Extremities: warm  Neurological: confused                          9.5    9.81  )-----------( 129      ( 28 Nov 2022 03:25 )             30.0     11-28    143  |  103  |  6<L>  ----------------------------<  96  2.6<LL>   |  27  |  0.67    Ca    7.8<L>      28 Nov 2022 03:25  Phos  2.0     11-28  Mg     1.80     11-28

## 2022-11-28 NOTE — PROVIDER CONTACT NOTE (CRITICAL VALUE NOTIFICATION) - ASSESSMENT
On assessment, pt. is asymptomatic. Denies SOB and/or chest pain at this time. No distress noted or voiced.

## 2022-11-28 NOTE — PROGRESS NOTE ADULT - SUBJECTIVE AND OBJECTIVE BOX
[facility] Division of Hospital Medicine  Sandra Trotter MD  Pager #[phone]    Patient is a 87y old  Female who presents with a chief complaint of maroon stool (28 Nov 2022 07:02)    SUBJECTIVE / OVERNIGHT EVENTS: No acute events. Awaiting GI scope. Denies nausea, vomiting, chest pain, abdominal pain, further bleeding. Daughter at bedside.    MEDICATIONS  (STANDING):  ascorbic acid 500 milliGRAM(s) Oral two times a day  atorvastatin 10 milliGRAM(s) Oral at bedtime  furosemide    Tablet 20 milliGRAM(s) Oral daily  influenza  Vaccine (HIGH DOSE) 0.7 milliLiter(s) IntraMuscular once  methimazole 5 milliGRAM(s) Oral daily  multivitamin 1 Tablet(s) Oral daily  piperacillin/tazobactam IVPB. 3.375 Gram(s) IV Intermittent once  piperacillin/tazobactam IVPB.. 3.375 Gram(s) IV Intermittent every 8 hours  sotalol. 120 milliGRAM(s) Oral <User Schedule>    MEDICATIONS  (PRN):  polyethylene glycol 3350 17 Gram(s) Oral two times a day PRN Constipation    CAPILLARY BLOOD GLUCOSE    I&O's Summary    PHYSICAL EXAM:  Vital Signs Last 24 Hrs  T(C): 36.3 (28 Nov 2022 11:30), Max: 36.7 (27 Nov 2022 16:14)  T(F): 97.3 (28 Nov 2022 11:30), Max: 98.1 (27 Nov 2022 22:40)  HR: 60 (28 Nov 2022 11:30) (60 - 73)  BP: 101/54 (28 Nov 2022 11:30) (101/54 - 123/81)  BP(mean): --  RR: 17 (28 Nov 2022 11:30) (16 - 18)  SpO2: 100% (28 Nov 2022 11:30) (98% - 100%)    Parameters below as of 28 Nov 2022 11:30  Patient On (Oxygen Delivery Method): room air    CONSTITUTIONAL: NAD, well-developed, well-groomed  EYES: conjunctiva and sclera clear  ENMT: Moist oral mucosa  RESPIRATORY: Normal respiratory effort; lungs are clear to auscultation bilaterally  CARDIOVASCULAR: Regular rate and rhythm, normal S1 and S2, No lower extremity edema; Peripheral pulses are 2+ bilaterally  ABDOMEN: Nontender to palpation, normoactive bowel sounds, no rebound/guarding  PSYCH: calm; affect appropriate  NEUROLOGY: moving all extremities  SKIN: No rashes; no palpable lesions    LABS:                        9.5    9.81  )-----------( 129      ( 28 Nov 2022 03:25 )             30.0     11-28    135  |  98  |  5<L>  ----------------------------<  93  3.3<L>   |  23  |  0.62    Ca    7.7<L>      28 Nov 2022 11:31  Phos  2.1     11-28  Mg     1.60     11-28      RADIOLOGY & ADDITIONAL TESTS: Reviewed    COORDINATION OF CARE:  Care Discussed with Consultants/Other Providers [Y- Medicine ACP]

## 2022-11-28 NOTE — PROGRESS NOTE ADULT - ASSESSMENT
87F PMH rectal cancer s/p polypectomy (2015), afib on xarelto, HTN, HLD p/w 2 episodes of melena. CT scan findings with concern of thombosed hemorrhoid. No thrombosed hemorrhoid appreciated on VALE. Patient asymptomatic.     PLAN  - Replete electrolytes aggressively  - No acute surgical intervention  - f/u C scope today  - Trend H&H  - Hold xarelto 2/2 GIB   - f/u stool studies for infectious colitis   - Care per primary team     A Team Surgery  [id number]

## 2022-11-29 NOTE — PROGRESS NOTE ADULT - SUBJECTIVE AND OBJECTIVE BOX
[facility] Medicine  Sandra Trotter MD  Pager #[phone]    Patient is a 87y old  Female who presents with a chief complaint of maroon stool (29 Nov 2022 07:05)    SUBJECTIVE / OVERNIGHT EVENTS: No acute events. S/p scope yesterday. Currently feels well, no abdominal pain, nausea, vomiting, fever, chills.     MEDICATIONS  (STANDING):  ascorbic acid 500 milliGRAM(s) Oral two times a day  atorvastatin 10 milliGRAM(s) Oral at bedtime  furosemide    Tablet 20 milliGRAM(s) Oral daily  influenza  Vaccine (HIGH DOSE) 0.7 milliLiter(s) IntraMuscular once  methimazole 5 milliGRAM(s) Oral daily  multivitamin 1 Tablet(s) Oral daily  piperacillin/tazobactam IVPB. 3.375 Gram(s) IV Intermittent once  piperacillin/tazobactam IVPB.. 3.375 Gram(s) IV Intermittent every 8 hours  sotalol. 120 milliGRAM(s) Oral <User Schedule>    MEDICATIONS  (PRN):  polyethylene glycol 3350 17 Gram(s) Oral two times a day PRN Constipation    CAPILLARY BLOOD GLUCOSE    I&O's Summary    PHYSICAL EXAM:  Vital Signs Last 24 Hrs  T(C): 36.7 (29 Nov 2022 05:17), Max: 36.7 (29 Nov 2022 05:17)  T(F): 98.1 (29 Nov 2022 05:17), Max: 98.1 (29 Nov 2022 05:17)  HR: 60 (29 Nov 2022 09:15) (60 - 92)  BP: 120/64 (29 Nov 2022 09:15) (93/70 - 136/61)  BP(mean): --  RR: 17 (29 Nov 2022 05:17) (16 - 27)  SpO2: 98% (29 Nov 2022 05:17) (95% - 100%)    Parameters below as of 29 Nov 2022 05:17  Patient On (Oxygen Delivery Method): room air    CONSTITUTIONAL: NAD, well-developed, well-groomed  EYES: conjunctiva and sclera clear  ENMT: Moist oral mucosa  RESPIRATORY: Normal respiratory effort; lungs are clear to auscultation bilaterally  CARDIOVASCULAR: Regular rate and rhythm, normal S1 and S2, No lower extremity edema; Peripheral pulses are 2+ bilaterally  ABDOMEN: Soft, NT, ND, +BS  PSYCH: calm; affect appropriate  NEUROLOGY: moving all extremities  SKIN: No rashes; no palpable lesions    LABS:                        9.4    10.17 )-----------( 178      ( 29 Nov 2022 06:50 )             30.4     11-29    135  |  101  |  4<L>  ----------------------------<  82  4.0   |  22  |  0.55    Ca    7.9<L>      29 Nov 2022 06:50  Phos  2.1     11-28  Mg     1.60     11-28    RADIOLOGY & ADDITIONAL TESTS: Reviewed    COORDINATION OF CARE:  Care Discussed with Consultants/Other Providers [Y- Medicine ACP]

## 2022-11-29 NOTE — PROGRESS NOTE ADULT - ASSESSMENT
87F PMH rectal cancer s/p polypectomy (2015), afib on xarelto, HTN, HLD p/w 2 episodes of melena. CT scan findings with concern of thombosed hemorrhoid. No thrombosed hemorrhoid appreciated on VALE. Patient asymptomatic.     PLAN  - No surgical intervention  - Trend H&H  - Hold xarelto 2/2 GIB   - f/u stool studies for infectious colitis   - Care per primary team     A Team Surgery  [id number]   87F PMH rectal cancer s/p polypectomy (2015), afib on xarelto, HTN, HLD p/w 2 episodes of melena. CT scan findings with concern of thombosed hemorrhoid. No thrombosed hemorrhoid appreciated on VALE. Patient asymptomatic.     PLAN  - No surgical intervention  - Advance diet as tolerated  - Trend H&H  - Hold xarelto 2/2 GIB   - f/u stool studies for infectious colitis   - Care per primary team     A Team Surgery  [id number]   87F PMH rectal cancer s/p polypectomy (2015), afib on xarelto, HTN, HLD p/w 2 episodes of melena. CT scan findings with concern of thombosed hemorrhoid. No thrombosed hemorrhoid appreciated on VALE. Patient asymptomatic.     PLAN  - Advance diet as tolerated  - Trend H&H  - Hold xarelto 2/2 GIB   - f/u stool studies for infectious colitis   - Care per primary team   - No surgical intervention - reconsult PRN    A Team Surgery  [id number]

## 2022-11-29 NOTE — PROGRESS NOTE ADULT - SUBJECTIVE AND OBJECTIVE BOX
SURGERY DAILY PROGRESS NOTE:     SUBJECTIVE/ROS: No acute events overnight. Patient seen and examined bedside on AM rounds. Colonoscopy revealed friable mucosa likely related to colitis and internal hemorrhoids. No stool cultures collected. No BMs overnight.      OBJECTIVE:  Vital Signs Last 24 Hrs  T(C): 36.7 (29 Nov 2022 05:17), Max: 36.7 (29 Nov 2022 05:17)  T(F): 98.1 (29 Nov 2022 05:17), Max: 98.1 (29 Nov 2022 05:17)  HR: 63 (29 Nov 2022 05:17) (60 - 92)  BP: 132/62 (29 Nov 2022 05:17) (93/70 - 136/61)  BP(mean): --  RR: 17 (29 Nov 2022 05:17) (16 - 27)  SpO2: 98% (29 Nov 2022 05:17) (95% - 100%)    Parameters below as of 29 Nov 2022 05:17  Patient On (Oxygen Delivery Method): room air      PHYSICAL EXAM:  Constitutional: NAD, pt sleeping comfortably  Respiratory: non-labored breathing, patent airway  Extremities: warm  Neurological: confused  Rectal: no stool in diaper                          9.5    9.81  )-----------( 129      ( 28 Nov 2022 03:25 )             30.0     11-28    139  |  102  |  5<L>  ----------------------------<  105<H>  3.4<L>   |  26  |  0.62    Ca    7.9<L>      28 Nov 2022 21:18  Phos  2.1     11-28  Mg     1.60     11-28       I&O's Detail      IMAGING:  < from: Colonoscopy (11.28.22 @ 14:10) >  Patient Name: Jessie Mcdonough             Procedure Date: 11/28/2022 2:10 PM  MRN: [id number]                     Account Number: [id number]  YOB: 1935             Admit Type: Inpatient  Room: [facility] 03                         Gender: Female  Attending MD: SAMRA BREWSTER MD     < end of copied text >  < from: Colonoscopy (11.28.22 @ 14:10) >  Findings:       Hemorrhoids were found on perianal exam and retroflexion, with an        internal hemorrhoid corresponding to the lesion on CT.       An area of moderately congested friable mucosa with ulcerations was        found in the sigmoid colon. Biopsies were taken with a cold forceps for        histology. Estimated blood loss: minimal.       The remainder of the examined colon appeared normal.                                                                                   Impression:          - Hemorrhoids found on perianal exam.                       - Congested friable mucosa with ulcerations in the                        sigmoid colon, consistent with colitis. Biopsied. Likely                        etiology of hematochezia. Differential - infectious vs                        ischemic.                       - The remainder of the examined colon is normal.  Recommendation:      - [facility] to [facility].                       - Advance diet astolerated today.                       - Await pathology results.                       - Check stool for Clostridium difficile toxin.    < end of copied text >           SURGERY DAILY PROGRESS NOTE:     SUBJECTIVE/ROS: No acute events overnight. Patient seen and examined bedside on AM rounds. Colonoscopy revealed friable mucosa likely related to colitis and internal hemorrhoids. No stool cultures collected. No BMs overnight.     OBJECTIVE:  Vital Signs Last 24 Hrs  T(C): 36.7 (29 Nov 2022 05:17), Max: 36.7 (29 Nov 2022 05:17)  T(F): 98.1 (29 Nov 2022 05:17), Max: 98.1 (29 Nov 2022 05:17)  HR: 63 (29 Nov 2022 05:17) (60 - 92)  BP: 132/62 (29 Nov 2022 05:17) (93/70 - 136/61)  BP(mean): --  RR: 17 (29 Nov 2022 05:17) (16 - 27)  SpO2: 98% (29 Nov 2022 05:17) (95% - 100%)    Parameters below as of 29 Nov 2022 05:17  Patient On (Oxygen Delivery Method): room air      PHYSICAL EXAM:  Constitutional: NAD, pt sleeping comfortably  Respiratory: non-labored breathing, patent airway  Extremities: warm  Neurological: confused  Rectal: no stool in diaper                          9.5    9.81  )-----------( 129      ( 28 Nov 2022 03:25 )             30.0     11-28    139  |  102  |  5<L>  ----------------------------<  105<H>  3.4<L>   |  26  |  0.62    Ca    7.9<L>      28 Nov 2022 21:18  Phos  2.1     11-28  Mg     1.60     11-28       I&O's Detail      IMAGING:  < from: Colonoscopy (11.28.22 @ 14:10) >  Patient Name: Jessie Mcdonough             Procedure Date: 11/28/2022 2:10 PM  MRN: [id number]                     Account Number: [id number]  YOB: 1935             Admit Type: Inpatient  Room: [facility] 03                         Gender: Female  Attending MD: SAMRA BREWSTER MD     < end of copied text >  < from: Colonoscopy (11.28.22 @ 14:10) >  Findings:       Hemorrhoids were found on perianal exam and retroflexion, with an        internal hemorrhoid corresponding to the lesion on CT.       An area of moderately congested friable mucosa with ulcerations was        found in the sigmoid colon. Biopsies were taken with a cold forceps for        histology. Estimated blood loss: minimal.       The remainder of the examined colon appeared normal.                                                                                   Impression:          - Hemorrhoids found on perianal exam.                       - Congested friable mucosa with ulcerations in the                        sigmoid colon, consistent with colitis. Biopsied. Likely                        etiology of hematochezia. Differential - infectious vs                        ischemic.                       - The remainder of the examined colon is normal.  Recommendation:      - Return [facility] to [facility] for [facility] care.                       - Advance diet astolerated today.                       - Await pathology results.                       - Check stool for Clostridium difficile toxin.    < end of copied text >

## 2022-11-29 NOTE — PROGRESS NOTE ADULT - ATTENDING COMMENTS
Date of service 11/26    Agree with resident note above    No addl bloody BM overnight  Hb stable today   Hold AC  pending colonoscopy on Monday
Date of service 11/27    Agree with resident note    BM yesterday less bloody  Hb stable  For colonoscopy tomorrow
DATE OF SERVICE: 11-28-22 @ 10:12    Agree with Resident note above    Hb stable  For colonoscopy per GI  Monitor electrolytes
No further hematochezia. Hemoglobin/hematocrit stable. Only complaint is pain in hand from potassium infusions. Continue to monitor serial hemoglobin/hematocrit. Plan for colonoscopy on 11/28.
Resting comfortably. No further hematochezia. Recommendations as noted-monitor serial hemoglobin/hematocrit. Observe for any recurrence of hematochezia. Scheduled for colonoscopy 11/28 to evaluate recent hematochezia and abnormal CT scan suggestive of a colitis.
DATE OF SERVICE: 11-29-22 @ 11:42    Agree with Resident note above    Colonoscopy yesterday showing colitis, internal hemorrhoids, no mass noted    Colitis treatment per primary team  Can start bowel regimen if pt not having diarrhea for management of hemorrhoids  Add Fiber BID, increase PO water intake, miralax for constipation  No additional surgical intervention, please recall as needed

## 2022-11-29 NOTE — PROGRESS NOTE ADULT - ASSESSMENT
87 year old female with colon cancer s/p resection (no treatment), afib on xarelto, HTN, HLD presenting with maroon stool c/f GIB.

## 2022-11-30 NOTE — PROGRESS NOTE ADULT - SUBJECTIVE AND OBJECTIVE BOX
[facility] Division of Hospital Medicine  Sandra Trotter MD  Pager #[phone]    Patient is a 87y old  Female who presents with a chief complaint of maroon stool (29 Nov 2022 12:03)    SUBJECTIVE / OVERNIGHT EVENTS: No acute events. Watery BM overnight. No abd pain, nausea, vomiting, fever, chills. No signs of bleeding.    MEDICATIONS  (STANDING):  ascorbic acid 500 milliGRAM(s) Oral two times a day  atorvastatin 10 milliGRAM(s) Oral at bedtime  furosemide    Tablet 20 milliGRAM(s) Oral daily  influenza  Vaccine (HIGH DOSE) 0.7 milliLiter(s) IntraMuscular once  magnesium oxide 400 milliGRAM(s) Oral three times a day with meals  methimazole 5 milliGRAM(s) Oral daily  multivitamin 1 Tablet(s) Oral daily  rivaroxaban 15 milliGRAM(s) Oral with dinner  sotalol. 120 milliGRAM(s) Oral <User Schedule>    MEDICATIONS  (PRN):  polyethylene glycol 3350 17 Gram(s) Oral two times a day PRN Constipation    CAPILLARY BLOOD GLUCOSE    I&O's Summary    29 Nov 2022 07:01  -  30 Nov 2022 07:00  --------------------------------------------------------  IN: 0 mL / OUT: 600 mL / NET: -600 mL    PHYSICAL EXAM:  Vital Signs Last 24 Hrs  T(C): 36.7 (30 Nov 2022 08:45), Max: 36.7 (30 Nov 2022 08:45)  T(F): 98.1 (30 Nov 2022 08:45), Max: 98.1 (30 Nov 2022 08:45)  HR: 68 (30 Nov 2022 08:45) (60 - 70)  BP: 131/56 (30 Nov 2022 08:45) (125/66 - 131/65)  BP(mean): --  RR: 18 (30 Nov 2022 08:45) (18 - 18)  SpO2: 94% (30 Nov 2022 08:45) (94% - 99%)    Parameters below as of 30 Nov 2022 08:45  Patient On (Oxygen Delivery Method): room air    CONSTITUTIONAL: NAD, well-developed, well-groomed  EYES: conjunctiva and sclera clear  ENMT: Moist oral mucosa  RESPIRATORY: Normal respiratory effort; lungs are clear to auscultation bilaterally  CARDIOVASCULAR: Regular rate and rhythm, normal S1 and S2, No lower extremity edema; Peripheral pulses are 2+ bilaterally  ABDOMEN: Nontender to palpation, normoactive bowel sounds, no rebound/guarding  PSYCH: calm, affect appropriate  NEUROLOGY: moving all extremities  SKIN: No rashes; no palpable lesions    LABS:                        10.3   11.61 )-----------( 197      ( 30 Nov 2022 06:30 )             32.6     11-30    137  |  101  |  4<L>  ----------------------------<  84  3.3<L>   |  25  |  0.71    Ca    8.0<L>      30 Nov 2022 06:30  Phos  2.1     11-30  Mg     1.70     11-30    RADIOLOGY & ADDITIONAL TESTS: Reviewed    COORDINATION OF CARE:  Care Discussed with Consultants/Other Providers [Y- GI team]

## 2022-12-01 NOTE — DISCHARGE NOTE PROVIDER - HOSPITAL COURSE
87 year old female with colon cancer s/p resection (no treatment), afib on xarelto, HTN, HLD presenting with maroon stool c/f GIB.   Melena.   CTA A/P showed  proctocolitis with no evidence of active contrast extravasation into the lumen of the gastrointestinal tract. 12 x 7 mm ovoid hypoattenuating focus in the wall of the anorectal region on the left side, with mild peripheral enhancement, possibly a thrombosed hemorrhoid   - S/p colonoscopy 11/28 with hemorrhoids, colitis s/p biopsy, GI suspecting infectious vs ischemic.   - Cdiff negative on 11/30   - s/p 6 days zosyn. Discussed with GI, no need for further treatment, will discontinue.   - xarelto resumed on 11/30 . Hemoglobin remains stable   - outpt follow up with GI     Problem/Plan - 2:   Proctocolitis.    Management as above, f/u infectious stool studies.    Problem/Plan - 3:  : Afib.    -Chronic Atrial fibrillation, Pt takes sotolol and xarelto at home  -Resuming xarelto as above (home dose of 15 qhs)  -c/w sotalol.    Problem/Plan - 4:   Prophylactic measure.    DVT ppx: now on Xarelto     Hyperthyroidism: c/w methimazole    Patient medically stable for discharge to home today as discussed with Dr. Trotter . Follow up as outpt with gastroenterology 87 year old female with colon cancer s/p resection (no treatment), afib on xarelto, HTN, HLD presenting with maroon stool c/f GIB.  Problem 1: Melena.   CTA A/P showed  proctocolitis with no evidence of active contrast extravasation into the lumen of the gastrointestinal tract. 12 x 7 mm ovoid hypoattenuating focus in the wall of the anorectal region on the left side, with mild peripheral enhancement, possibly a thrombosed hemorrhoid   - S/p colonoscopy 11/28 with hemorrhoids, colitis s/p biopsy, GI suspecting infectious vs ischemic.   - Cdiff negative on 11/30   - s/p 6 days zosyn. Discussed with GI, no need for further treatment, will discontinue.   - xarelto resumed on 11/30 . Hemoglobin remains stable   - outpt follow up with GI     Problem/Plan - 2:   Proctocolitis.    Management as above, f/u infectious stool studies.    Problem/Plan - 3:  : Afib.    -Chronic Atrial fibrillation, Pt takes sotolol and xarelto at home  -Resuming xarelto as above (home dose of 15 qhs)  -c/w sotalol.    Problem/Plan - 4:   Prophylactic measure.    DVT ppx: now on Xarelto     Hyperthyroidism: c/w methimazole    Patient medically stable for discharge to home today as discussed with Dr. Trotter . Follow up as outpt with gastroenterology. Hg stable after resuming xarelto, no further signs of bleeding.

## 2022-12-01 NOTE — PROGRESS NOTE ADULT - REASON FOR ADMISSION
maroon stool

## 2022-12-01 NOTE — PROGRESS NOTE ADULT - PROBLEM SELECTOR PLAN 3
-Chronic Atrial fibrillation, Pt takes sotolol and xarelto at home  -Will hold xarelto due to maroon stool  -c/w sotalol  -resume AC pending GI recs
-Chronic Atrial fibrillation, Pt takes sotolol and xarelto at home  -Will hold xarelto due to maroon stool  -c/w sotalol  -resume AC pending GI recs/scope
-Chronic Atrial fibrillation, Pt takes sotolol and xarelto at home  -Will hold xarelto due to maroon stool  -c/w sotalol
-Chronic Atrial fibrillation, Pt takes sotolol and xarelto at home  -Resuming xarelto as above (home dose of 15 qhs)  -c/w sotalol
-Chronic Atrial fibrillation, Pt takes sotolol and xarelto at home  -Will hold xarelto due to maroon stool  -c/w sotalol
-Chronic Atrial fibrillation, Pt takes sotolol and xarelto at home  -Will hold xarelto due to maroon stool  -c/w sotalol
-Chronic Atrial fibrillation, Pt takes sotolol and xarelto at home  -Resumed xarelto as above (home dose of 15 qhs), hg stable  -c/w sotalol

## 2022-12-01 NOTE — DISCHARGE NOTE PROVIDER - NSDCMRMEDTOKEN_GEN_ALL_CORE_FT
ascorbic acid 500 mg oral tablet: 1 tab(s) orally 2 times a day  furosemide 20 mg oral tablet: 1 tab(s) orally once a day  methIMAzole 5 mg oral tablet: 1 tab(s) orally once a day  Multiple Vitamins oral tablet: 1 tab(s) orally once a day  polyethylene glycol 3350 oral powder for reconstitution: 17 gram(s) orally 2 times a day, As needed, Constipation  pravastatin 20 mg oral tablet: 1 tab(s) orally once a day  rivaroxaban 15 mg oral tablet: 1 tab(s) orally once a day (before a meal)  sotalol 120 mg oral tablet: 1 tab(s) orally once a day

## 2022-12-01 NOTE — DISCHARGE NOTE PROVIDER - DETAILS OF MALNUTRITION DIAGNOSIS/DIAGNOSES
This patient has been assessed with a concern for Malnutrition and was treated during this hospitalization for the following Nutrition diagnosis/diagnoses:     -  11/27/2022: Severe protein-calorie malnutrition

## 2022-12-01 NOTE — DISCHARGE NOTE NURSING/CASE MANAGEMENT/SOCIAL WORK - PATIENT PORTAL LINK FT
You can access the FollowMyHealth Patient Portal offered by [facility] by registering at the following website: [email or web address]. By joining [facility]’s FollowMyHealth portal, you will also be able to view your health information using other applications (apps) compatible with our system.

## 2022-12-01 NOTE — DISCHARGE NOTE NURSING/CASE MANAGEMENT/SOCIAL WORK - NSDCPEFALRISK_GEN_ALL_CORE
For information on Fall & Injury Prevention, visit: [email or web address] OR  [email or web address] OR  https://www.cdc.gov/steadi/patient.html

## 2022-12-01 NOTE — PROGRESS NOTE ADULT - PROVIDER SPECIALTY LIST ADULT
Colorectal Surgery
Colorectal Surgery
Gastroenterology
Surgery
Surgery
Gastroenterology
Hospitalist

## 2022-12-01 NOTE — DISCHARGE NOTE PROVIDER - NSFOLLOWUPCLINICS_GEN_ALL_ED_FT
[facility] Gastroenterology  Gastroenterology  [address] [place] [id number]  Phone: [phone]  Fax:   Follow Up Time: 2 weeks

## 2022-12-01 NOTE — PROGRESS NOTE ADULT - PROBLEM SELECTOR PLAN 1
-Pt w/ 2 episodes of maroon stools, CTA A/P showed  proctocolitis with no evidence of active contrast extravasation into the lumen of the gastrointestinal tract. 12 x 7 mm ovoid hypoattenuating focus in the wall of the anorectal region on the left side, with mild peripheral enhancement, possibly a thrombosed hemorrhoid   - S/p colonoscopy 11/28 with hemorrhoids, colitis s/p biopsy, GI suspecting infectious vs ischemic.   - c diff negative  - s/p 6 days zosyn. Discussed with GI, no need for further treatment, discontinued   - Hg stable, no signs of active bleed, per GI okay to resume AC, now restarted, hg remains stable no signs of bleeding
-Pt w/ 2 episodes of maroon stools   -CTA A/P showed  proctocolitis with no evidence of active contrast extravasation into the lumen of the   gastrointestinal tract. 12 x 7 mm ovoid hypoattenuating focus in the wall of the anorectal region on the left side, with mild peripheral enhancement, possibly a thrombosed hemorrhoid  -Hgb 11s->9s currently, no current signs of active bleed  -Will cont to monitor trend hgb, transfuse if <7  -Plan for scope today pending improvement in AM potassium, follow up GI recs  -No acute intervention per surgery recs for possibly thrombosed hemorrhoid
-Pt w/ 2 episodes of maroon stools   -CTA A/P showed  proctocolitis with no evidence of active contrast extravasation into the lumen of the   gastrointestinal tract. 12 x 7 mm ovoid hypoattenuating focus in the wall of the anorectal region on the left side, with mild peripheral enhancement, possibly a thrombosed hemorrhoid  -Hgb dropped in the ED from 11.6 -> 10.1  -Will cont to monitor trend hgb, transfuse if <7  -f/u GI recs  -f/u surgery recs for possibly thrombosed hemorrhoid
-Pt w/ 2 episodes of maroon stools   -CTA A/P showed  proctocolitis with no evidence of active contrast extravasation into the lumen of the   gastrointestinal tract. 12 x 7 mm ovoid hypoattenuating focus in the wall of the anorectal region on the left side, with mild peripheral enhancement, possibly a thrombosed hemorrhoid  -Hgb dropped in the ED from 11.6 -> 10.1  -Will cont to monitor trend hgb, transfuse if <7  -Plan for C-scope on Monday per GI, will f/u if pt still need Abx, bowel prep 11/27  -No acute intervention per surgery recs for possibly thrombosed hemorrhoid
-Pt w/ 2 episodes of maroon stools, CTA A/P showed  proctocolitis with no evidence of active contrast extravasation into the lumen of the gastrointestinal tract. 12 x 7 mm ovoid hypoattenuating focus in the wall of the anorectal region on the left side, with mild peripheral enhancement, possibly a thrombosed hemorrhoid   - S/p colonoscopy 11/28 with hemorrhoids, colitis s/p biopsy, GI suspecting infectious vs ischemic.   - f/u infectious stool studies including c diff  - continues on zosyn at this time pending infectious studies  - hg stable at this time
-Pt w/ 2 episodes of maroon stools, CTA A/P showed  proctocolitis with no evidence of active contrast extravasation into the lumen of the gastrointestinal tract. 12 x 7 mm ovoid hypoattenuating focus in the wall of the anorectal region on the left side, with mild peripheral enhancement, possibly a thrombosed hemorrhoid   - S/p colonoscopy 11/28 with hemorrhoids, colitis s/p biopsy, GI suspecting infectious vs ischemic.   - f/u infectious stool studies including c diff (sent this AM)  - s/p 6 days zosyn. Discussed with GI, no need for further treatment, will discontinue.   - Hg stable, no signs of active bleed, per GI okay to resume AC, will resume xarelto, monitor Hg closely
-Pt w/ 2 episodes of maroon stools   -CTA A/P showed  proctocolitis with no evidence of active contrast extravasation into the lumen of the   gastrointestinal tract. 12 x 7 mm ovoid hypoattenuating focus in the wall of the anorectal region on the left side, with mild peripheral enhancement, possibly a thrombosed hemorrhoid  -Hgb dropped in the ED from 11.6 -> 10.1  -Will cont to monitor trend hgb, transfuse if <7  -Plan for C-scope on Monday per GI, will f/u if pt still need Abx  -No acute intervention per surgery recs for possibly thrombosed hemorrhoid

## 2022-12-01 NOTE — PROGRESS NOTE ADULT - PROBLEM SELECTOR PLAN 4
DVT ppx: SCDs due to c/f bleed    Hyperthyroidism: c/w methimazole    Discussed with daughter at bedside 11/28 and 11/29
DVT ppx: SCD due to maroon stool    Hyperthyroidism: c/w methimazole    Discussed with daughter at bedside 11/25
DVT ppx: SCD due to maroon stool    Hyperthyroidism: c/w methimazole    Discussed with daughter at bedside 11/26
DVT ppx: xarelto    Hyperthyroidism: c/w methimazole
DVT ppx: SCD due to maroon stool    Hyperthyroidism: c/w methimazole    Discussed with daughter at bedside 11/26
DVT ppx: resuming home AC with xarelto    Hyperthyroidism: c/w methimazole    Discussed with daughter at bedside 11/28 and 11/29, and 11/30
DVT ppx: SCDs due to c/f bleed    Hyperthyroidism: c/w methimazole    Discussed with daughter at bedside 11/28

## 2022-12-01 NOTE — PROGRESS NOTE ADULT - PROBLEM SELECTOR PLAN 2
-Likely 2/2 constipation.   -c/w  zosyn   -Will continue to monitor
-Likely 2/2 constipation.   -c/w  zosyn   -f/u additional GI recs
Management as above, c diff negative
-Likely 2/2 constipation.   -c/w zosyn   -f/u additional GI recs
-Likely 2/2 constipation.   -c/w  zosyn   -Will continue to monitor
Management as above, f/u infectious stool studies
-Likely 2/2 constipation.   -c/w  zosyn   -Will continue to monitor

## 2022-12-01 NOTE — CHART NOTE - NSCHARTNOTEFT_GEN_A_CORE
General surgery consulted for thrombosed hemorrhoid evaluation.     Ced Rubalcava NP   spectra [phone]
Nutrition Note:    Pt scheduled to be d/c today.  Since d/c is imminent, f/u at this time is not warranted.  Will remain available if plans changed.    Shanon Estrada RDN #[id number]
Patient s/p colonoscopy on 11/28 with findings of hemorrhoids found on perianal exam, congested friable mucosa with ulcerations in the sigmoid colon, consistent with colitis. Biopsied. Likely etiology of hematochezia. Differential includes infectious vs ischemic. Would send for infectious workup, including C difficle.     Please provide patient with Gastroenterology Clinic outpatient follow up number for an appointment [phone] ([facility] [place]) or [phone] ([facility] at [address] [place] [address]) for outpatient followup.     Gastroenterology will sign off at this time. Please re-consult for any other further questions.     Suly Acosta MD, PGY4  GI Fellow

## 2022-12-01 NOTE — DISCHARGE NOTE PROVIDER - NSDCCPCAREPLAN_GEN_ALL_CORE_FT
PRINCIPAL DISCHARGE DIAGNOSIS  Diagnosis: Proctocolitis  Assessment and Plan of Treatment: completed course of antibiotics   you had colonoscopy during your admission   follow up with Gastroeneterology in 2 weeks  please notify your doctor immediately if you notice any bleeding      SECONDARY DISCHARGE DIAGNOSES  Diagnosis: Chronic atrial fibrillation  Assessment and Plan of Treatment: continue xarelto   continue your sotalol

## 2022-12-06 PROBLEM — I48.91 UNSPECIFIED ATRIAL FIBRILLATION: Chronic | Status: ACTIVE | Noted: 2022-01-01

## 2022-12-06 PROBLEM — Z00.00 ENCOUNTER FOR PREVENTIVE HEALTH EXAMINATION: Status: ACTIVE | Noted: 2022-01-01

## 2022-12-08 PROBLEM — K59.09 CHRONIC CONSTIPATION: Status: ACTIVE | Noted: 2022-01-01

## 2022-12-08 PROBLEM — K52.9 COLITIS: Status: ACTIVE | Noted: 2022-01-01

## 2022-12-08 NOTE — ED PROVIDER NOTE - CLINICAL SUMMARY MEDICAL DECISION MAKING FREE TEXT BOX
Impression: 87-year-old female pmhx of A. fib on Xarelto, hemorrhoids, hyperlipidemia, hypothyroidism, prior resolved colon cancer, recent admission for GI bleed s/p colonoscopy with colitis seen comes to ED w/ rectal bleeding. Their symptoms of rectal bleeding, exam findings of visible johnathan bloody stool on diaper in the setting of recent diagnosis for colitis are concerning for ongoing colitis, viral illness, diverticulitis, anemia.    Ordered labs, imaging, medications for diagnosis, management, and treatment.

## 2022-12-08 NOTE — ED ADULT TRIAGE NOTE - CHIEF COMPLAINT QUOTE
*Barbadian SPEAKING* rectal bleeding since yesterday, takes xarelto for Afib, daughter states pt appears more pale than baseline

## 2022-12-08 NOTE — ED PROVIDER NOTE - OBJECTIVE STATEMENT
87-year-old female  pmhx of A. fib on Xarelto, hemorrhoids, hyperlipidemia, hypothyroidism, prior resolved colon cancer, recent admission for GI bleed s/p colonoscopy with colitis seen comes to ED w/ rectal bleeding. Daughter reports that they are changing the patient and a spray of bloody stool x1 came out of their rectum. their pain/symptom is moderate, constant, non mediating with rest.daughter reports that patient looks paler than usual.  Denies lightheadedness, dizziness, falls, trauma, nausea, vomiting, chest pain, shortness of breath, urinary symptoms. 87-year-old female pmhx of A. fib on Xarelto, hemorrhoids, hyperlipidemia, hypothyroidism, prior resolved colon cancer, recent admission for GI bleed s/p colonoscopy with colitis seen comes to ED w/ rectal bleeding. Daughter reports that they were changing the patient and a spray of bloody stool x1 came out of their rectum. Their pain/symptom is moderate, constant, non mediating with rest. Daughter reports that the patient skin looks paler than usual.  Denies lightheadedness, dizziness, falls, trauma, nausea, vomiting, chest pain, shortness of breath, urinary symptoms.     Patient offered translation services for Vietnamese however requested daughter translate at bedside.

## 2022-12-08 NOTE — ED PROVIDER NOTE - PHYSICAL EXAMINATION
General: NAD  HEENT: NCAT, PERRL  Cardiac: RRR, no murmurs, 2+ peripheral pulses  Chest: CTAB  Abdomen: soft, non-distended, bowel sounds present, no ttp, no rebound or guarding  Rectal: Visible johnathan bloody stool on diaper.  Extremities: Moderate non-pitting peripheral edema. No calf tenderness or leg size discrepancies  Skin: Multiple ecchymotic bruises throughout hands. no rashes  Neuro: AAOx4, 5+motor, sensory grossly intact  Psych: mood and affect appropriate

## 2022-12-08 NOTE — ASSESSMENT
[FreeTextEntry1] : The symptoms of IBS-C include abdominal pain and discomfort, along with changes in bowel function.  Bloating and/or gas also may happen. Changes in bowel function may include straining, infrequent stools, hard or lumpy stools, and/or a feeling that the bowel does not empty completely. Some people may feel as if there is a “blockage” preventing them from passing stools. They may need to press on a part of their body or change body position to help them complete their bowel movement. How often a person passes stool, or the way it appears, may be different when abdominal discomfort is happening. With IBS-C, abdominal discomfort often improves after a bowel movement. In most cases, symptoms are ongoing (chronic), but they may come and go.\par \par \par The cause of IBS-C is not known. Some experts think that it relates to changes in how the intestines move and contract, or changes in how the gut senses pain. In some patients, IBS-C may happen after a past infection in the gut. It could also be related to changes in the messages between the brain and the intestines. There is evidence that bacteria which are normally found in the gut, or changes to the composition of those bacteria, play a role. In addition, researchers are looking into possible roles of genetics and/or changes in the immune system.\par \par We discussed this at length.\par \par I spent 45 minutes reviewing the patients records prior to arrival, with patient , and reviewing records after visit. All prior testing reviewed at length. All questions were answered.\par \par

## 2022-12-08 NOTE — PHYSICAL EXAM

## 2022-12-08 NOTE — ED ADULT NURSE NOTE - CHIEF COMPLAINT QUOTE
*Zambian SPEAKING* rectal bleeding since yesterday, takes xarelto for Afib, daughter states pt appears more pale than baseline

## 2022-12-08 NOTE — HISTORY OF PRESENT ILLNESS
[FreeTextEntry1] : Post Hosp for colitis/Colon / ABX \par \par Now resolved\par \par Mild constipation \par \par With daughter

## 2022-12-08 NOTE — ED PROVIDER NOTE - NS ED ROS FT
Constitutional: fevers  HEENT: no cough, rhinorrhea  Cardiac: no chest pain, palpitations  Respiratory: no SOB  GI: bloody stool. no n/v, abd pain  : no dysuria, frequency, or hematuria  MSK: no joint pain  Skin: no rashes  Neuro: no headache, change in vision, focal weakness  Psych: negative

## 2022-12-08 NOTE — ED ADULT NURSE NOTE - OBJECTIVE STATEMENT
Pt received in rm 22. Primarily Congolese speaking, daughter at bedside for translation. Per daughter, pt has rectal bleeding since yesterday. PMH afib on Xarelto, colon ca s/p resection, HTN, HLD. Pt was recently admitted to hospital for same complaint, d/c 3x days ago. Pt A&Ox3, ambulatory w/ a walker. Afib on the cardiac monitor. Breathing even and unlabored. Pt denies abd pain. Abd soft, non-distended. Noted to be febrile to 100.7 rectally. Pt denies chest pain, dizziness, SOB, or headache. Pending labs and CT. Will need US IV. MD Campbell made aware. Pt received in rm 22. Primarily Guatemalan speaking, daughter at bedside for translation. Per daughter, pt has rectal bleeding since yesterday. PMH afib on Xarelto, colon ca s/p resection, HTN, HLD. Pt was recently admitted to hospital for same complaint, d/c 6x days ago. Pt A&Ox3, ambulatory w/ a walker. Afib on the cardiac monitor. Breathing even and unlabored. Pt denies abd pain. Abd soft, non-distended. Noted to be febrile to 100.7 rectally. Pt denies chest pain, dizziness, SOB, or headache. Pending labs and CT. Will need US IV. MD Campbell made aware.

## 2022-12-08 NOTE — ED PROVIDER NOTE - PROGRESS NOTE DETAILS
SEEMA: I was signed out this pt pending labs and imaging results as well as RVP results. Pt here for GIB which she has had in past and is on xarelto, was stopped but resumed by Cards for afib. Pt had BRBPR upon arrival but since has been stable. CT imaging did not show any abnormalities for bleeding but has evidence of cystitis although UA neg, culture was sent. Pt is FLU +. Due to her being on xarelto and + guiaic, will admit pt for close monitoring as she has possibility of decompensation. Pt and daughter amenable to plan.

## 2022-12-08 NOTE — ED PROVIDER NOTE - ATTENDING CONTRIBUTION TO CARE
87F p/w rectal bleeding x 1 day, pt came here with DA; h/o afib on xarelto, h/o hemorrohoids, h/o colon CA s/p resection.  Was here 10 days ago for similar, got admitted, got colonoscopy, got IV Abx - eventually d/c a couple of days ago. Pt noted large bloody BM today.  Pt saw GI as outpt.  Febrile here.  Pt appears pale, not tachycardic, HD stable.  Nontender abd.  Johnathan blood in diaper.  Check labs for anemia, infection, Rx abx, check cultures.  Given on AC and is febrile and having johnathan rectal bleeding would admit for monitoring and further eval.   VS:  fever tachypnea    GEN - NAD;  malaise;   A+O x3  (+)pallor  HEAD - NC/AT     ENT - PEERL, EOMI, mucous membranes    moist , no discharge      NECK: Neck supple, non-tender without lymphadenopathy, no masses, no JVD  PULM - CTA b/l,  symmetric breath sounds  COR -  normal heart sounds    ABD - , ND, mild diffuse ttp, soft,  BACK - no CVA tenderness, nontender spine     EXTREMS - (+)1-2 pitting edema, no deformity, warm and well perfused    SKIN - no rash   (+)bruising    to hands c/w inpt hospitalization  NEUROLOGIC - alert, face symmetric, speech fluent, sensation nl, motor no focal deficit.

## 2022-12-09 NOTE — H&P ADULT - WHEEZES
Expiratory wheeze appreciated at posterior left Lower lobe Expiratory wheeze appreciated at posterior left Lower lobe/upper L/upper R

## 2022-12-09 NOTE — H&P ADULT - PROBLEM SELECTOR PLAN 1
GI consult appreciated  Manage bleeding by giving Normal Saline 500 Cc's over 6 hours  Hold aspirin and Xarelto  Monitor BP measurements  Monitor H/H levels Q6 and transfuse PRN if Hgb <7  Type and screen  Consider bleeding scan/ Octreotide Occult Positive  Hgb: 9.8/31.7 (around her baseline)  hsTrop: 14-->12  12/9 CT Abd/Pelvis - no acute intra abdominal abnormality  - recent colonoscopy 11/28/22  - monitor H & H (stable at this time)  - cont xarelto and aspirin at this time; if continues to bleed and H & H drop then consider holding them  - maintain an active Type and screen  - House GI c/s emailed

## 2022-12-09 NOTE — H&P ADULT - HISTORY OF PRESENT ILLNESS
86 yo female with PMHx of A. fibb (on aspirin and Xarelto), perforated appendicitis (1992), cataract surgery, cholecystectomy, hemorrhoids, colitis, resolved UTI, presents to the ED with blood in bowel movement. Daughter explained that yesterday evening she noticed at 6:00PM loose stools in the patient and then at 8:00PM patient had to pass gas and sprayed blood into the toilet upon release. The daughter also thought she saw that the consistency of the stool was moremucous. Daughter reports that patient is typically constipated. Blood volume was described as moderate and concerned the daughter to seek medical attention at [facility] for her mother since mother was hospitalized recently. Daughter admitted that patient had a fever and cough since yesterday evening. Denies any N/V, hematemesis, abdominal pains, chills, and recent weight changes.   88 y/o Occitan speaking female (info obtained from daughter at bedside), with a PmHx of Afib (on aspirin and Xarelto), perforated appendicitis (1992), cataract surgery, cholecystectomy, hemorrhoids, colitis, presents to the ED with blood in bowel movement. Daughter explained that yesterday evening she noticed at 6:00PM loose stools in the patient and then at 8:00PM patient had to pass gas and sprayed blood into the toilet upon release. The daughter also thought she saw that the consistency of the stool was more "mucous". Daughter reports that patient is typically constipated. Blood volume was described as moderate and concerned the daughter to seek medical attention at [facility] for her mother since mother was hospitalized recently. Pt was recently admitted to [facility] on 11/28 for a GI bleed in which she was seen by GI and had a colonoscopy. Of note, daughter stated her mother also had a fever last note and that patients granddaughter (whom lives with them has been sick as well). Denies any N/V, hematemesis, abdominal pains, chills or recent weight changes. In the [facility] ED, she was found to be occult positive but her hgb was stable at 9.8 (at her baseline). She appears comfortable at this time and is now being admitted to medicine for a possible GI bleed with an incidental finding of Influenza AH3 positive.

## 2022-12-09 NOTE — CONSULT NOTE ADULT - SUBJECTIVE AND OBJECTIVE BOX
HPI:  DIMITRY BRAUN is a 87 year old female with history of HTN, HLD, AF on Xarelto, stage 1 rectal cancer s/p EMR in 2015 [no further surgery or chemoradiation], and recent admission for hematochezia and colitis who presents with BRBPR x1.    Patient recently hospitalized for intermittent hematochezia, found to have colitis on CT imaging, and underwent colonoscopy on 11/28/2022 which revealed hemorrhoids, congested friable mucosa with ulcerations in the sigmoid colon c/w colitis [path revealed active colitis with cryptitis]      Otherwise, patient denies fevers, chills, weight loss, dysphagia, odynophagia, early satiety, poor oral intake, abdominal pain, nausea, vomiting, diarrhea, melena, hematemesis, hematochezia, change in stool caliber, or family history of GI-related cancers.    ROS:   General:  No fevers, chills, night sweats, fatigue  Eyes:  Good vision, no reported pain  ENT:  No sore throat, pain, runny nose  CV:  No pain, palpitations  Pulm:  No dyspnea, cough  GI:  See HPI, otherwise negative  :  No  incontinence, nocturia  Muscle:  No pain, weakness  Neuro:  No memory problems  Psych:  No insomnia, mood problems, depression  Endocrine:  No polyuria, polydipsia, cold/heat intolerance  Heme:  No petechiae, ecchymosis, easy bruisability  Skin:  No rash    PMHX/PSHX:    Atrial fibrillation    UTI (urinary tract infection)    Hypothyroidism    Colon cancer    Hemorrhoids    HLD (hyperlipidemia)    History of cholecystectomy    Perforated appendicitis    History of cataract surgery      Allergies:  No Known Allergies      Home Medications: reviewed  Hospital Medications:  acetaminophen     Tablet .. 650 milliGRAM(s) Oral every 6 hours PRN  ascorbic acid 500 milliGRAM(s) Oral two times a day  aspirin enteric coated 81 milliGRAM(s) Oral daily  atorvastatin 10 milliGRAM(s) Oral at bedtime  furosemide    Tablet 20 milliGRAM(s) Oral daily  methimazole 5 milliGRAM(s) Oral daily  multivitamin 1 Tablet(s) Oral daily  rivaroxaban 20 milliGRAM(s) Oral with dinner  sotalol. 120 milliGRAM(s) Oral every 24 hours      Social History:   Tobacco: denies  Alcohol: denies  Recreational drugs: denies    Family history:      Denies family history of colon cancer/polyps, stomach cancer/polyps, pancreatic cancer/masses, liver cancer/disease, ovarian cancer and endometrial cancer.    PHYSICAL EXAM:   Vital Signs:  Vital Signs Last 24 Hrs  T(C): 36.9 (09 Dec 2022 09:22), Max: 38.2 (08 Dec 2022 23:00)  T(F): 98.5 (09 Dec 2022 09:22), Max: 100.7 (08 Dec 2022 23:00)  HR: 82 (09 Dec 2022 09:22) (71 - 89)  BP: 110/64 (09 Dec 2022 09:22) (104/60 - 133/90)  BP(mean): --  RR: 23 (09 Dec 2022 09:22) (16 - 23)  SpO2: 98% (09 Dec 2022 09:22) (96% - 100%)    Parameters below as of 09 Dec 2022 09:22  Patient On (Oxygen Delivery Method): room air      Daily Height in cm: 134.62 (09 Dec 2022 09:26)    Daily     GENERAL: no acute distress  NEURO: alert  HEENT: NCAT, no conjunctival pallor appreciated  CHEST: no respiratory distress, no accessory muscle use  CARDIAC: regular rate, +S1/S2  ABDOMEN: soft, nontender, no rebound or guarding  EXTREMITIES: warm, well perfused  SKIN: no lesions noted    LABS: reviewed                        9.8    8.47  )-----------( 191      ( 09 Dec 2022 00:50 )             31.7     12-09    130<L>  |  91<L>  |  14  ----------------------------<  117<H>  3.7   |  29  |  0.75    Ca    8.9      09 Dec 2022 00:50  Mg     1.80     12-09    TPro  7.7  /  Alb  3.4  /  TBili  0.3  /  DBili  x   /  AST  25  /  ALT  11  /  AlkPhos  80  12-09    LIVER FUNCTIONS - ( 09 Dec 2022 00:50 )  Alb: 3.4 g/dL / Pro: 7.7 g/dL / ALK PHOS: 80 U/L / ALT: 11 U/L / AST: 25 U/L / GGT: x               Diagnostic Studies: see sunrise for full report         HPI:  DIMITRY BRAUN is a 87 year old female with history of HTN, HLD, AF on Xarelto, stage 1 rectal cancer s/p EMR in 2015 [no further surgery or chemoradiation], and recent admission for hematochezia and colitis who presents with BRBPR x1.    Patient recently hospitalized for intermittent hematochezia, found to have colitis on CT imaging, and underwent colonoscopy on 11/28/2022 which revealed hemorrhoids, congested friable mucosa with ulcerations in the sigmoid colon c/w colitis [path revealed active colitis with cryptitis].  Patient just seen by outpatient GI Dr. Brunner on 12/8/2022, and then presents to [facility] ED following one episode of BRBPR.  She has chronic issues with hemorrhoids and constipation.  She remains on Xarelto.  Daughter at bedside able to provide HPI.    ROS:   General:  No fevers, chills, night sweats, fatigue  Eyes:  Good vision, no reported pain  ENT:  No sore throat, pain, runny nose  CV:  No pain, palpitations  Pulm:  No dyspnea, cough  GI:  See HPI, otherwise negative  :  No  incontinence, nocturia  Muscle:  No pain, weakness  Neuro:  No memory problems  Psych:  No insomnia, mood problems, depression  Endocrine:  No polyuria, polydipsia, cold/heat intolerance  Heme:  No petechiae, ecchymosis, easy bruisability  Skin:  No rash    PMHX/PSHX:    Atrial fibrillation    UTI (urinary tract infection)    Hypothyroidism    Colon cancer    Hemorrhoids    HLD (hyperlipidemia)    History of cholecystectomy    Perforated appendicitis    History of cataract surgery      Allergies:  No Known Allergies      Home Medications: reviewed  Hospital Medications:  acetaminophen     Tablet .. 650 milliGRAM(s) Oral every 6 hours PRN  ascorbic acid 500 milliGRAM(s) Oral two times a day  aspirin enteric coated 81 milliGRAM(s) Oral daily  atorvastatin 10 milliGRAM(s) Oral at bedtime  furosemide    Tablet 20 milliGRAM(s) Oral daily  methimazole 5 milliGRAM(s) Oral daily  multivitamin 1 Tablet(s) Oral daily  rivaroxaban 20 milliGRAM(s) Oral with dinner  sotalol. 120 milliGRAM(s) Oral every 24 hours      Social History:   Tobacco: denies  Alcohol: denies  Recreational drugs: denies    Family history:      Denies family history of colon cancer/polyps, stomach cancer/polyps, pancreatic cancer/masses, liver cancer/disease, ovarian cancer and endometrial cancer.    PHYSICAL EXAM:   Vital Signs:  Vital Signs Last 24 Hrs  T(C): 36.9 (09 Dec 2022 09:22), Max: 38.2 (08 Dec 2022 23:00)  T(F): 98.5 (09 Dec 2022 09:22), Max: 100.7 (08 Dec 2022 23:00)  HR: 82 (09 Dec 2022 09:22) (71 - 89)  BP: 110/64 (09 Dec 2022 09:22) (104/60 - 133/90)  BP(mean): --  RR: 23 (09 Dec 2022 09:22) (16 - 23)  SpO2: 98% (09 Dec 2022 09:22) (96% - 100%)    Parameters below as of 09 Dec 2022 09:22  Patient On (Oxygen Delivery Method): room air      Daily Height in cm: 134.62 (09 Dec 2022 09:26)    Daily     GENERAL: no acute distress  NEURO: alert  HEENT: NCAT, no conjunctival pallor appreciated  CHEST: no respiratory distress, no accessory muscle use  CARDIAC: regular rate, +S1/S2  ABDOMEN: soft, nontender, no rebound or guarding  RECTAL: chaperoned, hemorrhoids, empty rectal vault, no bleeding present  EXTREMITIES: warm, well perfused  SKIN: no lesions noted    LABS: reviewed                        9.8    8.47  )-----------( 191      ( 09 Dec 2022 00:50 )             31.7     12-09    130<L>  |  91<L>  |  14  ----------------------------<  117<H>  3.7   |  29  |  0.75    Ca    8.9      09 Dec 2022 00:50  Mg     1.80     12-09    TPro  7.7  /  Alb  3.4  /  TBili  0.3  /  DBili  x   /  AST  25  /  ALT  11  /  AlkPhos  80  12-09    LIVER FUNCTIONS - ( 09 Dec 2022 00:50 )  Alb: 3.4 g/dL / Pro: 7.7 g/dL / ALK PHOS: 80 U/L / ALT: 11 U/L / AST: 25 U/L / GGT: x               Diagnostic Studies: see sunrise for full report         HPI:  DIMITRY BRAUN is a 87 year old female with history of HTN, HLD, AF on Xarelto, stage 1 rectal cancer s/p EMR in 2015 [no further surgery or chemoradiation], and recent admission for hematochezia and colitis who presents with BRBPR x1.    Patient recently hospitalized for intermittent hematochezia, found to have colitis on CT imaging, and underwent colonoscopy on 11/28/2022 which revealed hemorrhoids, congested friable mucosa with ulcerations in the sigmoid colon c/w colitis [path revealed active colitis with cryptitis].  Patient just seen by outpatient GI Dr. Brunner on 12/8/2022, and then presents to [facility] ED following one episode of BRBPR.  She has chronic issues with hemorrhoids and constipation.  She remains on Xarelto.  Daughter at bedside able to provide HPI.    ROS:   14-point ROS reviewed and negative except as per HPI above    PMHX/PSHX:    Atrial fibrillation    UTI (urinary tract infection)    Hypothyroidism    Colon cancer    Hemorrhoids    HLD (hyperlipidemia)    History of cholecystectomy    Perforated appendicitis    History of cataract surgery      Allergies:  No Known Allergies      Home Medications: reviewed  Hospital Medications:  acetaminophen     Tablet .. 650 milliGRAM(s) Oral every 6 hours PRN  ascorbic acid 500 milliGRAM(s) Oral two times a day  aspirin enteric coated 81 milliGRAM(s) Oral daily  atorvastatin 10 milliGRAM(s) Oral at bedtime  furosemide    Tablet 20 milliGRAM(s) Oral daily  methimazole 5 milliGRAM(s) Oral daily  multivitamin 1 Tablet(s) Oral daily  rivaroxaban 20 milliGRAM(s) Oral with dinner  sotalol. 120 milliGRAM(s) Oral every 24 hours      Social History:   Tobacco: denies  Alcohol: denies  Recreational drugs: denies    Family history:  No pertinent FH in first degree relatives    PHYSICAL EXAM:   Vital Signs:  Vital Signs Last 24 Hrs  T(C): 36.9 (09 Dec 2022 09:22), Max: 38.2 (08 Dec 2022 23:00)  T(F): 98.5 (09 Dec 2022 09:22), Max: 100.7 (08 Dec 2022 23:00)  HR: 82 (09 Dec 2022 09:22) (71 - 89)  BP: 110/64 (09 Dec 2022 09:22) (104/60 - 133/90)  BP(mean): --  RR: 23 (09 Dec 2022 09:22) (16 - 23)  SpO2: 98% (09 Dec 2022 09:22) (96% - 100%)    Parameters below as of 09 Dec 2022 09:22  Patient On (Oxygen Delivery Method): room air      Daily Height in cm: 134.62 (09 Dec 2022 09:26)    Daily     GENERAL: no acute distress, appears stated age  HENT: NCAT; MMM  EYES: anicteric sclerae, moist conjunctivae; no lid-lag  NECK: Trachea midline; FROM  CHEST: no respiratory distress, no accessory muscle use  CARDIAC: regular rate, +S1/S2  ABDOMEN: soft, nontender, no rebound or guarding  RECTAL: chaperoned, hemorrhoids, empty rectal vault, no bleeding present  EXTREMITIES: warm, well perfused   SKIN: Normal temperature, turgor and texture; no rash on exposed skin  PSYCH: Calm, awake, alert  NEURO: No tremor, asterixis     LABS: reviewed                        9.8    8.47  )-----------( 191      ( 09 Dec 2022 00:50 )             31.7     12-09    130<L>  |  91<L>  |  14  ----------------------------<  117<H>  3.7   |  29  |  0.75    Ca    8.9      09 Dec 2022 00:50  Mg     1.80     12-09    TPro  7.7  /  Alb  3.4  /  TBili  0.3  /  DBili  x   /  AST  25  /  ALT  11  /  AlkPhos  80  12-09    LIVER FUNCTIONS - ( 09 Dec 2022 00:50 )  Alb: 3.4 g/dL / Pro: 7.7 g/dL / ALK PHOS: 80 U/L / ALT: 11 U/L / AST: 25 U/L / GGT: x               Diagnostic Studies: see sunrise for full report

## 2022-12-09 NOTE — CONSULT NOTE ADULT - ATTENDING COMMENTS
Patient presented with daughter for recurrent rectal bleeding. Per daughter, scant amount of blood seen. Currently without blood on VALE, but with hemorrhoids again noted. Patient recently admitted at which time GI was consulted for rectal bleeding and CT findings of colitis; she underwent colonoscopy 11/28 with hemorrhoids as well as sigmoid colitis (path non-specific acute inflammation). On this presentation, Hgb remains stable to prior.     --No plan for repeat endoscopic evaluation  --Ensure adequate bowel regimen, fiber supplementation  --Can trial topical therapy for hemorrhoids  --Follow up with Dr. Brunner (seen yesterday by patient)    Additional recommendations as above

## 2022-12-09 NOTE — H&P ADULT - NSHPPHYSICALEXAM_GEN_ALL_CORE
Vital Signs Last 24 Hrs  T(C): 36.9 (09 Dec 2022 09:22), Max: 38.2 (08 Dec 2022 23:00)  T(F): 98.5 (09 Dec 2022 09:22), Max: 100.7 (08 Dec 2022 23:00)  HR: 82 (09 Dec 2022 09:22) (71 - 89)  BP: 110/64 (09 Dec 2022 09:22) (104/60 - 133/90)  BP(mean): --  RR: 23 (09 Dec 2022 09:22) (16 - 23)  SpO2: 98% (09 Dec 2022 09:22) (96% - 100%)    Parameters below as of 09 Dec 2022 09:22  Patient On (Oxygen Delivery Method): room air

## 2022-12-09 NOTE — PATIENT PROFILE ADULT - FALL HARM RISK - HARM RISK INTERVENTIONS

## 2022-12-09 NOTE — H&P ADULT - ASSESSMENT
88 yo female with PMHx of A. fibb (on aspirin and Xarelto), perforated appendicitis (1992), cataract surgery, cholecystectomy, hemorrhoids, colitis, resolved UTI, presents to the ED with blood in bowel movement in the setting of influenza A infection.  88 y/o female, with a PmHx of Afib (on aspirin and Xarelto), perforated appendicitis (1992), cataract surgery, cholecystectomy, hemorrhoids, colitis, recent GI bleed, presents to the [facility] ED with blood in bowel movement. She is being admitted to medicine for r/o acute GI bleed and found to have influenza AH3 infection.

## 2022-12-09 NOTE — H&P ADULT - PROBLEM SELECTOR PLAN 5
Monitor H/H levels Q6  Initiate ferrous sulfate with ascorbic acid  Anemia work up (iron studies, B12, folate, reticulocyte)  Assess for change in mental status - cont methimazole  - monitor tft's

## 2022-12-09 NOTE — H&P ADULT - NSICDXPASTMEDICALHX_GEN_ALL_CORE_FT
PAST MEDICAL HISTORY:  Atrial fibrillation     Colon cancer     Hemorrhoids     HLD (hyperlipidemia)     Hypothyroidism     UTI (urinary tract infection)

## 2022-12-09 NOTE — H&P ADULT - PROBLEM SELECTOR PLAN 8
DASH diet 1800  Maintain food diet that has fiber DASH diet 1800  Maintain food diet that has fiber  - GI c/s following - daughter reporting recent dysphagia  - speech and swallow ordered

## 2022-12-09 NOTE — H&P ADULT - CONVERSATION DETAILS
As per daughter/ HCP Laura Sanderson, pt has made her wishes known and would like tp be DNR/DNI . pt does have paperwork completed for HCP but did not sign a MOLST.     Completed MOLST form with daughter.

## 2022-12-09 NOTE — H&P ADULT - NEGATIVE GENERAL GENITOURINARY SYMPTOMS
no incontinence/no dysuria/no urinary hesitancy/normal urinary frequency no flank pain L/no flank pain R/no incontinence/no dysuria/no urinary hesitancy/normal urinary frequency

## 2022-12-09 NOTE — CONSULT NOTE ADULT - ASSESSMENT
87 year old female with history of HTN, HLD, constipation, AF on Xarelto, stage 1 rectal cancer s/p EMR in 2015 [no further surgery or chemoradiation], and recent admission for hematochezia and colitis who presents with BRBPR x1.  Patient recently hospitalized for intermittent hematochezia, found to have colitis on CT imaging, and underwent colonoscopy on 11/28/2022 which revealed hemorrhoids, congested friable mucosa with ulcerations in the sigmoid colon c/w colitis [path revealed active colitis with cryptitis].  Patient just seen by outpatient GI Dr. Brunner on 12/8/2022, and then presents to [facility] ED following one episode of BRBPR, however other than hemorrhoids, rectal exam unremarkable.    # Intermittent hematochezia  # Constipation  # Hemorrhoids  # Recent colitis on imaging and recent colonoscopy    Recommendations:  -trend vitals, CBC, and monitor for clinical signs of bleeding  -maintain active type and screen  -transfusion goal to maintain hemoglobin >/= 7.0 and platelets >/= 50  -avoid NSAIDs  -minimize further risk for constipation by treating with standing dose of daily miralax [can uptitrate dose as needed until having regular, daily bowel movements] +/- enemas for symptomatic relief  -consider empiric treatment of hemorrhoids with topical therapy  -no acute indication for endoscopy or colonoscopy    Note incomplete until finalized by attending signature/attestation.    Rubens Burleson  GI/Hepatology Fellow    MONDAY-FRIDAY 8AM-5PM:  Pager# [phone] ([facility]) or [phone] ([facility])    NON-URGENT CONSULTS:  Please email [email or web address]  AT NIGHT AND ON WEEKENDS:  Contact on-call GI fellow via answering service ([phone]) from 5pm-8am and on weekends/holidays

## 2022-12-09 NOTE — H&P ADULT - NEGATIVE MUSCULOSKELETAL SYMPTOMS
no joint swelling/no myalgia/no muscle weakness no joint swelling/no myalgia/no muscle weakness/no stiffness/no neck pain/no back pain

## 2022-12-09 NOTE — H&P ADULT - NSICDXPASTSURGICALHX_GEN_ALL_CORE_FT
PAST SURGICAL HISTORY:  History of cataract surgery     History of cholecystectomy     Perforated appendicitis

## 2022-12-09 NOTE — ED ADULT NURSE REASSESSMENT NOTE - NS ED NURSE REASSESS COMMENT FT1
pt clean, changed and repositioned. 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 unblanchable redness noted to sacrum. wound dressed at this time.
unable to obtain [facility] lab work. CUAUHTEMOC thakkar made aware.
Report received from laisha RN. Pt resting comfortably in stretcher. As per daughter pt denies cp, SOB, N/V, N/T, abdominal pain, dizziness. Breathing even, unlabored, no signs of distress. Pt pending CT and xray. Daughter at bedside. Fall precautions in place. Will continue to monitor.

## 2022-12-09 NOTE — H&P ADULT - NEGATIVE OPHTHALMOLOGIC SYMPTOMS
no pain L/no pain R/no loss of vision L/no loss of vision R no blurred vision L/no blurred vision R/no pain L/no pain R/no loss of vision L/no loss of vision R

## 2022-12-09 NOTE — H&P ADULT - ITE SK HX ROS MEA POS PC
rash was described on left buttock (being managed at home), Slight ulceration on the coccygeal region (being managed of at home)

## 2022-12-09 NOTE — H&P ADULT - PROBLEM SELECTOR PLAN 7
DASH diet 1800  Monitor Blood pressure levels Q6 DASH diet 1800  - fasting lipid profile, cont statin

## 2022-12-09 NOTE — H&P ADULT - PROBLEM SELECTOR PLAN 9
Initiate Pneumatic Compression device management. Already on Xarelto, not needed - r/o PVD (unknown if has history or not)  - has cool bilateral LE's at the level of her ankles but is warm above that locations  - B/L LE US ordered to r/o dvt's (is for the most part bed ridden)  - cont Xarelto and aspirin  - consider ny/pvr and arterial dopplers  - was able to feel a pulse

## 2022-12-09 NOTE — H&P ADULT - PROBLEM SELECTOR PLAN 6
Continue Methimazole  Monitor (TSH, T3, T4) levels Q6 Monitor H/H levels Q6  Initiate ferrous sulfate with ascorbic acid  Anemia work up (tibc, B12, folate, reticulocyte, ferritin)

## 2022-12-09 NOTE — H&P ADULT - PROBLEM SELECTOR PLAN 2
Supportive therapy  Initiate Tylenol for fever management  Monitor INR and PT before Tylenol administration (00:50 PT- 30.8/INR 2.63)  Consider Oseltamivir administration - Supportive therapy  - Initiate Tylenol for fever management  - Consider Oseltamivir administration  - isolation precautions

## 2022-12-09 NOTE — H&P ADULT - MUSCULOSKELETAL
no calf tenderness/strength 5/5 bilateral upper extremities/strength 5/5 bilateral lower extremities details… calf tenderness/strength 5/5 bilateral upper extremities/strength 5/5 bilateral lower extremities

## 2022-12-09 NOTE — H&P ADULT - PROBLEM SELECTOR PLAN 3
EKG interpretation Atrial Fibrillation  serial EKG's every Q4 hours  Consider administration of Carvedilol EKG interpretation Atrial Fibrillation  CHADSVasc: 3  - rate control  - cont xarelto for now

## 2022-12-09 NOTE — H&P ADULT - NEGATIVE NEUROLOGICAL SYMPTOMS
no transient paralysis/no weakness/no generalized seizures/no syncope/no loss of sensation/no headache/no loss of consciousness

## 2022-12-09 NOTE — H&P ADULT - NEGATIVE CARDIOVASCULAR SYMPTOMS
no chest pain/no palpitations/no dyspnea on exertion/no orthopnea/no peripheral edema no chest pain/no palpitations/no dyspnea on exertion/no orthopnea

## 2022-12-09 NOTE — H&P ADULT - NEGATIVE PSYCHIATRIC SYMPTOMS
no increased sadness/no depression Finasteride Counseling:  I discussed with the patient the risks of use of finasteride including but not limited to decreased libido, decreased ejaculate volume, gynecomastia, and depression. Women should not handle medication.  All of the patient's questions and concerns were addressed. Finasteride Male Counseling: Finasteride Counseling:  I discussed with the patient the risks of use of finasteride including but not limited to decreased libido, decreased ejaculate volume, gynecomastia, and depression. Women should not handle medication.  All of the patient's questions and concerns were addressed.

## 2022-12-09 NOTE — H&P ADULT - GASTROINTESTINAL
details… mass palpable left of umbilicus/soft/nontender/nondistended/normal active bowel sounds/no guarding/no rigidity

## 2022-12-10 NOTE — DIETITIAN INITIAL EVALUATION ADULT - ORAL INTAKE PTA/DIET HISTORY
Met w/pt's daughter to obtain nutrition hx.  Pt is confused.  Pt lives with daughter.  Daughter states mother w/recent hospitalization and daughter prepared meals for her.  All foods were either mashed or shredded.  Swallowing issues started after Thanksgiving.  Pt w/recent hospital admission-RDN note reviewed.  Pt found w/severe malnutrition at that time.  Pt was receiving a Low Na Soft and Bite Sized diet on last admission.  Wt has been stable.  Pt w/o food allergies.  Pt is able to chew foods despite not having dentures.  Pt w/persistent constipation.

## 2022-12-10 NOTE — DIETITIAN INITIAL EVALUATION ADULT - OTHER INFO
86 y/o female, with a PmHx of Afib (on aspirin and Xarelto), perforated appendicitis (1992), cataract surgery, cholecystectomy, hemorrhoids, colitis, recent GI bleed, presents to the [facility] ED with blood in bowel movement. She is being admitted to medicine for r/o acute GI bleed and found to have influenza AH3 infection.

## 2022-12-10 NOTE — DIETITIAN INITIAL EVALUATION ADULT - NSFNSPHYEXAMSKINFT_GEN_A_CORE
Pressure Injury 1: sacrum, Stage III  Pressure Injury 2: none, none  Pressure Injury 3: none, none  Pressure Injury 4: none, none  Pressure Injury 5: none, none  Pressure Injury 6: none, none  Pressure Injury 7: none, none  Pressure Injury 8: none, none  Pressure Injury 9: none, none  Pressure Injury 10: none, none  Pressure Injury 11: none, none

## 2022-12-10 NOTE — PHYSICAL THERAPY INITIAL EVALUATION ADULT - PERTINENT HX OF CURRENT PROBLEM, REHAB EVAL
87 year old female presents to the [facility] ED with blood in bowel movement. She is being admitted to medicine for rule out acute GI bleed and found to have influenza AH3 infection.

## 2022-12-10 NOTE — DIETITIAN INITIAL EVALUATION ADULT - ADD RECOMMEND
1) Monitor weights, labs, BM's, skin integrity, p.o. intake; 2) Consider Dysphagia evaluation to determine appropriate food consistency to optimize oral food intake.  3) Continue MVI supplement to meet micronutrient needs for healing; 4) Assist and encourage food intake at meal times to aid in increasing nutritional intake.

## 2022-12-10 NOTE — DIETITIAN INITIAL EVALUATION ADULT - SIGNS/SYMPTOMS
as evidenced by pt w/Stage IV sacral pressure injury as evidenced by loss of muscle mass and fat stores, + edema

## 2022-12-10 NOTE — DIETITIAN INITIAL EVALUATION ADULT - ETIOLOGY
related to pt meets criteria for severe malnutrition in the context of acute illness related to increased need for healing

## 2022-12-10 NOTE — DIETITIAN INITIAL EVALUATION ADULT - PERTINENT MEDS FT
MEDICATIONS  (STANDING):  ascorbic acid 500 milliGRAM(s) Oral two times a day  aspirin enteric coated 81 milliGRAM(s) Oral daily  atorvastatin 10 milliGRAM(s) Oral at bedtime  dibucaine 1% Ointment 1 Application(s) Topical daily  furosemide    Tablet 20 milliGRAM(s) Oral daily  methimazole 5 milliGRAM(s) Oral daily  multivitamin 1 Tablet(s) Oral daily  polyethylene glycol 3350 17 Gram(s) Oral two times a day  rivaroxaban 15 milliGRAM(s) Oral daily  sotalol. 120 milliGRAM(s) Oral every 24 hours    MEDICATIONS  (PRN):  acetaminophen     Tablet .. 650 milliGRAM(s) Oral every 6 hours PRN Temp greater or equal to 38C (100.4F), Mild Pain (1 - 3), Moderate Pain (4 - 6)

## 2022-12-10 NOTE — PROGRESS NOTE ADULT - SUBJECTIVE AND OBJECTIVE BOX
[facility] Division of Hospital Medicine  Shola TolbertDO  Pager (M-F, 8A-5P): [phone]      Patient is a 87y old  Female who presents with a chief complaint of Influenza A infection with exacerbation of Heart failure (09 Dec 2022 11:25)      SUBJECTIVE / OVERNIGHT EVENTS: no overnight events   ADDITIONAL REVIEW OF SYSTEMS:    MEDICATIONS  (STANDING):  ascorbic acid 500 milliGRAM(s) Oral two times a day  aspirin enteric coated 81 milliGRAM(s) Oral daily  atorvastatin 10 milliGRAM(s) Oral at bedtime  dibucaine 1% Ointment 1 Application(s) Topical daily  furosemide    Tablet 20 milliGRAM(s) Oral daily  methimazole 5 milliGRAM(s) Oral daily  multivitamin 1 Tablet(s) Oral daily  polyethylene glycol 3350 17 Gram(s) Oral daily  rivaroxaban 15 milliGRAM(s) Oral daily  sotalol. 120 milliGRAM(s) Oral every 24 hours    MEDICATIONS  (PRN):  acetaminophen     Tablet .. 650 milliGRAM(s) Oral every 6 hours PRN Temp greater or equal to 38C (100.4F), Mild Pain (1 - 3), Moderate Pain (4 - 6)      CAPILLARY BLOOD GLUCOSE        I&O's Summary    09 Dec 2022 07:01  -  10 Dec 2022 07:00  --------------------------------------------------------  IN: 100 mL / OUT: 300 mL / NET: -200 mL        PHYSICAL EXAM:  Vital Signs Last 24 Hrs  T(C): 36.8 (10 Dec 2022 04:45), Max: 37 (09 Dec 2022 21:02)  T(F): 98.2 (10 Dec 2022 04:45), Max: 98.6 (09 Dec 2022 21:02)  HR: 77 (10 Dec 2022 04:45) (65 - 100)  BP: 117/87 (10 Dec 2022 04:45) (106/58 - 141/58)  BP(mean): --  RR: 18 (10 Dec 2022 04:45) (18 - 22)  SpO2: 95% (10 Dec 2022 04:45) (95% - 98%)    Parameters below as of 10 Dec 2022 04:45  Patient On (Oxygen Delivery Method): room air      CONSTITUTIONAL: NAD, well-developed, well-groomed  EYES: EOMI; conjunctiva and sclera clear  ENMT: Moist oral mucosa, no pharyngeal injection or exudates; normal dentition  NECK: Supple, no palpable masses; no thyromegaly  RESPIRATORY: Normal respiratory effort; lungs are clear to auscultation bilaterally  CARDIOVASCULAR: Regular rate and rhythm, normal S1 and S2, no murmur/rub/gallop; No lower extremity edema; Peripheral pulses are 2+ bilaterally  ABDOMEN: Nontender to palpation, normoactive bowel sounds, no rebound/guarding; No hepatosplenomegaly  MUSKULOSKELETAL:  no clubbing or cyanosis of digits; no joint swelling or tenderness to palpation  PSYCH: A+O to person, place, and time; affect appropriate  NEUROLOGY: CN 2-12 are intact and symmetric; no gross sensory deficits;   SKIN: No rashes; no palpable lesions    LABS:                        11.2   13.12 )-----------( 153      ( 10 Dec 2022 06:45 )             36.2     12-10    137  |  96<L>  |  12  ----------------------------<  68<L>  3.9   |  24  |  0.64    Ca    8.6      10 Dec 2022 06:45  Mg     1.90     12-10    TPro  7.6  /  Alb  3.5  /  TBili  0.3  /  DBili  x   /  AST  31  /  ALT  13  /  AlkPhos  84  12-10    PT/INR - ( 09 Dec 2022 00:50 )   PT: 30.8 sec;   INR: 2.63 ratio         PTT - ( 09 Dec 2022 00:50 )  PTT:34.8 sec      Urinalysis Basic - ( 09 Dec 2022 06:35 )    Color: Yellow / Appearance: Clear / SG: >1.050 / pH: x  Gluc: x / Ketone: Negative  / Bili: Negative / Urobili: <2 mg/dL   Blood: x / Protein: Trace / Nitrite: Negative   Leuk Esterase: Negative / RBC: x / WBC x   Sq Epi: x / Non Sq Epi: x / Bacteria: x        Culture - Urine (collected 09 Dec 2022 06:35)  Source: Catheterized Catheterized  Final Report (10 Dec 2022 07:29):    <10,000 CFU/mL Normal Urogenital Alejandrina    Culture - Blood (collected 09 Dec 2022 01:03)  Source: .Blood Blood-Venous  Preliminary Report (10 Dec 2022 07:02):    No growth to date.    Culture - Blood (collected 09 Dec 2022 00:50)  Source: .Blood Blood-Peripheral  Preliminary Report (10 Dec 2022 07:02):    No growth to date.        RADIOLOGY & ADDITIONAL TESTS:  Results Reviewed:   Imaging Personally Reviewed:  Electrocardiogram Personally Reviewed:    COORDINATION OF CARE:  Care Discussed with Consultants/Other Providers [Y/N]: Y  Prior or Outpatient Records Reviewed [Y/N]: Y   [facility] of [facility]  Shola Tolbert DO  Pager (M-F, 8A-5P): [phone]      Patient is a 87y old  Female who presents with a chief complaint of Influenza A infection with exacerbation of Heart failure (09 Dec 2022 11:25)      SUBJECTIVE / OVERNIGHT EVENTS: no overnight events, pt seen at bedside, dgt, Laura present. Dgt reports pt has more color to her face today, a little more lively, was able to eat some breakfast this morning. No further bleeding.   ADDITIONAL REVIEW OF SYSTEMS: no cp/sob     MEDICATIONS  (STANDING):  ascorbic acid 500 milliGRAM(s) Oral two times a day  aspirin enteric coated 81 milliGRAM(s) Oral daily  atorvastatin 10 milliGRAM(s) Oral at bedtime  dibucaine 1% Ointment 1 Application(s) Topical daily  furosemide    Tablet 20 milliGRAM(s) Oral daily  methimazole 5 milliGRAM(s) Oral daily  multivitamin 1 Tablet(s) Oral daily  polyethylene glycol 3350 17 Gram(s) Oral daily  rivaroxaban 15 milliGRAM(s) Oral daily  sotalol. 120 milliGRAM(s) Oral every 24 hours    MEDICATIONS  (PRN):  acetaminophen     Tablet .. 650 milliGRAM(s) Oral every 6 hours PRN Temp greater or equal to 38C (100.4F), Mild Pain (1 - 3), Moderate Pain (4 - 6)      CAPILLARY BLOOD GLUCOSE        I&O's Summary    09 Dec 2022 07:01  -  10 Dec 2022 07:00  --------------------------------------------------------  IN: 100 mL / OUT: 300 mL / NET: -200 mL        PHYSICAL EXAM:  Vital Signs Last 24 Hrs  T(C): 36.8 (10 Dec 2022 04:45), Max: 37 (09 Dec 2022 21:02)  T(F): 98.2 (10 Dec 2022 04:45), Max: 98.6 (09 Dec 2022 21:02)  HR: 77 (10 Dec 2022 04:45) (65 - 100)  BP: 117/87 (10 Dec 2022 04:45) (106/58 - 141/58)  BP(mean): --  RR: 18 (10 Dec 2022 04:45) (18 - 22)  SpO2: 95% (10 Dec 2022 04:45) (95% - 98%)    Parameters below as of 10 Dec 2022 04:45  Patient On (Oxygen Delivery Method): room air      CONSTITUTIONAL: NAD, frail, elderly  HEENT: EOMI, temporal wasting, MMM  RESPIRATORY: Normal respiratory effort; lungs are clear to auscultation bilaterally  CARDIOVASCULAR: Regular rate and rhythm, normal S1 and S2, no murmurs  ABDOMEN: Nontender to palpation, normoactive bowel sounds, soft   PSYCH: anxious, but cooperative w/dgt present   NEUROLOGY: CN 2-12 are intact and symmetric; nonfocal   SKIN: No rashes; no palpable lesions    LABS:                        11.2   13.12 )-----------( 153      ( 10 Dec 2022 06:45 )             36.2     12-10    137  |  96<L>  |  12  ----------------------------<  68<L>  3.9   |  24  |  0.64    Ca    8.6      10 Dec 2022 06:45  Mg     1.90     12-10    TPro  7.6  /  Alb  3.5  /  TBili  0.3  /  DBili  x   /  AST  31  /  ALT  13  /  AlkPhos  84  12-10    PT/INR - ( 09 Dec 2022 00:50 )   PT: 30.8 sec;   INR: 2.63 ratio         PTT - ( 09 Dec 2022 00:50 )  PTT:34.8 sec      Urinalysis Basic - ( 09 Dec 2022 06:35 )    Color: Yellow / Appearance: Clear / SG: >1.050 / pH: x  Gluc: x / Ketone: Negative  / Bili: Negative / Urobili: <2 mg/dL   Blood: x / Protein: Trace / Nitrite: Negative   Leuk Esterase: Negative / RBC: x / WBC x   Sq Epi: x / Non Sq Epi: x / Bacteria: x        Culture - Urine (collected 09 Dec 2022 06:35)  Source: Catheterized Catheterized  Final Report (10 Dec 2022 07:29):    <10,000 CFU/mL Normal Urogenital Alejandrina    Culture - Blood (collected 09 Dec 2022 01:03)  Source: .Blood Blood-Venous  Preliminary Report (10 Dec 2022 07:02):    No growth to date.    Culture - Blood (collected 09 Dec 2022 00:50)  Source: .Blood Blood-Peripheral  Preliminary Report (10 Dec 2022 07:02):    No growth to date.        RADIOLOGY & ADDITIONAL TESTS:      COORDINATION OF CARE:  Care Discussed with Consultants/Other Providers [Y/N]: Y  Prior or Outpatient Records Reviewed [Y/N]: Y

## 2022-12-10 NOTE — DIETITIAN INITIAL EVALUATION ADULT - PERTINENT LABORATORY DATA
12-10    137  |  96<L>  |  12  ----------------------------<  68<L>  3.9   |  24  |  0.64    Ca    8.6      10 Dec 2022 06:45  Mg     1.90     12-10    TPro  7.6  /  Alb  3.5  /  TBili  0.3  /  DBili  x   /  AST  31  /  ALT  13  /  AlkPhos  84  12-10  POCT Blood Glucose.: 117 mg/dL (12-10-22 @ 12:58)  A1C with Estimated Average Glucose Result: 5.4 % (12-09-22 @ 00:50)

## 2022-12-10 NOTE — DIETITIAN INITIAL EVALUATION ADULT - ORAL NUTRITION SUPPLEMENTS
Consider Ensure Plus HP (provides 350kcal w/20gm protein per 250mL serving) 2x/d between meals to aid in increasing kcal/protein intake

## 2022-12-11 NOTE — PROGRESS NOTE ADULT - PROBLEM SELECTOR PLAN 4
chronic
EKG interpretation Atrial Fibrillation  CHADSVasc: 3  - rate control  - cont xarelto for now

## 2022-12-11 NOTE — PROGRESS NOTE ADULT - NUTRITIONAL ASSESSMENT
This patient has been assessed with a concern for Malnutrition and has been determined to have a diagnosis/diagnoses of Severe protein-calorie malnutrition.    This patient is being managed with:   Diet Minced and Moist-  DASH/TLC {Sodium & Cholesterol Restricted} (DASH)  Entered: Dec 11 2022  9:58AM

## 2022-12-11 NOTE — SWALLOW BEDSIDE ASSESSMENT ADULT - SWALLOW EVAL: DIAGNOSIS
1- Mild oral stage for puree, minced and moist solids, and thin liquids marked by reduced stripping from utensil, adequate oral containment, adequate manipulation with mildly prolonged mastication, adequate anterior to posterior transfer/transit. 2- Mild to Moderate oral stage for soft and bite sized solids marked by adequate oral containment, prolonged manipulation and prolonged mastication resulting in delayed anterior to posterior transfer/transit with mild oral residue noted and cleared with thin liquid wash. 3- Functional oral stage for puree, minced and moist solids, soft and bite sized solids, and thin liquids marked by initiation of pharyngeal swallow trigger and hyolaryngeal excursion upon palpation. No overt sign or symptoms of aspiration noted across PO consistencies. 1- Mild oral stage for puree, minced and moist solids, and thin liquids marked by reduced stripping from utensil, adequate oral containment, adequate manipulation with mildly prolonged mastication, adequate anterior to posterior transfer/transit. 2- Moderate to Severe oral stage for soft and bite sized solids marked by adequate oral containment, prolonged manipulation and prolonged mastication resulting in delayed anterior to posterior transfer/transit with mild oral residue noted and cleared with thin liquid wash. 3- Functional oral stage for puree, minced and moist solids, soft and bite sized solids, and thin liquids marked by initiation of pharyngeal swallow trigger and hyolaryngeal excursion upon palpation. No overt sign or symptoms of aspiration noted across PO consistencies.

## 2022-12-11 NOTE — PROGRESS NOTE ADULT - PROBLEM SELECTOR PLAN 2
7/15/2020         RE: Stephanie Trejo   [address]  [facility] [id number]        Dear Colleague,    Thank you for referring your patient, Stephanie Trejo, to the [facility]. Please see a copy of my visit note below.    Stephanie Trejo is a 62 year old year old female patient here today for spots on right arm. She notes she has a spot that is also itchy for the past few months. She also notes older mole that she was recently told to have looked at by her mother's dermatologist. She denies any changes except some itching.  Patient has no other skin complaints today.  Remainder of the HPI, Meds, PMH, Allergies, FH, and SH was reviewed in chart.    Pertinent Hx:   No personal history of skin cancer.   Past Medical History:   Diagnosis Date     CHF (congestive heart failure) (H) 2013     Depressive disorder      Hypertension      Mitral valve insufficiency, acquired      Other and unspecified disc disorder of lumbar region      Scoliosis (and kyphoscoliosis), idiopathic      Undiagnosed cardiac murmurs      Unspecified hypothyroidism        Past Surgical History:   Procedure Laterality Date     BACK SURGERY       COLONOSCOPY       COLONOSCOPY N/A 2020    Procedure: COLONOSCOPY;  Surgeon: Shaun Torrez MD;  Location: [facility]     GYN SURGERY       SOFT TISSUE SURGERY       SURGICAL HISTORY OF -            SURGICAL HISTORY OF -       Laparoscopy     SURGICAL HISTORY OF -       Hernia Repair      SURGICAL HISTORY OF -       Back Fusion T3 - L3        Family History   Problem Relation Age of Onset     Lipids Mother      Thyroid Disease Mother      Eye Disorder Mother         macular degeneration     Colon Cancer Mother      Anesthesia Reaction Mother         allergic to some     Hypertension Father      Cardiovascular Father         MI     Osteoporosis Father      Alzheimer Disease Father      Genitourinary Problems Father         incontinence     Breast Cancer 
Maternal Grandmother      Cerebrovascular Disease Maternal Grandfather      Diabetes Paternal Grandmother      Gastrointestinal Disease Sister         colitis     Respiratory Daughter         asthma       Social History     Socioeconomic History     Marital status:      Spouse name: Not on file     Number of children: Not on file     Years of education: Not on file     Highest education level: Not on file   Occupational History     Not on file   Social Needs     Financial resource strain: Not on file     Food insecurity     Worry: Not on file     Inability: Not on file     Transportation needs     Medical: Not on file     Non-medical: Not on file   Tobacco Use     Smoking status: Never Smoker     Smokeless tobacco: Never Used   Substance and Sexual Activity     Alcohol use: No     Alcohol/week: 0.0 standard drinks     Drug use: No     Sexual activity: Yes     Partners: Male     Birth control/protection: Post-menopausal   Lifestyle     Physical activity     Days per week: Not on file     Minutes per session: Not on file     Stress: Not on file   Relationships     Social connections     Talks on phone: Not on file     Gets together: Not on file     Attends Hindu service: Not on file     Active member of club or organization: Not on file     Attends meetings of clubs or organizations: Not on file     Relationship status: Not on file     Intimate partner violence     Fear of current or ex partner: Not on file     Emotionally abused: Not on file     Physically abused: Not on file     Forced sexual activity: Not on file   Other Topics Concern     Parent/sibling w/ CABG, MI or angioplasty before 65F 55M? No   Social History Narrative     Not on file       Outpatient Encounter Medications as of 7/15/2020   Medication Sig Dispense Refill     albuterol (PROAIR HFA/PROVENTIL HFA/VENTOLIN HFA) 108 (90 Base) MCG/ACT inhaler Inhale 2 puffs into the lungs every 6 hours as needed for shortness of breath / dyspnea or 
"wheezing 1 Inhaler 0     aspirin 81 MG chewable tablet Take 1 tablet by mouth daily. 90 tablet      hydrochlorothiazide (HYDRODIURIL) 25 MG tablet TAKE 1 TABLET(25 MG) BY MOUTH DAILY 90 tablet 1     levothyroxine (SYNTHROID/LEVOTHROID) 125 MCG tablet TAKE 1 TABLET(125 MCG) BY MOUTH DAILY (Patient taking differently: Take 125 mcg by mouth daily ) 90 tablet 3     losartan (COZAAR) 50 MG tablet Take 1 tablet (50 mg) by mouth daily 180 tablet 3     temazepam (RESTORIL) 15 MG capsule TAKE 1-2 CAPSULES BY MOUTH NIGHTLY AS NEEDED FOR SLEEP 60 capsule 5     buPROPion (WELLBUTRIN XL) 150 MG 24 hr tablet TAKE 1 TABLET(150 MG) BY MOUTH DAILY (Patient not taking: Reported on 7/15/2020) 90 tablet 1     omeprazole (PRILOSEC) 20 MG DR capsule Take 1 capsule (20 mg) by mouth daily (Patient not taking: Reported on 7/15/2020) 60 capsule 0     [DISCONTINUED] omeprazole (PRILOSEC) 20 MG DR capsule Take 1 capsule (20 mg) by mouth daily for 14 days 14 capsule 0     No facility-administered encounter medications on file as of 7/15/2020.              Review Of Systems  Skin: As above  Eyes: negative  Ears/Nose/Throat: negative  Respiratory: No shortness of breath, dyspnea on exertion, cough, or hemoptysis  Cardiovascular: negative  Gastrointestinal: negative  Genitourinary: negative  Musculoskeletal: negative  Neurologic: negative  Psychiatric: negative  Hematologic/Lymphatic/Immunologic: negative  Endocrine: negative      O:   NAD, WDWN, Alert & Oriented, Mood & Affect wnl, Vitals stable   Here today alone   Pulse 54   Ht 1.803 m (5' 11\")   LMP 09/21/2012   SpO2 98%   BMI 26.78 kg/m     General appearance normal   Vitals stable   Alert, oriented and in no acute distress     0.5 cm brown thin brown papule with tail on right inferior elbow   0.4 cm pink scabbed macule on right forearm   Stuck on papules and brown macules on trunk and ext   Red papules on trunk  Brown papules and macules with regular pigment network on torso and "
extremities      The remainder of skin exam is normal       Eyes: Conjunctivae/lids:Normal     ENT: Lips, buccal mucosa, tongue: normal    MSK:Normal    Cardiovascular: peripheral edema none    Pulm: Breathing Normal    Lymph Nodes: No Head and Neck Lymphadenopathy     Neuro/Psych: Orientation:Alert and Orientedx3 ; Mood/Affect:normal   A/P:  1. R/O AK vs excoriation vs NMSC on right forearm  TANGENTIAL BIOPSY IN HOUSE:  After consent, anesthesia with LEC and prep, tangential excision performed.  No complications and routine wound care.    2. R/O atypical nevus on right inferior elbow   TANGENTIAL BIOPSY SENT OUT:  After consent, anesthesia with LEC and prep, tangential excision performed and specimen sent out for permanent section histology.  No complications and routine wound care. Patient told to call our office in 1-2 weeks for result.      3. Inflamed seborrheic keratoses on chest x  10  LN2:  Treated with LN2 for 5s for 1-2 cycles. Warned risks of blistering, pain, pigment change, scarring, and incomplete resolution.  Advised patient to return if lesions do not completely resolve.  Wound care sheet given.  4. Seborrheic keratosis, lentigo, angioma, benign nevi   BENIGN LESIONS DISCUSSED WITH PATIENT:  I discussed the specifics of tumor, prognosis, and genetics of benign lesions.  I explained that treatment of these lesions would be purely cosmetic and not medically neccessary.  I discussed with patient different removal options including excision, cautery and /or laser.      Nature and genetics of benign skin lesions dicussed with patient.  Signs and Symptoms of skin cancer discussed with patient.  ABCDEs of melanoma reviewed with patient.  Patient encouraged to perform monthly skin exams.  UV precautions reviewed with patient.  Risks of non-melanoma skin cancer discussed with patient   Return to clinic pending biopsy results.     Stephanie to follow up with Primary Care provider regarding elevated blood 
pressure.      Again, thank you for allowing me to participate in the care of your patient.        Sincerely,        Sandy Saleem PA-C    
- Supportive therapy  - Tamiflu started 12/10   - isolation precautions
- Supportive therapy  - Tamiflu started 12/10 -- 75mg loading dose, followed by 30mg BID x4 days   - isolation precautions

## 2022-12-11 NOTE — PROGRESS NOTE ADULT - PROBLEM SELECTOR PLAN 12
Already on Xarelto, not needed    DC home when ready, dgt declining rehab. Dgt, Laura, updated at bedside, 12/10 and 12/11     #ACP: Pt already DNR, discussed dc planning with dgt and possible hospice referral as dgt asking about inpt hospice and overall care when pts condition worsens.

## 2022-12-11 NOTE — SWALLOW BEDSIDE ASSESSMENT ADULT - COMMENTS
Progress Note- Hospitalist 12/10: "88 y/o female, with a PmHx of Afib (on aspirin and Xarelto), perforated appendicitis (1992), cataract surgery, cholecystectomy, hemorrhoids, colitis, recent GI bleed, presents to the [facility] ED with blood in bowel movement. She is being admitted to medicine for r/o acute GI bleed and found to have influenza AH3 infection."     CR Chest 12/9: Impression: Clear lungs.     Clearance for PO trials received from [facility] ([id number]) given concern for GI bleed prior to evaluation.     Patient seen at bedside awake to verbal/tactile stimuli during clinical swallow evaluation this AM. Patient is primarily Georgian speaking and is able to follow simple commands in English. [employer] utilized ([id number]). RN present at bedside during evaluation.

## 2022-12-11 NOTE — PROGRESS NOTE ADULT - ASSESSMENT
88 y/o female, with a PmHx of Afib (on aspirin and Xarelto), perforated appendicitis (1992), cataract surgery, cholecystectomy, hemorrhoids, colitis, recent GI bleed, presents to the [facility] ED with blood in bowel movement. She is being admitted to medicine for r/o acute GI bleed and found to have influenza AH3 infection. 
86 y/o female, with a PmHx of Afib (on aspirin and Xarelto), perforated appendicitis (1992), cataract surgery, cholecystectomy, hemorrhoids, colitis, recent GI bleed, presents to the [facility] ED with blood in bowel movement. She is being admitted to medicine for r/o acute GI bleed and found to have influenza AH3 infection.

## 2022-12-11 NOTE — PROGRESS NOTE ADULT - PROBLEM SELECTOR PLAN 1
rectal bleeding 2/2 hemorrhoids   12/9 CT Abd/Pelvis - no acute intra abdominal abnormality  - recent colonoscopy 11/28/22  - monitor H & H (stable)  - cont xarelto and aspirin at this time; if continues to bleed and H & H drop then consider holding them  - Seen by GI, suspect hemorrhoidal bleeding, recommending symptomatic management. no further scope  - Per RN, 2 soft BMs yesterday
rectal bleeding 2/2 hemorrhoids   12/9 CT Abd/Pelvis - no acute intra abdominal abnormality  - recent colonoscopy 11/28/22  - monitor H & H (stable at this time)  - cont xarelto and aspirin at this time; if continues to bleed and H & H drop then consider holding them  - Seen by GI, suspect hemorrhoidal bleeding, recommending symptomatic management. no further scope

## 2022-12-11 NOTE — PROGRESS NOTE ADULT - SUBJECTIVE AND OBJECTIVE BOX
[facility] Division of Hospital Medicine  Claudiacurtgurpreet TolbertDO  Pager (M-F, 8A-5P): [phone]      Patient is a 87y old  Female who presents with a chief complaint of Influenza with other respiratory manifestations, other influenza virus identified     (10 Dec 2022 16:04)      SUBJECTIVE / OVERNIGHT EVENTS: no overnight events, pt seen at bedside, resting comfortably. with poor PO intake this morning per daughter.   ADDITIONAL REVIEW OF SYSTEMS: unable to assess d/t dementia     MEDICATIONS  (STANDING):  ascorbic acid 500 milliGRAM(s) Oral two times a day  aspirin enteric coated 81 milliGRAM(s) Oral daily  atorvastatin 10 milliGRAM(s) Oral at bedtime  dibucaine 1% Ointment 1 Application(s) Topical daily  methimazole 5 milliGRAM(s) Oral daily  multivitamin 1 Tablet(s) Oral daily  oseltamivir 30 milliGRAM(s) Oral two times a day  polyethylene glycol 3350 17 Gram(s) Oral two times a day  rivaroxaban 15 milliGRAM(s) Oral daily  sodium chloride 0.9%. 1000 milliLiter(s) (50 mL/Hr) IV Continuous <Continuous>  sotalol. 120 milliGRAM(s) Oral every 24 hours    MEDICATIONS  (PRN):  acetaminophen     Tablet .. 650 milliGRAM(s) Oral every 6 hours PRN Temp greater or equal to 38C (100.4F), Mild Pain (1 - 3), Moderate Pain (4 - 6)      CAPILLARY BLOOD GLUCOSE      POCT Blood Glucose.: 122 mg/dL (11 Dec 2022 06:26)  POCT Blood Glucose.: 131 mg/dL (11 Dec 2022 00:42)  POCT Blood Glucose.: 147 mg/dL (10 Dec 2022 22:03)  POCT Blood Glucose.: 128 mg/dL (10 Dec 2022 17:10)  POCT Blood Glucose.: 117 mg/dL (10 Dec 2022 12:58)    I&O's Summary    10 Dec 2022 07:01  -  11 Dec 2022 07:00  --------------------------------------------------------  IN: 440 mL / OUT: 502 mL / NET: -62 mL    11 Dec 2022 07:01  -  11 Dec 2022 12:51  --------------------------------------------------------  IN: 240 mL / OUT: 0 mL / NET: 240 mL        PHYSICAL EXAM:  Vital Signs Last 24 Hrs  T(C): 36.8 (11 Dec 2022 08:50), Max: 36.8 (10 Dec 2022 22:25)  T(F): 98.3 (11 Dec 2022 08:50), Max: 98.3 (11 Dec 2022 08:50)  HR: 58 (11 Dec 2022 12:11) (58 - 83)  BP: 100/60 (11 Dec 2022 12:11) (100/55 - 113/83)  BP(mean): --  RR: 17 (11 Dec 2022 12:11) (17 - 18)  SpO2: 97% (11 Dec 2022 12:11) (95% - 97%)    Parameters below as of 11 Dec 2022 12:11  Patient On (Oxygen Delivery Method): room air        CONSTITUTIONAL: NAD, frail, elderly  HEENT: EOMI, temporal wasting, MMM  RESPIRATORY: Normal respiratory effort; lungs are clear to auscultation bilaterally  CARDIOVASCULAR: Regular rate and rhythm, normal S1 and S2, no murmurs  ABDOMEN: Nontender to palpation, normoactive bowel sounds, soft   PSYCH: anxious, but cooperative w/dgt present   NEUROLOGY: CN 2-12 are intact and symmetric; nonfocal   SKIN: No rashes; no palpable lesions    LABS:                        10.1   15.61 )-----------( 132      ( 11 Dec 2022 07:10 )             32.1     12-11    137  |  97<L>  |  18  ----------------------------<  108<H>  3.6   |  29  |  0.75    Ca    8.3<L>      11 Dec 2022 07:10  Mg     1.90     12-11    TPro  7.6  /  Alb  3.5  /  TBili  0.3  /  DBili  x   /  AST  31  /  ALT  13  /  AlkPhos  84  12-10              Culture - Urine (collected 09 Dec 2022 06:35)  Source: Catheterized Catheterized  Final Report (10 Dec 2022 07:29):    <10,000 CFU/mL Normal Urogenital Alejandrina    Culture - Blood (collected 09 Dec 2022 01:03)  Source: .Blood Blood-Venous  Preliminary Report (10 Dec 2022 07:02):    No growth to date.    Culture - Blood (collected 09 Dec 2022 00:50)  Source: .Blood Blood-Peripheral  Preliminary Report (10 Dec 2022 07:02):    No growth to date.        RADIOLOGY & ADDITIONAL TESTS:  Results Reviewed:   Imaging Personally Reviewed:  Electrocardiogram Personally Reviewed:    COORDINATION OF CARE:  Care Discussed with Consultants/Other Providers [Y/N]: Y  Prior or Outpatient Records Reviewed [Y/N]: Y

## 2022-12-11 NOTE — PROGRESS NOTE ADULT - REASON FOR ADMISSION
Influenza A infection with exacerbation of Heart failure
Influenza A infection with exacerbation of Heart failure

## 2022-12-11 NOTE — PROGRESS NOTE ADULT - PROBLEM SELECTOR PLAN 11
DASH diet 1800  Maintain food diet that has fiber  as above
Already on Xarelto, not needed    DC home when ready, dgt declining rehab

## 2022-12-11 NOTE — SWALLOW BEDSIDE ASSESSMENT ADULT - ASR SWALLOW REFERRAL
RD follow to ensure daily caloric nutritional intake, may benefit from oral supplements (e.g. Ensure)/Registered Dietitian

## 2022-12-11 NOTE — PROGRESS NOTE ADULT - PROBLEM SELECTOR PLAN 10
- r/o PVD (unknown if has history or not)  - LE duplex negative   - cont Xarelto and aspirin
DASH diet 1800  Maintain food diet that has fiber  as above

## 2022-12-11 NOTE — SWALLOW BEDSIDE ASSESSMENT ADULT - ASR SWALLOW RECOMMEND DIAG
Objective testing not warranted at this time given no overt signs of aspiration across PO trials and recent chest imaging 12/9 indicating clear lungs.

## 2022-12-11 NOTE — PROGRESS NOTE ADULT - PROBLEM SELECTOR PLAN 6
Monitor H/H levels Q6  Initiate ferrous sulfate with ascorbic acid  Anemia work up (tibc, B12, folate, reticulocyte, ferritin)
- cont methimazole  - monitor tft's

## 2022-12-11 NOTE — PROGRESS NOTE ADULT - PROBLEM SELECTOR PLAN 9
- r/o PVD (unknown if has history or not)  - LE duplex negative   - cont Xarelto and aspirin
- daughter reporting recent dysphagia  - seen by speech and swallow

## 2022-12-11 NOTE — PROGRESS NOTE ADULT - PROBLEM SELECTOR PLAN 3
EKG interpretation Atrial Fibrillation  CHADSVasc: 3  - rate control  - cont xarelto for now
seen by nutrition

## 2022-12-11 NOTE — PROGRESS NOTE ADULT - PROBLEM SELECTOR PLAN 7
DASH diet 1800  - fasting lipid profile, cont statin
Monitor H/H levels Q6  Initiate ferrous sulfate with ascorbic acid

## 2022-12-11 NOTE — PROGRESS NOTE ADULT - PROBLEM SELECTOR PLAN 8
DASH diet 1800  - fasting lipid profile, cont statin
- daughter reporting recent dysphagia  - speech and swallow ordered

## 2022-12-12 NOTE — DISCHARGE NOTE PROVIDER - HOSPITAL COURSE
86 y/o female, with a PmHx of Afib (on aspirin and Xarelto), perforated appendicitis (1992), cataract surgery, cholecystectomy, hemorrhoids, colitis, recent GI bleed, presents to the [facility] ED with blood in bowel movement and sepsis 2/2 Influenza AH3 (tachypnea to 22 and temp 100.7). Pt seen by GI, suspect hemorrhoidal bleeding which resolved, Hb stable. No further GI interventions. Xarelto and ASA continued throughout admission.   Pt treated with Tamiflu for influenza infection.   Seen by wound care for sacral decub.  Severe protein-calorie malnutrition- seen by nutrition    Pt optimized for discharged, updated dgt Laura at bedside daily.

## 2022-12-12 NOTE — DISCHARGE NOTE PROVIDER - CARE PROVIDER_API CALL
Brunner, Robert J (MD)  Gastroenterology  [address] [facility], [address]nd [place] [address]  Phone: [phone]  Fax: [phone]  Established Patient  Follow Up Time:

## 2022-12-12 NOTE — DISCHARGE NOTE PROVIDER - NSDCCPCAREPLAN_GEN_ALL_CORE_FT
PRINCIPAL DISCHARGE DIAGNOSIS  Diagnosis: Sepsis  Assessment and Plan of Treatment: You were admitted to [facility] for influenza, you were treated with tamiflu and your symptoms improved. Finish course of Tamiflu      SECONDARY DISCHARGE DIAGNOSES  Diagnosis: Influenza in adult  Assessment and Plan of Treatment: Finish course of Tamiflu    Diagnosis: GIB (gastrointestinal bleeding)  Assessment and Plan of Treatment: You were admitted for rectal bleeding. Your blood count was stable. You were seen by GI and they think the bleeding was form hemorrhoids. Continue the ointment to reduce inflammation around hemorrhoids and to prevent bleeding. Continue Miralax two times a day to help soften bowel movements and followup with Dr. Brunner.    Diagnosis: Hemorrhoids  Assessment and Plan of Treatment: Continue to use Nupercainal, the hemorrhoid cream daily.    Diagnosis: Atrial fibrillation  Assessment and Plan of Treatment: Persistent atrial fibrillation. Continue Xarelto    Diagnosis: HF (heart failure)  Assessment and Plan of Treatment: Lasix was held in setting of dehydration. You can resume your home dose on 12/13.    Diagnosis: Hyperthyroidism  Assessment and Plan of Treatment: Continue methimazole    Diagnosis: Dysphagia  Assessment and Plan of Treatment: There was concern for difficulty swallowing. You were seen by speech and swallow therapists and they recommended Minced and Moist Solids with Thin Liquids.      Diagnosis: Advanced care planning/counseling discussion  Assessment and Plan of Treatment: Goals of care discussion done by medical team. Pt DNR/DNI, JOHNSON completed this admission.    Diagnosis: Severe protein-calorie malnutrition  Assessment and Plan of Treatment: Seen by nutrition     PRINCIPAL DISCHARGE DIAGNOSIS  Diagnosis: Sepsis  Assessment and Plan of Treatment: You were admitted to [facility] for influenza, you were treated with tamiflu and your symptoms improved. Finish course of Tamiflu      SECONDARY DISCHARGE DIAGNOSES  Diagnosis: Influenza in adult  Assessment and Plan of Treatment: Finish course of Tamiflu    Diagnosis: GIB (gastrointestinal bleeding)  Assessment and Plan of Treatment: You were admitted for rectal bleeding. Your blood count was stable. You were seen by GI and they think the bleeding was form hemorrhoids. Continue the ointment to reduce inflammation around hemorrhoids and to prevent bleeding. Continue Miralax two times a day to help soften bowel movements and followup with Dr. Brunner.    Diagnosis: Hemorrhoids  Assessment and Plan of Treatment: Continue to use Nupercainal, the hemorrhoid cream daily.    Diagnosis: Atrial fibrillation  Assessment and Plan of Treatment: Persistent atrial fibrillation. Continue Xarelto    Diagnosis: HF (heart failure)  Assessment and Plan of Treatment: Lasix was held in setting of dehydration. You can resume your home dose on 12/13.    Diagnosis: Hyperthyroidism  Assessment and Plan of Treatment: Continue methimazole    Diagnosis: Dysphagia  Assessment and Plan of Treatment: There was concern for difficulty swallowing. You were seen by speech and swallow therapists and they recommended Minced and Moist Solids with Thin Liquids.      Diagnosis: Advanced care planning/counseling discussion  Assessment and Plan of Treatment: Goals of care discussion done by medical team. Pt DNR/DNI, JOHNSON completed this admission.    Diagnosis: Severe protein-calorie malnutrition  Assessment and Plan of Treatment: Seen by nutrition    Diagnosis: Sacral decubitus ulcer  Assessment and Plan of Treatment: Seen by wound care, outpatient wound care services to be reinstated.

## 2022-12-12 NOTE — CONSULT NOTE ADULT - ASSESSMENT
87y old  Female w/ Influenza A infection with exacerbation of Heart failure Welsh speaking female (info obtained from daughter at bedside), with a PmHx of Afib (on aspirin and Xarelto), perforated appendicitis (1992), cataract surgery, cholecystectomy, hemorrhoids, colitis, presents to the ED with blood in bowel movement. admitted to [facility] on 11/28 for a GI bleed in which she was seen by GI and had a colonoscopy.    had a fever last note and that patients granddaughter (whom lives with them has been sick as well).    possible GI bleed with an incidental finding of Influenza AH3 positive.   no rectal blood,  no red blood in stool    aquacel  foam cavilon sacral stage 4  nutrition 35cal/kg, protien 1.2-1.5g/kg, DM , Obesity management  offload  moisture barrier  compression 2 layer kerlex, ace  DVT prophylaxisis  established/new problem improved, stable , worsened  additional workup  data reviewed lab, tests, records, image  complexity high mod  risk high -  due to dx, complexity data, risk  time 70 min 223     87y old  Female w/ Influenza A infection with exacerbation of Heart failure Latvian speaking female (info obtained from daughter at bedside), with a PmHx of Afib (on aspirin and Xarelto), perforated appendicitis (1992), cataract surgery, cholecystectomy, hemorrhoids, colitis, presents to the ED with blood in bowel movement. admitted to [facility] on 11/28 for a GI bleed in which she was seen by GI and had a colonoscopy.    had a fever last note and that patients granddaughter (whom lives with them has been sick as well).    possible GI bleed with an incidental finding of Influenza AH3 positive.   no rectal blood,  no red blood in stool, consider abd binder       aquacel  foam cavilon sacral stage 4  cough / flu as per medicine r\management, check< from: Xray Chest 1 View- PORTABLE-Urgent (Xray Chest 1 View- PORTABLE-Urgent .) (12.09.22 @ 02:37) >lungs clear, with phlegm, repeat high wbc     nutrition 35cal/kg, protien 1.2-1.5g/kg,  management  offload  moisture barrier  DVT prophylaxisis  established/new problem  stable    data reviewed lab, tests, records, image  complexity high  risk high -  due to dx, complexity data, risk  time 70 min 223

## 2022-12-12 NOTE — DISCHARGE NOTE NURSING/CASE MANAGEMENT/SOCIAL WORK - NSDCPEFALRISK_GEN_ALL_CORE
For information on Fall & Injury Prevention, visit: [email or web address] OR  [email or web address] OR  https://www.cdc.gov/steadi/patient.html

## 2022-12-12 NOTE — DISCHARGE NOTE NURSING/CASE MANAGEMENT/SOCIAL WORK - PATIENT PORTAL LINK FT
You can access the FollowMyHealth Patient Portal offered by [facility] by registering at the following website: [email or web address]. By joining [employer] FollowMyHealth portal, you will also be able to view your health information using other applications (apps) compatible with our system.

## 2022-12-12 NOTE — CONSULT NOTE ADULT - SUBJECTIVE AND OBJECTIVE BOX
Patient is a 87y old  Female who presents with a chief complaint of Influenza A infection with exacerbation of Heart failure Malaysian speaking female (info obtained from daughter at bedside), with a PmHx of Afib (on aspirin and Xarelto), perforated appendicitis (1992), cataract surgery, cholecystectomy, hemorrhoids, colitis, presents to the ED with blood in bowel movement. Daughter explained that yesterday evening she noticed at 6:00PM loose stools in the patient and then at 8:00PM patient had to pass gas and sprayed blood into the toilet upon release. The daughter also thought she saw that the consistency of the stool was more "mucous". Daughter reports that patient is typically constipated. Blood volume was described as moderate and concerned the daughter to seek medical attention at [facility] for her mother since mother was hospitalized recently. Pt was recently admitted to [facility] on 11/28 for a GI bleed in which she was seen by GI and had a colonoscopy. Of note, daughter stated her mother also had a fever last note and that patients granddaughter (whom lives with them has been sick as well). Denies any N/V, hematemesis, abdominal pains, chills or recent weight changes. In the [facility] ED, she was found to be occult positive but her hgb was stable at 9.8 (at her baseline). She appears comfortable at this time and is now being admitted to medicine for a possible GI bleed with an incidental finding of Influenza AH3 positive.       REVIEW OF SYSTEMSsee HPI and PMH others neg  Allergies    No Known Allergies    Intolerances   	    MEDICATIONS  (STANDING):  ascorbic acid 500 milliGRAM(s) Oral two times a day  aspirin enteric coated 81 milliGRAM(s) Oral daily  atorvastatin 10 milliGRAM(s) Oral at bedtime  dibucaine 1% Ointment 1 Application(s) Topical daily  methimazole 5 milliGRAM(s) Oral daily  multivitamin 1 Tablet(s) Oral daily  oseltamivir 30 milliGRAM(s) Oral two times a day  polyethylene glycol 3350 17 Gram(s) Oral two times a day  rivaroxaban 15 milliGRAM(s) Oral daily  sodium chloride 0.9%. 1000 milliLiter(s) (50 mL/Hr) IV Continuous <Continuous>  sotalol. 120 milliGRAM(s) Oral every 24 hours    MEDICATIONS  (PRN):  acetaminophen     Tablet .. 650 milliGRAM(s) Oral every 6 hours PRN Temp greater or equal to 38C (100.4F), Mild Pain (1 - 3), Moderate Pain (4 - 6)      FAMILY HISTORY: grandgradaughter with flu        Social history:on smoker    PAST MEDICAL & SURGICAL HISTORY:  Atrial fibrillation  UTI (urinary tract infection)  Hypothyroidism  Colon cancer 7 yr ago   Hemorrhoids  HLD (hyperlipidemia)  History of cholecystectomy  Perforated appendicitis  History of cataract surgery      I&O's Summary    11 Dec 2022 07:01  -  12 Dec 2022 07:00  --------------------------------------------------------  IN: 240 mL / OUT: 300 mL / NET: -60 mL        Vital Signs Last 24 Hrs  T(C): 36.2 (12 Dec 2022 05:17), Max: 36.2 (12 Dec 2022 05:17)  T(F): 97.1 (12 Dec 2022 05:17), Max: 97.1 (12 Dec 2022 05:17)  HR: 69 (12 Dec 2022 05:17) (58 - 71)  BP: 106/62 (12 Dec 2022 05:17) (100/60 - 106/62)  BP(mean): --  RR: 18 (12 Dec 2022 05:17) (17 - 18)  SpO2: 99% (12 Dec 2022 05:17) (97% - 99%)    Parameters below as of 12 Dec 2022 05:17  Patient On (Oxygen Delivery Method): room air              PHYSICAL EXAM:   Constitutional: awake , moans  ENMT: non icteric  Back: sacral  stage fascia seen  2..5.x1  x.8, undermined7-12 9ock 1.5cm , 12ock 1.2cm  Respiratory:coughs  Cardiovascular:rrr  Gastrointestinal: non tender, large diarhea in bed/perineum  purewick  Extremities: non tender  Skin: as noted  Musculoskeletal: was standing   has walker and hospital bed at home    CBC Full  -  ( 11 Dec 2022 07:10 )  WBC Count : 15.61 K/uL  RBC Count : 3.51 M/uL  Hemoglobin : 10.1 g/dL  Hematocrit : 32.1 %  Platelet Count - Automated : 132 K/uL  Mean Cell Volume : 91.5 fL  Mean Cell Hemoglobin : 28.8 pg  Mean Cell Hemoglobin Concentration : 31.5 gm/dL  Auto Neutrophil # : 11.43 K/uL  Auto Lymphocyte # : 2.44 K/uL  Auto Monocyte # : 1.62 K/uL  Auto Eosinophil # : 0.00 K/uL  Auto Basophil # : 0.03 K/uL  Auto Neutrophil % : 73.2 %  Auto Lymphocyte % : 15.6 %  Auto Monocyte % : 10.4 %  Auto Eosinophil % : 0.0 %  Auto Basophil % : 0.2 %      12-11    137  |  97<L>  |  18  ----------------------------<  108<H>  3.6   |  29  |  0.75    Ca    8.3<L>      11 Dec 2022 07:10  Mg     1.90     12-11                Radiology:< from: CT Abdomen and Pelvis w/ IV Cont (12.09.22 @ 04:10) >  ETROPERITONEUM/LYMPH NODES: No lymphadenopathy.  ABDOMINAL WALL: Small umbilical hernia contains nonobstructed loop of   colon. Infraumbilical ventral abdominal wall hernia containing   nonobstructed small bowel.  BONES: Degenerative changes. Diffuse osseous demineralization.    IMPRESSION:  No acute intra abdominal abnormality.        --- End of Report ---            SIERRA QUINN MD; Attending Radiologist  This document has been electronically signed. Dec  9 2022  4:56AM    < end of copied text >         Patient is a 87y old  Female who presents with a chief complaint of Influenza A infection with exacerbation of Heart failure Nauruan speaking female (info obtained from daughter at bedside), with a PmHx of Afib (on aspirin and Xarelto), perforated appendicitis (1992), cataract surgery, cholecystectomy, hemorrhoids, colitis, presents to the ED with blood in bowel movement. Daughter explained that yesterday evening she noticed at 6:00PM loose stools in the patient and then at 8:00PM patient had to pass gas and sprayed blood into the toilet upon release. The daughter also thought she saw that the consistency of the stool was more "mucous". Daughter reports that patient is typically constipated. Blood volume was described as moderate and concerned the daughter to seek medical attention at [facility] for her mother since mother was hospitalized recently. Pt was recently admitted to [facility] on 11/28 for a GI bleed in which she was seen by GI and had a colonoscopy. Of note, daughter stated her mother also had a fever last note and that patients granddaughter (whom lives with them has been sick as well). Denies any N/V, hematemesis, abdominal pains, chills or recent weight changes. In the [facility] ED, she was found to be occult positive but her hgb was stable at 9.8 (at her baseline). She appears comfortable at this time and is now being admitted to medicine for a possible GI bleed with an incidental finding of Influenza AH3 positive.       REVIEW OF SYSTEMSsee HPI and PMH others neg  Allergies    No Known Allergies    Intolerances   	    MEDICATIONS  (STANDING):  ascorbic acid 500 milliGRAM(s) Oral two times a day  aspirin enteric coated 81 milliGRAM(s) Oral daily  atorvastatin 10 milliGRAM(s) Oral at bedtime  dibucaine 1% Ointment 1 Application(s) Topical daily  methimazole 5 milliGRAM(s) Oral daily  multivitamin 1 Tablet(s) Oral daily  oseltamivir 30 milliGRAM(s) Oral two times a day  polyethylene glycol 3350 17 Gram(s) Oral two times a day  rivaroxaban 15 milliGRAM(s) Oral daily  sodium chloride 0.9%. 1000 milliLiter(s) (50 mL/Hr) IV Continuous <Continuous>  sotalol. 120 milliGRAM(s) Oral every 24 hours    MEDICATIONS  (PRN):  acetaminophen     Tablet .. 650 milliGRAM(s) Oral every 6 hours PRN Temp greater or equal to 38C (100.4F), Mild Pain (1 - 3), Moderate Pain (4 - 6)      FAMILY HISTORY: grandgradaughter with flu        Social history:on smoker    PAST MEDICAL & SURGICAL HISTORY:  Atrial fibrillation  UTI (urinary tract infection)  Hypothyroidism  Colon cancer 7 yr ago   Hemorrhoids  HLD (hyperlipidemia)  History of cholecystectomy  Perforated appendicitis  History of cataract surgery      I&O's Summary    11 Dec 2022 07:01  -  12 Dec 2022 07:00  --------------------------------------------------------  IN: 240 mL / OUT: 300 mL / NET: -60 mL        Vital Signs Last 24 Hrs  T(C): 36.2 (12 Dec 2022 05:17), Max: 36.2 (12 Dec 2022 05:17)  T(F): 97.1 (12 Dec 2022 05:17), Max: 97.1 (12 Dec 2022 05:17)  HR: 69 (12 Dec 2022 05:17) (58 - 71)  BP: 106/62 (12 Dec 2022 05:17) (100/60 - 106/62)  BP(mean): --  RR: 18 (12 Dec 2022 05:17) (17 - 18)  weight 45.8  SpO2: 99% (12 Dec 2022 05:17) (97% - 99%)    Parameters below as of 12 Dec 2022 05:17  Patient On (Oxygen Delivery Method): room air      PHYSICAL EXAM:   Constitutional: awake , moans  ENMT: non icteric  Back: sacral  stage fascia seen  2..5.x1  x.8, undermined7-12 9ock 1.5cm , 12ock 1.2cm  Respiratory:coughs  Cardiovascular:rrr  Gastrointestinal: non tender, large diarhea in bed/perineum  purewick  Extremities: non tender  Skin: as noted  Musculoskeletal: was standing  at home for transfer  psych calm  has walker and hospital bed at home    CBC Full  -  ( 11 Dec 2022 07:10 )  WBC Count : 15.61 K/uL  RBC Count : 3.51 M/uL  Hemoglobin : 10.1 g/dL  Hematocrit : 32.1 %  Platelet Count - Automated : 132 K/uL  Mean Cell Volume : 91.5 fL  Mean Cell Hemoglobin : 28.8 pg  Mean Cell Hemoglobin Concentration : 31.5 gm/dL  Auto Neutrophil # : 11.43 K/uL  Auto Lymphocyte # : 2.44 K/uL  Auto Monocyte # : 1.62 K/uL  Auto Eosinophil # : 0.00 K/uL  Auto Basophil # : 0.03 K/uL  Auto Neutrophil % : 73.2 %  Auto Lymphocyte % : 15.6 %  Auto Monocyte % : 10.4 %  Auto Eosinophil % : 0.0 %  Auto Basophil % : 0.2 %      12-11    137  |  97<L>  |  18  ----------------------------<  108<H>  3.6   |  29  |  0.75    Ca    8.3<L>      11 Dec 2022 07:10  Mg     1.90     12-11                Radiology:< from: CT Abdomen and Pelvis w/ IV Cont (12.09.22 @ 04:10) >  ETROPERITONEUM/LYMPH NODES: No lymphadenopathy.  ABDOMINAL WALL: Small umbilical hernia contains nonobstructed loop of   colon. Infraumbilical ventral abdominal wall hernia containing   nonobstructed small bowel.  BONES: Degenerative changes. Diffuse osseous demineralization.    IMPRESSION:  No acute intra abdominal abnormality.        --- End of Report ---            SIERRA QUINN MD; Attending Radiologist  This document has been electronically signed. Dec  9 2022  4:56AM    < end of copied text >

## 2022-12-12 NOTE — DISCHARGE NOTE PROVIDER - ATTENDING DISCHARGE PHYSICAL EXAMINATION:
CONSTITUTIONAL: NAD, frail, elderly  HEENT: EOMI, temporal wasting, MMM  RESPIRATORY: Normal respiratory effort; lungs are clear to auscultation bilaterally  CARDIOVASCULAR: Regular rate and rhythm, normal S1 and S2, no murmurs  ABDOMEN: Nontender to palpation, normoactive bowel sounds, soft   PSYCH: anxious, but cooperative w/dgt present   NEUROLOGY: CN 2-12 are intact and symmetric; nonfocal   SKIN: No rashes; no palpable lesions

## 2022-12-12 NOTE — DISCHARGE NOTE PROVIDER - NSDCMRMEDTOKEN_GEN_ALL_CORE_FT
ascorbic acid 500 mg oral tablet: 1 tab(s) orally 2 times a day  Aspirin Enteric Coated 81 mg oral delayed release tablet: 1 tab(s) orally once a day  furosemide 20 mg oral tablet: 1 tab(s) orally once a day  methIMAzole 5 mg oral tablet: 1 tab(s) orally once a day  Multiple Vitamins oral tablet: 1 tab(s) orally once a day  polyethylene glycol 3350 oral powder for reconstitution: 17 gram(s) orally 2 times a day, As needed, Constipation  pravastatin 20 mg oral tablet: 1 tab(s) orally once a day  rivaroxaban 15 mg oral tablet: 1 tab(s) orally once a day (before a meal)  sotalol 120 mg oral tablet: 1 tab(s) orally once a day   acetaminophen 325 mg oral tablet: 2 tab(s) orally every 6 hours, As needed, Temp greater or equal to 38C (100.4F), Mild Pain (1 - 3), Moderate Pain (4 - 6)  ascorbic acid 500 mg oral tablet: 1 tab(s) orally 2 times a day  Aspirin Enteric Coated 81 mg oral delayed release tablet: 1 tab(s) orally once a day  ClearLax oral powder for reconstitution: 17 gram(s) orally 2 times a day  furosemide 20 mg oral tablet: 1 tab(s) orally once a day  methIMAzole 5 mg oral tablet: 1 tab(s) orally once a day  Multiple Vitamins oral tablet: 1 tab(s) orally once a day  pravastatin 20 mg oral tablet: 1 tab(s) orally once a day  rivaroxaban 15 mg oral tablet: 1 tab(s) orally once a day (before a meal)  sotalol 120 mg oral tablet: 1 tab(s) orally once a day   acetaminophen 325 mg oral tablet: 2 tab(s) orally every 6 hours, As needed, Temp greater or equal to 38C (100.4F), Mild Pain (1 - 3), Moderate Pain (4 - 6)  ascorbic acid 500 mg oral tablet: 1 tab(s) orally 2 times a day  Aspirin Enteric Coated 81 mg oral delayed release tablet: 1 tab(s) orally once a day  ClearLax oral powder for reconstitution: 17 gram(s) orally 2 times a day  dibucaine 1% topical ointment: 1 application topically once a day  furosemide 20 mg oral tablet: 1 tab(s) orally once a day  methIMAzole 5 mg oral tablet: 1 tab(s) orally once a day  Multiple Vitamins oral tablet: 1 tab(s) orally once a day  oseltamivir 30 mg oral capsule: 1 cap(s) orally 2 times a day to complete on 12/14/22  pravastatin 20 mg oral tablet: 1 tab(s) orally once a day  rivaroxaban 15 mg oral tablet: 1 tab(s) orally once a day (before a meal)  sotalol 120 mg oral tablet: 1 tab(s) orally once a day

## 2022-12-12 NOTE — DISCHARGE NOTE PROVIDER - NSDCFUADDAPPT_GEN_ALL_CORE_FT
Wound care instructions   -->Sacrum: Clean wound and periwound skin with NS. Pat dry. Apply liquid barrier film to periwound skin. Lightly pack depth and undermining with Aquacel AG, leave 2 inches out to wick. Cover with Silcone foam with border. Change daily or as needed .   -->Incontinence care to lower buttocks and perineum: Clean with skin cleanser. Pat dry. Apply Corbin barrier cream twice a day or PRN if soiled.   -->Bilateral heels: Apply liquid barrier film twice a day. Offload with complete cair boots.   -->Continue to turn and reposition

## 2022-12-26 NOTE — ED ADULT TRIAGE NOTE - CHIEF COMPLAINT QUOTE
Pt brought in by EMS from home for witnessed syncopal episode. As per EMS, pt passed out on the chair, did not hit her head. Pt recently treated in the hospital, diagnosed with +Flu on 12/9. Daughter at bedside states pt has productive cough at home with difficulty breathing. EMS states pt noted to have heart rate ranging from 35bpm-70bpm. Hx of Afib

## 2022-12-26 NOTE — ED PROVIDER NOTE - PHYSICAL EXAMINATION
GEN: Patient eyes closed, intermittently opens to voice, NAD.   HEENT: normocephalic, atraumatic, EOMI, no scleral icterus, moist MM  CARDIAC: RRR, S1, S2, no murmur.   PULM: CTA B/L no wheeze, rhonchi, rales.   ABD: soft NT, ND, no rebound no guarding, no CVA tenderness.   MSK: Moving all extremities, no edema. 5/5 strength and full ROM in all extremities.     NEURO: A&Ox3, gait normal, no focal neurological deficits, CN 2-12 grossly intact  SKIN: warm, dry, no rash.

## 2022-12-26 NOTE — ED PROVIDER NOTE - OBJECTIVE STATEMENT
87-year-old female past medical history of A. fib (on aspirin and Xarelto), cholecystectomy, hemorrhoids, colitis, recent admission for bloody bowel movement and sepsis secondary to influenza, presents with syncopal episode.  Patient's aide was feeding her applesauce when she had a coughing fit and then lost consciousness.  Patient regained consciousness after a few seconds.  The ED patient noted to be slow A. fib to the 40s with blood pressure in the 120s.  Copy of MOLST form in EMR states patient is DNR/DNI; confirmed with daughter at bedside DNR DNI status unchanged.

## 2022-12-26 NOTE — ED PROVIDER NOTE - ATTENDING CONTRIBUTION TO CARE
I (Tico) agree with above, I performed a history and physical. Counseled [facility] medical staff, physician assistant, and/or medical student on medical decision making as documented. Medical decisions and treatment interventions were made in real time during the patient encounter. Additionally and/or with the following exceptions: Patient is an 87-year old female past medical history of A. fib on aspirin and Xarelto, cholecystectomy, hemorrhoids, colitis, recent admission for fluid who is presenting with a syncopal episode.  Earlier today her daughter saw her eyes rolled to the back of her head, the daughter thought she was dying at that time.  The patient regained consciousness and was transported to the ED. in the emergency department she was noted to intermittently be in slow A. fib and switching to normal sinus rhythm.  She had multiple episodes of asystolic events lasting 2 to 4 seconds.  During that time she nearly lost consciousness.  Zoll pads were placed.  CBC with leukocytosis.  CMP within normal limits except for mildly elevated AST to 63 and potassium of 5.4 which is hemolyzed.  Chest x-ray was clear.  EP was consulted for asystolic pauses.  Anticipate admission to ICU level of care.  Will be signed out to oncoming attending pending final assessment by electrophysiology.  I reviewed monitor data at least 2 times 10 minutes or more apart during the patients stay.  Additional time as above spent by myself, separate from billable procedures, included coordination of patient care with consultants/admitting team, performing reassessments on the patient, documentation, and counseling patient/family members on the care provided.

## 2022-12-26 NOTE — ED ADULT NURSE REASSESSMENT NOTE - NS ED NURSE REASSESS COMMENT FT1
patient resting on stretcher. not in acute distress. A+Ox2-3. Daughter at bedside. cardiac monitor shows Afib, 40-70. while talking to patient, noted that patient's eyes started rolling back and monitor shows asystole. MD Doshi made aware of it.  patient commenced on Zoll monitoring.

## 2022-12-26 NOTE — CONSULT NOTE ADULT - SUBJECTIVE AND OBJECTIVE BOX
HPI: 87-year-old female past medical history of A. fib (on aspirin and Xarelto) on Sotalol, cholecystectomy, hemorrhoids, colitis, recent admission for bloody bowel movement and sepsis secondary to influenza, presents with syncopal episode. Per daughter, patient was being fed and then had brief syncopal episode before regaining consciousness. Patient brought to the ED for eval. In the ED, noted to have slow Afib on monitor with episodes of pauses occurring on telemetry review. Patient at baseline per daughter at this time and offers no acute complaints. Vitals obtained at the bedside stable at this time (HR 70s while being examined). Labs significant for hyperkalemia and elevated TSH. Troponin negative at this time.       Allergies  No Known Allergies    MEDICATIONS  (STANDING):    MEDICATIONS  (PRN):      Daily Height in cm: 134.62 (26 Dec 2022 17:15)    Daily     Drug Dosing Weight  Height (cm): 134.6 (26 Dec 2022 17:15)  Weight (kg): 45.8 (09 Dec 2022 09:26)  BMI (kg/m2): 25.3 (26 Dec 2022 17:15)  BSA (m2): 1.28 (26 Dec 2022 17:15)    PAST MEDICAL & SURGICAL HISTORY:  Atrial fibrillation      UTI (urinary tract infection)      Hypothyroidism      Colon cancer      Hemorrhoids      HLD (hyperlipidemia)      History of cholecystectomy      Perforated appendicitis      History of cataract surgery      REVIEW OF SYSTEMS:    CONSTITUTIONAL: No fever, weight loss, or fatigue  EYES: No eye pain, visual disturbances, or discharge  ENMT:  No difficulty hearing, tinnitus, vertigo; No sinus or throat pain  NECK: No pain or stiffness  BREASTS: No pain, masses, or nipple discharge  RESPIRATORY: No cough, wheezing, chills or hemoptysis; No shortness of breath  CARDIOVASCULAR: No chest pain, palpitations, dizziness, or leg swelling  GASTROINTESTINAL: No abdominal or epigastric pain. No nausea, vomiting, or hematemesis; No diarrhea or constipation. No melena or hematochezia.  GENITOURINARY: No dysuria, frequency, hematuria, or incontinence  NEUROLOGICAL: No headaches, memory loss, loss of strength, numbness, or tremors  SKIN: No itching, burning, rashes, or lesions   LYMPH NODES: No enlarged glands  ENDOCRINE: No heat or cold intolerance; No hair loss  MUSCULOSKELETAL: No joint pain or swelling; No muscle, back, or extremity pain    ICU Vital Signs Last 24 Hrs  T(C): 36.2 (26 Dec 2022 20:59), Max: 36.3 (26 Dec 2022 18:13)  T(F): 97.2 (26 Dec 2022 20:59), Max: 97.4 (26 Dec 2022 18:13)  HR: 67 (26 Dec 2022 22:12) (49 - 75)  BP: 95/57 (26 Dec 2022 22:12) (95/57 - 121/98)-  RR: 12 (26 Dec 2022 22:12) (12 - 20)  SpO2: 98% (26 Dec 2022 22:12) (94% - 98%)    O2 Parameters below as of 26 Dec 2022 22:12  Patient On (Oxygen Delivery Method): room ai      PHYSICAL EXAM:    GENERAL: NAD, well-groomed, well-developed  HEAD:  Atraumatic, Normocephalic  EYES: EOMI, PERRLA, conjunctiva and sclera clear  ENMT: No tonsillar erythema, exudates, or enlargement; Moist mucous membranes, Good dentition, No lesions  NECK: Supple, No JVD, Normal thyroid  NERVOUS SYSTEM:  Alert & Oriented X3, Good concentration; Motor Strength 5/5 B/L upper and lower extremities; DTRs 2+ intact and symmetric  CHEST/LUNG: Clear to percussion bilaterally; No rales, rhonchi, wheezing, or rubs  HEART: Regular rate and rhythm; No murmurs, rubs, or gallops  ABDOMEN: Soft, Nontender, Nondistended; Bowel sounds present  EXTREMITIES:  2+ Peripheral Pulses, No clubbing, cyanosis, or edema  LYMPH: No lymphadenopathy noted  SKIN: No rashes or lesions    LABS:  CBC Full  -  ( 26 Dec 2022 21:58 )  WBC Count : 14.53 K/uL  RBC Count : 3.89 M/uL  Hemoglobin : 11.0 g/dL  Hematocrit : 36.3 %  Platelet Count - Automated : 185 K/uL  Mean Cell Volume : 93.3 fL  Mean Cell Hemoglobin : 28.3 pg  Mean Cell Hemoglobin Concentration : 30.3 gm/dL  Auto Neutrophil # : 12.83 K/uL  Auto Lymphocyte # : 0.94 K/uL  Auto Monocyte # : 0.65 K/uL  Auto Eosinophil # : 0.00 K/uL  Auto Basophil # : 0.02 K/uL  Auto Neutrophil % : 88.3 %  Auto Lymphocyte % : 6.5 %  Auto Monocyte % : 4.5 %  Auto Eosinophil % : 0.0 %  Auto Basophil % : 0.1 %    12-26    144  |  106  |  21  ----------------------------<  108<H>  5.4<H>   |  28  |  0.63    Ca    7.9<L>      26 Dec 2022 21:58    TPro  7.4  /  Alb  2.5<L>  /  TBili  0.4  /  DBili  x   /  AST  63<H>  /  ALT  32  /  AlkPhos  163<H>  12-26    CAPILLARY BLOOD GLUCOSE

## 2022-12-26 NOTE — CONSULT NOTE ADULT - ASSESSMENT
87-year-old female past medical history of A. fib (on aspirin and Xarelto) on Sotalol, cholecystectomy, hemorrhoids, colitis, recent admission for bloody bowel movement and sepsis secondary to influenza, presents with syncopal episode. Per daughter, patient was being fed and then had brief syncopal episode before regaining consciousness. Patient brought to the ED for eval. In the ED, noted to have slow Afib on monitor with episodes of pauses occurring on telemetry review. Patient at baseline per daughter at this time and offers no acute complaints. Vitals obtained at the bedside stable at this time (HR 70s while being examined). Labs significant for hyperkalemia and elevated TSH. Troponin negative at this time. Case discussed in detail with Dr. Jimenez.    -- Admit to telemetry   -- HOLD Sotalol at this time  -- Correct underlying metabolic issues  -- Check T3/4  -- If patients condition worsens or becomes unstable, call [phone]   87-year-old female past medical history of A. fib (on aspirin and Xarelto) on Sotalol, cholecystectomy, hemorrhoids, colitis, recent admission for bloody bowel movement and sepsis secondary to influenza, presents with syncopal episode. Per daughter, patient was being fed and then had brief syncopal episode before regaining consciousness. Patient brought to the ED for eval. In the ED, noted to have slow Afib on monitor with episodes of pauses occurring on telemetry review. Patient at baseline per daughter at this time and offers no acute complaints. Vitals obtained at the bedside stable at this time (HR 70s while being examined). Labs significant for hyperkalemia and elevated TSH. Troponin negative at this time. Case discussed in detail with Dr. Jimenez.    -- Admit to telemetry   -- HOLD Sotalol at this time  -- Continue with Eliquis and Aspirin  -- Correct underlying metabolic issues  -- Check T3/4  -- If patients condition worsens or becomes unstable, call [phone]

## 2022-12-26 NOTE — ED ADULT NURSE REASSESSMENT NOTE - NS ED NURSE REASSESS COMMENT FT1
Patient resting on stretcher. not in acute distress. Patient is having multiple episodes of pauses which provider made aware about it. Peripheries are cold. Bilateral feet is red in color. patient has a stage 4 sacral sore  which is packed. Patient appears sleepy but arousal to voice.

## 2022-12-26 NOTE — ED ADULT NURSE NOTE - OBJECTIVE STATEMENT
Pt brought in by EMS from home for witnessed syncopal episode. As per EMS, pt passed out on the chair, did not hit her head. Pt recently treated in the hospital, diagnosed with +Flu on 12/9. Daughter at bedside states pt has productive cough at home with difficulty breathing. EMS states pt noted to have heart rate ranging from 35bpm-70bpm. Hx of Afib  Patient to room 24. Patient has family at bedside. Patient is alert and oriented. 22 gauge IV lock placed to left hand. Unable to obtain labs at this time. Waiting for labs to be drawn by ultrasound.   GLADIS Mattson

## 2022-12-26 NOTE — ED PROVIDER NOTE - PROGRESS NOTE DETAILS
Lavonne Balderas PGY2: I received sign out on this patient. I have reassessed patient and agree with the current plan. Patient seen by cards/EP, recommend medicine admission to tele at this time. Patient BP remains soft but is stable at this time.

## 2022-12-27 PROBLEM — K64.9 UNSPECIFIED HEMORRHOIDS: Chronic | Status: ACTIVE | Noted: 2022-01-01

## 2022-12-27 PROBLEM — E78.5 HYPERLIPIDEMIA, UNSPECIFIED: Chronic | Status: ACTIVE | Noted: 2022-01-01

## 2022-12-27 PROBLEM — C18.9 MALIGNANT NEOPLASM OF COLON, UNSPECIFIED: Chronic | Status: ACTIVE | Noted: 2022-01-01

## 2022-12-27 PROBLEM — N39.0 URINARY TRACT INFECTION, SITE NOT SPECIFIED: Chronic | Status: ACTIVE | Noted: 2022-01-01

## 2022-12-27 PROBLEM — E03.9 HYPOTHYROIDISM, UNSPECIFIED: Chronic | Status: ACTIVE | Noted: 2022-01-01

## 2022-12-27 NOTE — PROGRESS NOTE ADULT - PROBLEM SELECTOR PLAN 4
Pt with leukocytosis, with warmth and erythematous LLE raising c/f cellulitis. Per daughter, this is new. No purulence or drainage  - Will start cefazolin 1g q8  - Trend WBC, monitor for fever. If not improving or worsening clinical status, will start coverage for MRSA

## 2022-12-27 NOTE — H&P ADULT - PROBLEM SELECTOR PLAN 6
Pt with elevated leukocytosis on admission to 14. Pt without any signs of infection. Pt with elevated WBC in the past, ?chronic  - Monitor for now  - If worsening or pt with signs of infection, will investigate further as appropriate Pt with hx of hemorrhoidal bleeding, On miralax at home  - c/w with miralax. Will have dulcolax PRN available Pt with dry cough i/s/o recent flu infection likely post viral bronchitis  - tessalon perles as needed Pt with dry cough i/s/o recent flu infection likely post viral bronchitis  - tessalon perles as needed  - Has some end expiratory wheezing, likely 2/2 bronchitis, will place on duonebs

## 2022-12-27 NOTE — PROGRESS NOTE ADULT - PROBLEM SELECTOR PLAN 8
Pt with elevated AST and alkphos likely i/s/o methimazole and statin use  - Hold pravastatin. Hold methimazole as above  - Trend CMP daily  - If worsening, would obtain RUQ US

## 2022-12-27 NOTE — CONSULT NOTE ADULT - SUBJECTIVE AND OBJECTIVE BOX
GENERAL SURGERY CONSULT NOTE  Patient is a 87y old  Female who presents with a chief complaint of Syncope (27 Dec 2022 04:22)    HPI:  87-year-old female past medical history of A. fib (on aspirin and Xarelto) on Sotalol, cholecystectomy, hemorrhoids, colitis, recent admission for bloody bowel movement and sepsis secondary to influenza, presents with syncopal episode. On evaluation ED noted pt with blue cold foot with palpable PT and DP pulse. Vascular consulted    10-points review of system performed with pertinent negative and postive findings documented in the HPI     PAST MEDICAL & SURGICAL HISTORY:  Atrial fibrillation  UTI (urinary tract infection)  Hypothyroidism  Colon cancer  Hemorrhoids  HLD (hyperlipidemia)  History of cholecystectomy  Perforated appendicitis  History of cataract surgery  [  ] No significant past history as reviewed with the patient and family    FAMILY HISTORY:  : Family history not pertinent as reviewed with the patient and family  SOCIAL HISTORY: No pertinent social history  MEDICATIONS  (STANDING):  ascorbic acid 500 milliGRAM(s) Oral daily  aspirin enteric coated 81 milliGRAM(s) Oral daily  multivitamin 1 Tablet(s) Oral daily  polyethylene glycol 3350 17 Gram(s) Oral two times a day  rivaroxaban 15 milliGRAM(s) Oral with dinner  MEDICATINS  (PRN):  benzonatate 100 milliGRAM(s) Oral every 8 hours PRN Cough  bisacodyl 5 milliGRAM(s) Oral every 12 hours PRN Constipation  Allergies  No Known Allergies  Intolerances    Vital Signs Last 24 Hrs  T(C): 36.1 (27 Dec 2022 02:22), Max: 36.3 (26 Dec 2022 18:13)  T(F): 97 (27 Dec 2022 02:22), Max: 97.4 (26 Dec 2022 18:13)  HR: 56 (27 Dec 2022 04:12) (49 - 89)  BP: 92/67 (27 Dec 2022 04:12) (92/67 - 121/98)  RR: 14 (27 Dec 2022 04:12) (12 - 20)  SpO2: 97% (27 Dec 2022 04:12) (94% - 98%)  Parameters below as of 27 Dec 2022 04:12  Patient On (Oxygen Delivery Method): room air    Daily Height in cm: 134.62 (26 Dec 2022 17:15)      Exam:  General: Awake and alert  Resp:  Abd:  Ext:                       11.0   14.53 )-----------( 185      ( 26 Dec 2022 21:58 )             36.3   12-26  x   |  x   |  x   ----------------------------<  x   4.8   |  x   |  x   Ca    7.9<L>      26 Dec 2022 21:58  TPro  7.4  /  Alb  2.5<L>  /  TBili  0.4  /  DBili  x   /  AST  63<H>  /  ALT  32  /  AlkPhos  163<H>  12-26    IMAGING STUDIES:  None             GENERAL SURGERY CONSULT NOTE  Patient is a 87y old  Female who presents with a chief complaint of Syncope (27 Dec 2022 04:22)    HPI:  87-year-old female past medical history of A. fib (on aspirin and Xarelto) on Sotalol, cholecystectomy, hemorrhoids, colitis, recent admission for bloody bowel movement and sepsis secondary to influenza, presents with syncopal episode. On evaluation ED noted pt with blue cold foot with palpable PT and DP pulse. Vascular consulted. Patient seen and examined at bedside in ED with daughter and RN present. Patient denies pain to her foot and denies numbness and tingling. As per daughter patients foot normally is blue and changes color depending on patients leg position.    10-points review of system performed with pertinent negative and positive findings documented in the HPI     PAST MEDICAL & SURGICAL HISTORY:  Atrial fibrillation  UTI (urinary tract infection)  Hypothyroidism  Colon cancer  Hemorrhoids  HLD (hyperlipidemia)  History of cholecystectomy  Perforated appendicitis  History of cataract surgery  [  ] No significant past history as reviewed with the patient and family    FAMILY HISTORY:  : Family history not pertinent as reviewed with the patient and family  SOCIAL HISTORY: No pertinent social history  MEDICATIONS  (STANDING):  ascorbic acid 500 milliGRAM(s) Oral daily  aspirin enteric coated 81 milliGRAM(s) Oral daily  multivitamin 1 Tablet(s) Oral daily  polyethylene glycol 3350 17 Gram(s) Oral two times a day  rivaroxaban 15 milliGRAM(s) Oral with dinner  MEDICATINS  (PRN):  benzonatate 100 milliGRAM(s) Oral every 8 hours PRN Cough  bisacodyl 5 milliGRAM(s) Oral every 12 hours PRN Constipation  Allergies  No Known Allergies  Intolerances    Vital Signs Last 24 Hrs  T(C): 36.1 (27 Dec 2022 02:22), Max: 36.3 (26 Dec 2022 18:13)  T(F): 97 (27 Dec 2022 02:22), Max: 97.4 (26 Dec 2022 18:13)  HR: 56 (27 Dec 2022 04:12) (49 - 89)  BP: 92/67 (27 Dec 2022 04:12) (92/67 - 121/98)  RR: 14 (27 Dec 2022 04:12) (12 - 20)  SpO2: 97% (27 Dec 2022 04:12) (94% - 98%)  Parameters below as of 27 Dec 2022 04:12  Patient On (Oxygen Delivery Method): room air    Daily Height in cm: 134.62 (26 Dec 2022 17:15)      Exam:  General: Awake and alert, appears tired  Resp: unlabored  Ext: LLE foot warm and erythematous, RLE toes blue in color and cool to touch, motor and sensroy intact, remaining foot is warm with palpable PT and DP pulses.                        11.0   14.53 )-----------( 185      ( 26 Dec 2022 21:58 )             36.3   12-26  x   |  x   |  x   ----------------------------<  x   4.8   |  x   |  x   Ca    7.9<L>      26 Dec 2022 21:58  TPro  7.4  /  Alb  2.5<L>  /  TBili  0.4  /  DBili  x   /  AST  63<H>  /  ALT  32  /  AlkPhos  163<H>  12-26    IMAGING STUDIES:  None             GENERAL SURGERY CONSULT NOTE  Patient is a 87y old  Female who presents with a chief complaint of Syncope (27 Dec 2022 04:22)    HPI:  87-year-old female past medical history of A. fib (on aspirin and Xarelto) on Sotalol, cholecystectomy, hemorrhoids, colitis, recent admission for bloody bowel movement and sepsis secondary to influenza, presents with syncopal episode. On evaluation ED noted pt with blue cold foot with palpable PT and DP pulse. Vascular consulted. Patient seen and examined at bedside in ED with daughter and RN present. Patient denies pain to her foot and denies numbness and tingling. As per daughter patients foot normally is blue and changes color depending on patients leg position.    10-points review of system performed with pertinent negative and positive findings documented in the HPI     PAST MEDICAL & SURGICAL HISTORY:  Atrial fibrillation  UTI (urinary tract infection)  Hypothyroidism  Colon cancer  Hemorrhoids  HLD (hyperlipidemia)  History of cholecystectomy  Perforated appendicitis  History of cataract surgery  [  ] No significant past history as reviewed with the patient and family    FAMILY HISTORY:  : Family history not pertinent as reviewed with the patient and family  SOCIAL HISTORY: No pertinent social history  MEDICATIONS  (STANDING):  ascorbic acid 500 milliGRAM(s) Oral daily  aspirin enteric coated 81 milliGRAM(s) Oral daily  multivitamin 1 Tablet(s) Oral daily  polyethylene glycol 3350 17 Gram(s) Oral two times a day  rivaroxaban 15 milliGRAM(s) Oral with dinner  MEDICATINS  (PRN):  benzonatate 100 milliGRAM(s) Oral every 8 hours PRN Cough  bisacodyl 5 milliGRAM(s) Oral every 12 hours PRN Constipation  Allergies  No Known Allergies  Intolerances    Vital Signs Last 24 Hrs  T(C): 36.1 (27 Dec 2022 02:22), Max: 36.3 (26 Dec 2022 18:13)  T(F): 97 (27 Dec 2022 02:22), Max: 97.4 (26 Dec 2022 18:13)  HR: 56 (27 Dec 2022 04:12) (49 - 89)  BP: 92/67 (27 Dec 2022 04:12) (92/67 - 121/98)  RR: 14 (27 Dec 2022 04:12) (12 - 20)  SpO2: 97% (27 Dec 2022 04:12) (94% - 98%)  Parameters below as of 27 Dec 2022 04:12  Patient On (Oxygen Delivery Method): room air    Daily Height in cm: 134.62 (26 Dec 2022 17:15)      Exam:  General: Awake and alert, appears tired  Resp: unlabored  Ext: LLE foot warm and erythematous, RLE toes blue in color and cool to touch, motor and sensory intact, remaining foot is warm with palpable PT and DP pulses.                        11.0   14.53 )-----------( 185      ( 26 Dec 2022 21:58 )             36.3   12-26  x   |  x   |  x   ----------------------------<  x   4.8   |  x   |  x   Ca    7.9<L>      26 Dec 2022 21:58  TPro  7.4  /  Alb  2.5<L>  /  TBili  0.4  /  DBili  x   /  AST  63<H>  /  ALT  32  /  AlkPhos  163<H>  12-26    IMAGING STUDIES:  None             GENERAL SURGERY CONSULT NOTE  Patient is a 87y old  Female who presents with a chief complaint of Syncope (27 Dec 2022 04:22)    HPI:  87-year-old female past medical history of A. fib (on aspirin and Xarelto) on Sotalol, cholecystectomy, hemorrhoids, colitis, recent admission for bloody bowel movement and sepsis secondary to influenza, presents with syncopal episode. On evaluation ED noted pt with blue cold foot with palpable PT and DP pulse. Vascular consulted. Patient seen and examined at bedside in ED with daughter and RN present. Patient denies pain to her foot and denies numbness and tingling. As per daughter patients foot normally is blue and changes color depending on patients leg position.    10-points review of system performed with pertinent negative and positive findings documented in the HPI     PAST MEDICAL & SURGICAL HISTORY:  Atrial fibrillation  UTI (urinary tract infection)  Hypothyroidism  Colon cancer  Hemorrhoids  HLD (hyperlipidemia)  History of cholecystectomy  Perforated appendicitis  History of cataract surgery  [  ] No significant past history as reviewed with the patient and family    FAMILY HISTORY:  : Family history not pertinent as reviewed with the patient and family  SOCIAL HISTORY: No pertinent social history  MEDICATIONS  (STANDING):  ascorbic acid 500 milliGRAM(s) Oral daily  aspirin enteric coated 81 milliGRAM(s) Oral daily  multivitamin 1 Tablet(s) Oral daily  polyethylene glycol 3350 17 Gram(s) Oral two times a day  rivaroxaban 15 milliGRAM(s) Oral with dinner  MEDICATINS  (PRN):  benzonatate 100 milliGRAM(s) Oral every 8 hours PRN Cough  bisacodyl 5 milliGRAM(s) Oral every 12 hours PRN Constipation  Allergies  No Known Allergies  Intolerances    Vital Signs Last 24 Hrs  T(C): 36.1 (27 Dec 2022 02:22), Max: 36.3 (26 Dec 2022 18:13)  T(F): 97 (27 Dec 2022 02:22), Max: 97.4 (26 Dec 2022 18:13)  HR: 56 (27 Dec 2022 04:12) (49 - 89)  BP: 92/67 (27 Dec 2022 04:12) (92/67 - 121/98)  RR: 14 (27 Dec 2022 04:12) (12 - 20)  SpO2: 97% (27 Dec 2022 04:12) (94% - 98%)  Parameters below as of 27 Dec 2022 04:12  Patient On (Oxygen Delivery Method): room air    Daily Height in cm: 134.62 (26 Dec 2022 17:15)      Exam:  General: Awake and alert, appears tired  Resp: unlabored  Ext: LLE foot warm and erythematous, RLE toes blue in color and cool to touch, motor and sensory intact, remaining foot is warm with palpable PT and DP pulses, doppler used for confirmation.                        11.0   14.53 )-----------( 185      ( 26 Dec 2022 21:58 )             36.3   12-26  x   |  x   |  x   ----------------------------<  x   4.8   |  x   |  x   Ca    7.9<L>      26 Dec 2022 21:58  TPro  7.4  /  Alb  2.5<L>  /  TBili  0.4  /  DBili  x   /  AST  63<H>  /  ALT  32  /  AlkPhos  163<H>  12-26    IMAGING STUDIES:  None

## 2022-12-27 NOTE — PROGRESS NOTE ADULT - PROBLEM SELECTOR PLAN 3
Patient denies pain to her R foot and denies numbness and tingling. As per daughter patients foot normally is blue and changes color depending on patients leg position.  - appreciate vascular reccs Patient denies pain to her R foot and denies numbness and tingling. As per daughter patients foot normally is blue and changes color depending on patients leg position.  - CTA A/P with IV contrast with aortic runoff to b/l feet

## 2022-12-27 NOTE — H&P ADULT - PROBLEM SELECTOR PLAN 8
DVT: Xarelto  Diet: Pureed, DASH. Advance as tolerated  Dispo: Pending clinical improvement DVT: Xarelto  Diet: NPO as failed dysphagia, S&S. Advance as tolerated  Dispo: Pending clinical improvement Pt with elevated AST and alkphos likely i/s/o methimazole and statin use  - Hold pravastatin. Hold methimazole as above  - Trend CMP daily  - If worsening, would obtain RUQ US

## 2022-12-27 NOTE — PROGRESS NOTE ADULT - ASSESSMENT
87-year-old female past medical history of A. fib (on aspirin and Xarelto) on Sotalol, cholecystectomy, hemorrhoids, colitis, recent admission for bloody bowel movement and sepsis secondary to influenza, presents with syncopal episode i/s/o situational syncope vs cardiac syncope. Tele with slow afib with frequent pauses. Admitted to medicine for further management

## 2022-12-27 NOTE — PATIENT PROFILE ADULT - FUNCTIONAL ASSESSMENT - BASIC MOBILITY SCORE.
Renal Progress Note    Assessment and Plan:   1. End-stage kidney disease on hemodialysis. 2. SARS-CoV-2 pneumonia  3. Hypertension primary reasonably controlled  4. COPD  5. Deconditioning  6. Anemia of chronic disease    PLAN:  1. Labs reviewed   2. Medications reviewed  3. Will add retacrit 6000 units three days a day  4. Hemodialysis in am  5. Will follow     Patient Active Problem List:     CAD (coronary artery disease)     COPD (chronic obstructive pulmonary disease) ([address]     Chronic renal insufficiency     Patient overweight     Arthritis-  low back and neck .      CKD (chronic kidney disease) stage 3, GFR 30-59 ml/min (HCC)     Dyspnea and respiratory abnormalities     ED (erectile dysfunction)     Degenerative joint disease of knee, left     Gout of big toe     Anemia, chronic disease     CKD (chronic kidney disease) stage 4, GFR 15-29 ml/min (HCC)     Essential hypertension     Monoclonal gammopathy     Leukopenia     Thrombocytopenia (HCC)     Chronic kidney disease (CKD), stage V (HCC)     Metabolic acidosis     Hyperkalemia     Iron deficiency anemia     ROSAURA (acute kidney injury) ([address]     Plasma cell dyscrasia     Idiopathic hypotension     Hypertensive urgency     ESRD (end stage renal disease) on dialysis (HCC)     Systolic congestive heart failure (HCC)     Other fluid overload (CODE)     Hyperphosphatemia     Acute diastolic congestive heart failure (HCC)     Acute on chronic diastolic congestive heart failure (HCC)     Pulmonary hypertension (HCC)     Anemia due to chronic kidney disease, on chronic dialysis (HCC)     Volume overload     Secondary hyperparathyroidism of renal origin ([address]     Chest pain     Acute pulmonary edema (HCC)     Accelerated hypertension     Gastritis and duodenitis     Fall from slip, trip, or stumble, initial encounter     Closed fracture of left ankle     ESRD on hemodialysis ([address]     Hypertensive emergency     SOB (shortness of breath)     Chronic combined systolic and diastolic CHF (congestive heart failure) ([facility]     Pneumonia due to COVID-19 virus      Subjective:   Admit Date: 9/13/2021    Interval History:   Stable for end-stage kidney disease hemodialysis. Awake and alert this morning  Denies any complaint. Updated by the staff. No new issues. Presented with shortness of breath  Found to be positive for SARS-CoV-2        Medications:   Scheduled Meds:   [START ON 9/15/2021] calcitRIOL  1.5 mcg Oral Once per day on Mon Wed Fri    [START ON 9/15/2021] cinacalcet  60 mg Oral Once per day on Mon Wed Fri    docusate sodium  100 mg Oral BID    ferrous sulfate  325 mg Oral Daily with breakfast    gabapentin  100 mg Oral TID    hydrALAZINE  25 mg Oral BID    metoprolol  100 mg Oral BID    pantoprazole  40 mg Oral QAM AC    vitamin C  500 mg Oral Daily    polyethylene glycol  17 g Oral Daily    sodium chloride flush  5-40 mL IntraVENous 2 times per day    dexamethasone  6 mg Oral Daily    clopidogrel  75 mg Oral Daily     Continuous Infusions:   sodium chloride         CBC:   Recent Labs     09/13/21 1318 09/13/21 2034 09/14/21 0326   WBC 5.7 3.5* 4.0*   HGB 10.3* 8.0* 8.1*    137 142     CMP:    Recent Labs     09/13/21 1318 09/14/21 0326    139   K 4.6 4.9   CL 95* 97*   CO2 30 31   BUN 22 36*   CREATININE 4.4* 5.9*   GLUCOSE 111* 101   CALCIUM 9.3 8.2*   LABGLOM 15* 11*     Troponin: No results for input(s): TROPONINI in the last 72 hours. BNP: No results for input(s): BNP in the last 72 hours.   INR:   Recent Labs     09/13/21 2034 09/14/21 0326   INR 1.15* 1.12     Lipids:   Recent Labs     09/13/21  1318   LIPASE 49.1     Liver:   Recent Labs     09/14/21 0326   AST 39   ALT 11   ALKPHOS 76   PROT 5.7*   LABALBU 3.0*   BILITOT 0.5     Iron:    Recent Labs     09/14/21 0326   FERRITIN 3,607*     XR CHEST PORTABLE   Final Result   Cardiomegaly with vascular congestion and interstitial edema and effusions differential would include congestive heart failure or fluid overload. **This report has been created using voice recognition software. It may contain minor errors which are inherent in voice recognition technology. **      Final report electronically signed by Dr. Caprice Alvarez on 9/13/2021 2:22 PM            Objective:   Vitals: BP (!) 155/64   Pulse 63   Temp 98.2 °F (36.8 °C) (Oral)   Resp 20   Ht 5' 6\" (1.676 m)   Wt 163 lb (73.9 kg)   SpO2 96%   BMI 26.31 kg/m²    Wt Readings from Last 3 Encounters:   09/13/21 163 lb (73.9 kg)   07/14/21 145 lb 8.1 oz (66 kg)   06/17/21 161 lb (73 kg)      24HR INTAKE/OUTPUT:      Intake/Output Summary (Last 24 hours) at 9/14/2021 0837  Last data filed at 9/14/2021 0256  Gross per 24 hour   Intake 361.54 ml   Output --   Net 361.54 ml       Constitutional: Well-developed elderly alert, awake, no apparent distress   Skin:normal with no rash or lesions. HEENT:Pupils are reactive . Throat is clear . Oral mucosa is moist   Neck:supple with no thyromegaly or carotid bruit  Cardiovascular:  S1, S2 without murmur or rubs   Respiratory:  Clear to ausculation without wheezes, rhonchi or rales. Abdomen: +bs, soft, no tenderness to palpation and no palpable mass. No abdominal bruit   Ext:No LE edema  Musculoskeletal:Intact  Neuro:Alert and awake with no focal deficit. Speech is normal but distant      Electronically signed by Sixto Blankenship MD on 9/14/2021 at 8:37 AM  **This report has been created using voice recognition software. It maycontain minor  errors which are inherent in voice recognition technology. ** 6

## 2022-12-27 NOTE — H&P ADULT - PROBLEM SELECTOR PLAN 5
Pt with elevated AST and alkphos likely i/s/o methimazole and statin use  - Hold pravastatin. Hold methimazole as above  - Trend CMP daily  - If worsening, would obtain RUQ US Pt with dry cough i/s/o recent flu infection likely post viral bronchitis  - guaifenesin as needed Pt with dry cough i/s/o recent flu infection likely post viral bronchitis  - tessalon perles as needed Pt with hx of hyperthyroidism. On methimazole  - hold methimazole, call endo consult in AM

## 2022-12-27 NOTE — H&P ADULT - NSHPLABSRESULTS_GEN_ALL_CORE
.  LABS:                         11.0   14.53 )-----------( 185      ( 26 Dec 2022 21:58 )             36.3     12-26    x   |  x   |  x   ----------------------------<  x   4.8   |  x   |  x     Ca    7.9<L>      26 Dec 2022 21:58    TPro  7.4  /  Alb  2.5<L>  /  TBili  0.4  /  DBili  x   /  AST  63<H>  /  ALT  32  /  AlkPhos  163<H>  12-26                  RADIOLOGY, EKG & ADDITIONAL TESTS: Reviewed. .  LABS:                         11.0   14.53 )-----------( 185      ( 26 Dec 2022 21:58 )             36.3     12-26    x   |  x   |  x   ----------------------------<  x   4.8   |  x   |  x     Ca    7.9<L>      26 Dec 2022 21:58    TPro  7.4  /  Alb  2.5<L>  /  TBili  0.4  /  DBili  x   /  AST  63<H>  /  ALT  32  /  AlkPhos  163<H>  12-26                  RADIOLOGY, EKG & ADDITIONAL TESTS: Reviewed.    EKG with atrial flutter on admission.   Telemetry: slow afib with occasional pause .  LABS:                         11.0   14.53 )-----------( 185      ( 26 Dec 2022 21:58 )             36.3   Complete Blood Count + Automated Diff (12.26.22 @ 21:58)   Auto Neutrophil #: 12.83 K/uL   Auto Neutrophil %: 88.3%     12-26    x   |  x   |  x   ----------------------------<  x   4.8   |  x   |  x     Ca    7.9<L>      26 Dec 2022 21:58    TPro  7.4  /  Alb  2.5<L>  /  TBili  0.4  /  DBili  x   /  AST  63<H>  /  ALT  32  /  AlkPhos  163<H>  12-26    Thyroid Stimulating Hormone, Serum (12.26.22 @ 21:58)   Thyroid Stimulating Hormone, Serum: 6.17 uIU/mL   T4, Serum (12.26.22 @ 23:20)   T4, Serum: 4.30 ug/dL   Triiodothyronine, Total (T3 Total) (12.26.22 @ 23:20)   Triiodothyronine, Total (T3 Total): 48 ng/dL     Troponin T, High Sensitivity (12.26.22 @ 21:58)   Troponin T, High Sensitivity Result: 15 ng/L     RADIOLOGY, EKG & ADDITIONAL TESTS: Reviewed.  < from: Xray Chest 1 View- PORTABLE-Urgent (12.26.22 @ 18:24) >    FINDINGS:  The lungs are clear.  There is no pleural effusion or pneumothorax.  The heart is normal in size  The visualized osseous structures demonstrate no acute pathology.    IMPRESSION:  Clear lungs.    --- End of Report ---    < end of copied text >    EKG with atrial flutter on admission. QT interval read at 530 but on manual measurement in lead II it is 444 (erroneous read likely due to overlap between P and T waves). no significant ST-T wave changes but lead aVF poor.   Telemetry: slow afib with occasional pause

## 2022-12-27 NOTE — PROGRESS NOTE ADULT - ATTENDING COMMENTS
Seen and examined by me this afternoon, daughter at bedside, patient seems to be tired, +ongoing cough from recent Influenza  Syncope, found to have pauses on monitor, sotalol being held at this time  Patient remains on A.Fib-permanent atrial fibrillation, c/w xarelto  EP f/up appreciated  F/up TTE  Elevated TSH-holding methimazole at this time, Endocrine to see pt given h/o hyperthyroidism  At time of visit B/L LE look red (not blue), +pulses, believe what pt has is more likely venous stasis-keep LE's elevated  C/w IV ance for now but likely to be d/c in 1-2 days  F/up CTA as recommended by Vascular for completion  PT eval after CTA has been done  Swallow evaluation  Discussed with daughter at bedside  Rest as above

## 2022-12-27 NOTE — H&P ADULT - ASSESSMENT
87-year-old female past medical history of A. fib (on aspirin and Xarelto) on Sotalol, cholecystectomy, hemorrhoids, colitis, recent admission for bloody bowel movement and sepsis secondary to influenza, presents with syncopal episode i/s/o cough syncope vs cardiac syncope. Tele with slow afib with frequent pauses. Admitted to medicine for further management 87-year-old female past medical history of A. fib (on aspirin and Xarelto) on Sotalol, cholecystectomy, hemorrhoids, colitis, recent admission for bloody bowel movement and sepsis secondary to influenza, presents with syncopal episode i/s/o situational syncope vs cardiac syncope. Tele with slow afib with frequent pauses. Admitted to medicine for further management

## 2022-12-27 NOTE — H&P ADULT - NSHPPHYSICALEXAM_GEN_ALL_CORE
.  VITAL SIGNS:  T(C): 36.1 (12-27-22 @ 02:22), Max: 36.3 (12-26-22 @ 18:13)  T(F): 97 (12-27-22 @ 02:22), Max: 97.4 (12-26-22 @ 18:13)  HR: 56 (12-27-22 @ 04:12) (49 - 89)  BP: 92/67 (12-27-22 @ 04:12) (92/67 - 121/98)  BP(mean): --  RR: 14 (12-27-22 @ 04:12) (12 - 20)  SpO2: 97% (12-27-22 @ 04:12) (94% - 98%)  Wt(kg): --    PHYSICAL EXAM:    Constitutional: WDWN resting comfortably in bed; NAD  Head: NC/AT  Eyes: PERRL, EOMI, anicteric sclera  ENT: no nasal discharge; uvula midline, no oropharyngeal erythema or exudates; MMM  Neck: supple; no JVD or thyromegaly  Respiratory: CTA B/L; no W/R/R, no retractions  Cardiac: +S1/S2; RRR; no M/R/G; PMI non-displaced  Gastrointestinal: soft, NT/ND; no rebound or guarding; +BSx4  Genitourinary: normal external genitalia  Back: spine midline, no bony tenderness or step-offs; no CVAT B/L  Extremities: WWP, no clubbing or cyanosis; no peripheral edema. Capillary refill <2 sec  Musculoskeletal: NROM x4; no joint swelling, tenderness or erythema  Vascular: 2+ radial, femoral, DP/PT pulses B/L  Dermatologic: skin warm, dry and intact; no rashes, wounds, or scars  Lymphatic: no submandibular or cervical LAD  Neurologic: AAOx3; CNII-XII grossly intact; no focal deficits  Psychiatric: affect and characteristics of appearance, verbalizations, behaviors are appropriate .  VITAL SIGNS:  T(C): 36.1 (12-27-22 @ 02:22), Max: 36.3 (12-26-22 @ 18:13)  T(F): 97 (12-27-22 @ 02:22), Max: 97.4 (12-26-22 @ 18:13)  HR: 56 (12-27-22 @ 04:12) (49 - 89)  BP: 92/67 (12-27-22 @ 04:12) (92/67 - 121/98)  BP(mean): --  RR: 14 (12-27-22 @ 04:12) (12 - 20)  SpO2: 97% (12-27-22 @ 04:12) (94% - 98%)  Wt(kg): --    PHYSICAL EXAM:    Constitutional: WDWN resting comfortably in bed; NAD  Head: NC/AT  Eyes: PERRL, EOMI, anicteric sclera  ENT: no nasal discharge, MMM  Neck: supple; no JVD  Respiratory: CTA B/L; no W/R/R, no retractions  Cardiac: +S1/S2; bradycardic. irregularly irregular  Gastrointestinal: soft, NT/ND; no rebound or guarding; +BSx4  Back: spine midline, no bony tenderness  Extremities: RLE cold, bluish discoloration. LLE warm and erythematous  Musculoskeletal: RLE ankle ROM restricted  Vascular: 2+ radial. diminish pulses in LE  Dermatologic: skin dry, blue discoloration in RLE. LLE with erythema and warmth  Lymphatic: no submandibular or cervical LAD  Neurologic: AAOx2; CNII-XII grossly intact; no focal deficits  Psychiatric: affect and characteristics of appearance, verbalizations, behaviors are appropriate .  VITAL SIGNS:  T(C): 36.1 (12-27-22 @ 02:22), Max: 36.3 (12-26-22 @ 18:13)  T(F): 97 (12-27-22 @ 02:22), Max: 97.4 (12-26-22 @ 18:13)  HR: 56 (12-27-22 @ 04:12) (49 - 89)  BP: 92/67 (12-27-22 @ 04:12) (92/67 - 121/98)  BP(mean): --  RR: 14 (12-27-22 @ 04:12) (12 - 20)  SpO2: 97% (12-27-22 @ 04:12) (94% - 98%)  Wt(kg): --    PHYSICAL EXAM:    Constitutional: WDWN resting comfortably in bed; NAD  Head: NC/AT  Eyes: PERRL, EOMI, anicteric sclera  ENT: no nasal discharge, MMM  Neck: supple; no JVD  Respiratory: CTA B/L; no W/R/R, no retractions  Cardiac: +S1/S2; bradycardic. irregularly irregular  Gastrointestinal: soft, NT/ND; no rebound or guarding; +BSx4  Back: spine midline, no bony tenderness  Extremities: RLE cold, bluish discoloration. LLE warm and erythematous  Musculoskeletal: RLE ankle ROM restricted  Vascular: 2+ radial. diminish pulses in LE but present  Dermatologic: skin dry, blue discoloration in RLE. LLE with erythema and warmth  Lymphatic: no submandibular or cervical LAD  Neurologic: AAOx2; CNII-XII grossly intact; no focal deficits  Psychiatric: affect and characteristics of appearance, verbalizations, behaviors are appropriate .  VITAL SIGNS:  T(C): 36.1 (12-27-22 @ 02:22), Max: 36.3 (12-26-22 @ 18:13)  T(F): 97 (12-27-22 @ 02:22), Max: 97.4 (12-26-22 @ 18:13)  HR: 56 (12-27-22 @ 04:12) (49 - 89)  BP: 92/67 (12-27-22 @ 04:12) (92/67 - 121/98)  BP(mean): --  RR: 14 (12-27-22 @ 04:12) (12 - 20)  SpO2: 97% (12-27-22 @ 04:12) (94% - 98%)  Wt(kg): --    PHYSICAL EXAM:    Constitutional: WDWN resting comfortably in bed; NAD  Head: NC/AT  Eyes: PERRL, EOMI, anicteric sclera  ENT: no nasal discharge, MMM  Neck: supple; no JVD  Respiratory: +End expiratory wheezing b/l, no crackles  Cardiac: +S1/S2; bradycardic. irregularly irregular, +GODFREY  Gastrointestinal: soft, NT/ND; no rebound or guarding; +BSx4  Back: spine midline, no bony tenderness  Extremities: RLE cold, bluish discoloration. LLE warm and erythematous and painful to palpation.  Musculoskeletal: RLE ankle ROM restricted  Vascular: 2+ radial. diminish pulses in b/l LE but present  Dermatologic: +sacral ulceration with tunneling  Lymphatic: no submandibular or cervical LAD  Neurologic: AAOx2; CNII-XII grossly intact; no focal deficits  Psychiatric: affect and characteristics of appearance, verbalizations, behaviors are appropriate

## 2022-12-27 NOTE — PROGRESS NOTE ADULT - PROBLEM SELECTOR PLAN 1
Pt presenting with syncopal episode i/s/o earing suggestive of situational syncope/vasovagal i/s/o swallowing.Tele with slow Afib with episodes of pauses raising concern for cardiac syncope. Hypothyroidism and metabolic causes also on differential  - EP following, appreciate recs  - Monitor on Tele  - Hold sotalol  - Pt with elevated TSH (6.17) and low T4 (4.3) i/s/o methimazole use; hold methimazole  - Replete electrolytes as appropriate Pt presenting with syncopal episode i/s/o earing suggestive of situational syncope/vasovagal i/s/o swallowing.Tele with slow Afib with episodes of pauses raising concern for cardiac syncope. Hypothyroidism and metabolic causes also on differential  - EP following, appreciate recs  - Monitor on Tele  - Hold sotalol  - Pt with elevated TSH (6.17) and low T4 (4.3) i/s/o methimazole use; hold methimazole  - replete lytes as needed

## 2022-12-27 NOTE — H&P ADULT - HISTORY OF PRESENT ILLNESS
87-year-old female past medical history of A. fib (on aspirin and Xarelto) on Sotalol, cholecystectomy, hemorrhoids, colitis, recent admission for bloody bowel movement and sepsis secondary to influenza, presents with syncopal episode. Per daughter, patient was being fed, when she had a coughing fit and then had brief syncopal episode before regaining consciousness. Patient brought to the ED for eval. Patient at baseline per daughter at this time and offers no acute complaints.    In the ED, noted to have slow Afib on monitor with episodes of pauses occurring on telemetry. Pt with elevated TSH and hyperkalemia on admission EP consulted, recommended holding Sotalol. Admitted to medicine for further monitoring 87-year-old female past medical history of A. fib (on aspirin and Xarelto) on Sotalol, cholecystectomy, hemorrhoids, colitis, recent admission for bloody bowel movement and sepsis secondary to influenza, presents with syncopal episode. Per daughter, patient was being fed applesauce, and then had brief syncopal episode. Daughter notes that her eyes rolled back and she became pale.  She regained consciousness 30-45sec while daughter was on phone with EMS. Denies post syncopal confusion. Patient brought to the ED for eval. Patient at baseline per daughter at this time. Pt denies any complaints    On evaluation, noted pt with LLE erythema and warmth. Daughter states that this is new. Pt's extremities are normally cold. Pt with daily cough, states that this has been happening since discharge for flu. Denies any recent fevers, chills, HA, myalgia, n/v/d, abdominal pain, or any other sx    In the ED, noted to have slow Afib on monitor with episodes of pauses occurring on telemetry. Pt with elevated TSH and hyperkalemia on admission EP consulted, recommended holding Sotalol. Admitted to medicine for further monitoring 87-year-old female past medical history of A. fib (on aspirin and Xarelto) on Sotalol, Hyperthyroidism, HLD, cholecystectomy, hemorrhoids, colitis, recent admission for bloody bowel movement and sepsis secondary to influenza, presents with syncopal episode. Per daughter, patient was being fed applesauce, and then had brief syncopal episode. Daughter notes that her eyes rolled back and she became pale.  She regained consciousness 30-45sec while daughter was on phone with EMS. Denied the patient having any extremity shaking, tongue biting, head strike, or incontinence of urine/feces. Denies post syncopal confusion. Patient brought to the ED for eval. Patient at baseline per daughter at this time. Pt denies any complaints    On evaluation, noted pt with LLE erythema and warmth. Daughter states that this is new. Pt's extremities are normally cold. Also noted to have a blue and cold R foot which the daughter said happens every now any then. Pt with daily cough, states that this has been happening since discharge for flu. Denies any recent fevers, chills, HA, myalgia, n/v/d, abdominal pain, or any other sx.    In the ED, noted to have slow Afib on monitor with episodes of pauses occurring on telemetry. Pt with elevated TSH and hyperkalemia on admission EP consulted, recommended holding Sotalol. Admitted to medicine for further monitoring

## 2022-12-27 NOTE — PROGRESS NOTE ADULT - SUBJECTIVE AND OBJECTIVE BOX
Daughter at the bedside     Subjective:  Denies HA, lightheadedness, dizziness, CP, palpitation, and SOB    Interval events:  - p/w after a syncopal event when eatting applesauce, Daughter notes that her eyes rolled back and she became pale.  She regained consciousness 30-45sec while daughter was on phone with EMS. In the ED patient was in Afib HR 30-60s with a 3.7 seconds pause.  - Patient was started on antibiotics for possible cellulitis of the LLE.   - Ep consulted for syncope. Labs significant for hyperkalemia, elevated TSH and WBC, and low T3 and FT4. per team holding methimazole.  - Vascular consulted for cyanotic LE. Per Vascular note, patient has a weak but palpable PT and DP pulse. They recommend CT angio abdomen and pelvis with IV contrast with aortic runoff to B/l feet, CT scan protocoled with radiology    MEDICATIONS  (STANDING):  albuterol/ipratropium for Nebulization 3 milliLiter(s) Nebulizer every 6 hours  ascorbic acid 500 milliGRAM(s) Oral daily  aspirin enteric coated 81 milliGRAM(s) Oral daily  ceFAZolin   IVPB      ceFAZolin   IVPB 1000 milliGRAM(s) IV Intermittent every 8 hours  multivitamin 1 Tablet(s) Oral daily  polyethylene glycol 3350 17 Gram(s) Oral two times a day  rivaroxaban 15 milliGRAM(s) Oral with dinner    MEDICATIONS  (PRN):  benzonatate 100 milliGRAM(s) Oral every 8 hours PRN Cough  bisacodyl 5 milliGRAM(s) Oral every 12 hours PRN Constipation      Vital Signs Last 24 Hrs  T(C): 36.2 (27 Dec 2022 07:35), Max: 36.3 (26 Dec 2022 18:13)  T(F): 97.2 (27 Dec 2022 07:35), Max: 97.4 (26 Dec 2022 18:13)  HR: 77 (27 Dec 2022 07:35) (49 - 89)  BP: 105/67 (27 Dec 2022 07:35) (92/67 - 121/98)  BP(mean): --  RR: 15 (27 Dec 2022 07:35) (12 - 20)  SpO2: 96% (27 Dec 2022 07:35) (94% - 98%)    Parameters below as of 27 Dec 2022 07:35  Patient On (Oxygen Delivery Method): room air      I&O's Detail      Physical Exam:  GENERAL: Lying in bed, in NAD  HEART: S1S2 irregularly irregular  PULMONARY: CTABL, normal respiratory effort.  ABDOMEN: + Bowel sounds present, soft  EXTREMITIES:  Warm, well -perfused, RLE cold, pale cyanotic, RLE warm and erythematous  distal pulses present    TELEMETERIC: Afib HR 30-60s                            11.0   14.53 )-----------( 185      ( 26 Dec 2022 21:58 )             36.3       12-26    x   |  x   |  x   ----------------------------<  x   4.8   |  x   |  x     Ca    7.9<L>      26 Dec 2022 21:58    TPro  7.4  /  Alb  2.5<L>  /  TBili  0.4  /  DBili  x   /  AST  63<H>  /  ALT  32  /  AlkPhos  163<H>  12-26

## 2022-12-27 NOTE — H&P ADULT - NSHPSOCIALHISTORY_GEN_ALL_CORE
and lives with daughter and granddaughter. Retired . Typically walks with walker.  and lives with daughter and granddaughter. Retired . Typically walks with walker. No tobacco, EtOH, or illicit substance use

## 2022-12-27 NOTE — ED ADULT NURSE REASSESSMENT NOTE - NS ED NURSE REASSESS COMMENT FT1
Multiple attempts made to obtain rpt lab - unsuccessful - as per daughter would like a provider to obtain via US - team made aware baldemar payton

## 2022-12-27 NOTE — PROGRESS NOTE ADULT - PROBLEM SELECTOR PLAN 5
Pt with hx of hyperthyroidism. On methimazole  - hold methimazole, call endo consult in AM Pt with hx of hyperthyroidism. On methimazole  - hold methimazole  - endo consult

## 2022-12-27 NOTE — H&P ADULT - PROBLEM SELECTOR PLAN 9
DVT: Xarelto  Diet: NPO as failed dysphagia, S&S. Advance as tolerated  Dispo: Pending clinical improvement DVT: Xarelto  Diet: NPO as failed dysphagia, S&S. Advance as tolerated  Dispo: Pending clinical improvement  Code Status: DNR/DNI, confirmed with daughter at bedside, MOLST in chart

## 2022-12-27 NOTE — ED ADULT NURSE REASSESSMENT NOTE - NS ED NURSE REASSESS COMMENT FT1
Pt report given to [facility] 2 RN - pt in no acute distress denies any discomfort at this time - daughter at bedside pending transport to unit baldemar payton

## 2022-12-27 NOTE — H&P ADULT - NSHPREVIEWOFSYSTEMS_GEN_ALL_CORE
CONSTITUTIONAL: No fever, no chills, no weight loss  EYES: No eye pain, no visual disturbance  RESPIRATORY: No cough, No SOB  CARDIOVASCULAR: No CP, no palpitations  GASTROINTESTINAL: no abdominal pain, no n/v/d  GENITOURINARY: No dysuria, no hematuria  HEME: No easy bruising, no bleeding gums  NEURO: No headaches, no weakness  SKIN: No itching, no rashes  MSK: No joint pain, no joint swelling CONSTITUTIONAL: No fever, no chills, no weight loss  EYES: No eye pain, no visual disturbance  RESPIRATORY: + cough, No SOB  CARDIOVASCULAR: No CP, no palpitations  GASTROINTESTINAL: no abdominal pain, no n/v/d  GENITOURINARY: No dysuria, no hematuria  HEME: No easy bruising, no bleeding gums  NEURO: No headaches, no weakness  SKIN: No itching, no rashes  MSK: No joint pain, no joint swelling CONSTITUTIONAL: No fever, no chills, no weight loss  EYES: No eye pain, no visual disturbance  RESPIRATORY: + cough, no sputum, No SOB  CARDIOVASCULAR: No CP, +palpitations, +syncope, no current dizziness  GASTROINTESTINAL: no abdominal pain, no n/v/d  GENITOURINARY: No dysuria, no hematuria  HEME: No easy bruising, no bleeding gums  NEURO: No headaches, no weakness  SKIN: No itching, no rashes  MSK: No joint pain, no joint swelling

## 2022-12-27 NOTE — H&P ADULT - PROBLEM SELECTOR PLAN 1
Pt presenting with syncopal episode i/s/o coughing fit consistent with cough syncope/vasovagal syncope. Tele with slow Afib with episodes of pauses raising concern for cardiac syncope. Hypothyroidism and metabolic causes also on differential  - EP following, appreciate recs  - Monitor on Tele  - Hold sotalol  - Pt with elevated TSH (6.17) and low T4 (4.3) i/s/o methimazole use; hold methimazole  - Replete electrolytes as appropriate Pt presenting with syncopal episode i/s/o earing suggestive of situational syncope/vasovagal i/s/o swallowing.Tele with slow Afib with episodes of pauses raising concern for cardiac syncope. Hypothyroidism and metabolic causes also on differential  - EP following, appreciate recs  - Monitor on Tele  - Hold sotalol  - Pt with elevated TSH (6.17) and low T4 (4.3) i/s/o methimazole use; hold methimazole  - Replete electrolytes as appropriate Pt presenting with syncopal episode i/s/o earing suggestive of situational syncope/vasovagal i/s/o swallowing. Tele with slow Afib with episodes of pauses raising concern for cardiac syncope. Hypothyroidism and metabolic causes also on differential  - EP following, appreciate recs  - Monitor on Tele  - Hold sotalol  - Pt with elevated TSH (6.17) and low T4 (4.3) i/s/o methimazole use; hold methimazole  - Replete electrolytes as appropriate  - Repeat EKG for QT re-evaluation  - Check TTE, has GODFREY, f/u TTE for further eval

## 2022-12-27 NOTE — PATIENT PROFILE ADULT - FUNCTIONAL ASSESSMENT - BASIC MOBILITY 4.
1 = Total assistance Chronic Obstructive Pulmonary Disease    Chronic obstructive pulmonary disease (COPD) is a lung condition in which airflow from the lungs is limited. Causes include smoking, secondhand smoke exposure, genetics, or recurrent infections. Take all medicines (inhaled or pills) as directed by your health care provider. Avoid exposure to irritants such as smoke, chemicals, and fumes that aggravate your breathing.    If you are a smoker, the most important thing that you can do is stop smoking. Continuing to smoke will cause further lung damage and breathing trouble. Ask your health care provider for help with quitting smoking.    SEEK IMMEDIATE MEDICAL CARE IF YOU HAVE ANY OF THE FOLLOWING SYMPTOMS: shortness of breath at rest or when talking, bluish discoloration of lips, skin, fever, worsening cough, unexplained chest pain, or lightheadedness/dizziness.

## 2022-12-27 NOTE — PROGRESS NOTE ADULT - ASSESSMENT
This is a 87-year-old woman with a pmhx A. fib  (Xarelto and Sotalol), cholecystectomy, hemorrhoids, colitis, recent admission for bloody bowel movement and sepsis secondary to influenza who presented with a syncopal episode. Per daughter, patient was being fed and then had brief syncopal episode before regaining consciousness. Patient brought to the ED for eval. In the ED, noted to have slow Afib on monitor with episodes of pauses occurring on telemetry review. Labs significant for hyperkalemia and elevated TSH and low T3 and FT4.    Plan:  - Continuous telemetric monitoring  - Monitor electrolytes and replete K to 4 and Mg to 2  - TTE  - Continue rivaroxaban for thromboembolic ppx  given that patient has a [id number] score of 3  - Continue care per primary team    Bebe Landry PA-c  Patient to be staff with attending. Please await attending addendum    This is a 87-year-old woman with a pmhx A. fib  (Xarelto and Sotalol), cholecystectomy, hemorrhoids, colitis, recent admission for bloody bowel movement and sepsis secondary to influenza who presented with a syncopal episode. Per daughter, patient was being fed and then had brief syncopal episode before regaining consciousness. Patient brought to the ED for eval. In the ED, noted to have slow Afib on monitor with episodes of pauses occurring on telemetry review. Labs significant for hyperkalemia and elevated TSH and low T3 and FT4.    Plan:  - Continuous telemetric monitoring  - Monitor electrolytes and replete K to 4 and Mg to 2  - Obtain  TTE  - Continue rivaroxaban for thromboembolic ppx  given that patient has a [id number] score of 3  - Discontinue home sotalol   - Continue care per primary team    Bebe Landry PA-c  Patient to be staff with attending. Please await attending addendum    This is a 87-year-old woman with a pmhx A. fib  (Xarelto and Sotalol), cholecystectomy, hemorrhoids, colitis, recent admission for bloody bowel movement and sepsis secondary to influenza who presented with a syncopal episode. Per daughter, patient was being fed and then had brief syncopal episode before regaining consciousness. Patient brought to the ED for eval. In the ED, noted to have slow Afib on monitor with episodes of pauses occurring on telemetry review. Labs significant for hyperkalemia and elevated TSH and low T3 and FT4. Continue rivaroxaban for thromboembolic ppx  given that patient has a [id number] score of 3. Discontinue home sotalol due to concern about pause dependent VT given bradyarrhythmias.

## 2022-12-27 NOTE — PROGRESS NOTE ADULT - PROBLEM SELECTOR PLAN 6
Pt with dry cough i/s/o recent flu infection likely post viral bronchitis  - tessalon perles as needed

## 2022-12-27 NOTE — PROGRESS NOTE ADULT - SUBJECTIVE AND OBJECTIVE BOX
DATE OF SERVICE: 12-27-22 @ 07:20    Patient is a 87y old  Female who presents with a chief complaint of Syncope (27 Dec 2022 06:48)      SUBJECTIVE / OVERNIGHT EVENTS:  Overnight, admitted for syncope. Tele showing slow Afib with episodes of pauses. Holding sotalol. Also with with hypothyroidism, holding methimazole.   Pt seen and examined at bedside.    ROS negative except as above.    MEDICATIONS  (STANDING):  ascorbic acid 500 milliGRAM(s) Oral daily  aspirin enteric coated 81 milliGRAM(s) Oral daily  multivitamin 1 Tablet(s) Oral daily  polyethylene glycol 3350 17 Gram(s) Oral two times a day  rivaroxaban 15 milliGRAM(s) Oral with dinner    MEDICATIONS  (PRN):  benzonatate 100 milliGRAM(s) Oral every 8 hours PRN Cough  bisacodyl 5 milliGRAM(s) Oral every 12 hours PRN Constipation      Vital Signs Last 24 Hrs  T(C): 36.1 (27 Dec 2022 02:22), Max: 36.3 (26 Dec 2022 18:13)  T(F): 97 (27 Dec 2022 02:22), Max: 97.4 (26 Dec 2022 18:13)  HR: 56 (27 Dec 2022 04:12) (49 - 89)  BP: 92/67 (27 Dec 2022 04:12) (92/67 - 121/98)  BP(mean): --  RR: 14 (27 Dec 2022 04:12) (12 - 20)  SpO2: 97% (27 Dec 2022 04:12) (94% - 98%)    Parameters below as of 27 Dec 2022 04:12  Patient On (Oxygen Delivery Method): room air      CAPILLARY BLOOD GLUCOSE        I&O's Summary      PHYSICAL EXAM:  GENERAL: NAD, well-developed  HEAD:  Atraumatic, Normocephalic  EYES: EOMI, PERRLA, conjunctiva and sclera clear  NECK: Supple, No JVD  CHEST/LUNG: Clear to auscultation bilaterally; No wheeze  HEART: Regular rate and rhythm; No murmurs, rubs, or gallops  ABDOMEN: Soft, Nontender, Nondistended; Bowel sounds present  EXTREMITIES:  2+ Peripheral Pulses, No clubbing, cyanosis, or edema  PSYCH: AAOx3  NEUROLOGY: non-focal  SKIN: No rashes or lesions    LABS:                        11.0   14.53 )-----------( 185      ( 26 Dec 2022 21:58 )             36.3     12-26    x   |  x   |  x   ----------------------------<  x   4.8   |  x   |  x     Ca    7.9<L>      26 Dec 2022 21:58    TPro  7.4  /  Alb  2.5<L>  /  TBili  0.4  /  DBili  x   /  AST  63<H>  /  ALT  32  /  AlkPhos  163<H>  12-26              MICRO:      RADIOLOGY & ADDITIONAL TESTS:      Consultant(s) Notes Reviewed:         DATE OF SERVICE: 12-27-22 @ 07:20    Patient is a 87y old  Female who presents with a chief complaint of Syncope (27 Dec 2022 06:48)      SUBJECTIVE / OVERNIGHT EVENTS:  Overnight, admitted for syncope. Tele showing slow Afib with episodes of pauses. Holding sotalol. Also with with hypothyroidism, holding methimazole.   Pt seen and examined at bedside. No acute complaints at this time. No dizziness, lightheadedness, lower extremity pain.     ROS negative except as above.    MEDICATIONS  (STANDING):  ascorbic acid 500 milliGRAM(s) Oral daily  aspirin enteric coated 81 milliGRAM(s) Oral daily  multivitamin 1 Tablet(s) Oral daily  polyethylene glycol 3350 17 Gram(s) Oral two times a day  rivaroxaban 15 milliGRAM(s) Oral with dinner    MEDICATIONS  (PRN):  benzonatate 100 milliGRAM(s) Oral every 8 hours PRN Cough  bisacodyl 5 milliGRAM(s) Oral every 12 hours PRN Constipation      Vital Signs Last 24 Hrs  T(C): 36.1 (27 Dec 2022 02:22), Max: 36.3 (26 Dec 2022 18:13)  T(F): 97 (27 Dec 2022 02:22), Max: 97.4 (26 Dec 2022 18:13)  HR: 56 (27 Dec 2022 04:12) (49 - 89)  BP: 92/67 (27 Dec 2022 04:12) (92/67 - 121/98)  BP(mean): --  RR: 14 (27 Dec 2022 04:12) (12 - 20)  SpO2: 97% (27 Dec 2022 04:12) (94% - 98%)    Parameters below as of 27 Dec 2022 04:12  Patient On (Oxygen Delivery Method): room air      CAPILLARY BLOOD GLUCOSE        I&O's Summary      PHYSICAL EXAM:  GENERAL: frail appearing, elderly woman, NAD  HEAD:  Atraumatic, Normocephalic  EYES: EOMI, PERRLA, conjunctiva and sclera clear  NECK: Supple, No JVD  CHEST/LUNG: Clear to auscultation bilaterally; No wheeze  HEART: Regular rate and rhythm; No murmurs, rubs, or gallops  ABDOMEN: Soft, Nontender, Nondistended; Bowel sounds present  EXTREMITIES:  LLE with erythema, warmth, and swelling in foot and ankle. No area of skin breakdown. RLE with blue/purple tinge up to level of ankle, above with area of erythema. diminished pulses however intact movement and sensation.    PSYCH: AAOx3  NEUROLOGY: non-focal  SKIN: No rashes or lesions    LABS:                        11.0   14.53 )-----------( 185      ( 26 Dec 2022 21:58 )             36.3     12-26    x   |  x   |  x   ----------------------------<  x   4.8   |  x   |  x     Ca    7.9<L>      26 Dec 2022 21:58    TPro  7.4  /  Alb  2.5<L>  /  TBili  0.4  /  DBili  x   /  AST  63<H>  /  ALT  32  /  AlkPhos  163<H>  12-26              MICRO:      RADIOLOGY & ADDITIONAL TESTS:      Consultant(s) Notes Reviewed:

## 2022-12-27 NOTE — H&P ADULT - PROBLEM SELECTOR PLAN 2
Pt with hx of Afib on ASA and xarelto. Sotalol for rhythm control  - EP following, appreciate recs  - monitor on tele  - Hold sotalol  - c/w ASA/Xarelto

## 2022-12-27 NOTE — PATIENT PROFILE ADULT - FALL HARM RISK - HARM RISK INTERVENTIONS

## 2022-12-27 NOTE — ED ADULT NURSE REASSESSMENT NOTE - NS ED NURSE REASSESS COMMENT FT1
Patient resting on stretcher. Safety maintained. MD Landeros reviewed the patient at bedside. Noted the her right foot is turning bluish colored and appears cold to touch.  Vascular team made aware.  Attempted to draw the morning labs unable to bleed the patient. MD Landeros and MD Goncalves made aware about this.

## 2022-12-27 NOTE — PROGRESS NOTE ADULT - PROBLEM SELECTOR PLAN 2
Pt with hx of Afib on ASA and xarelto. Sotalol for rhythm control  - EP following, appreciate recs  - monitor on tele  - Hold sotalol  - c/w ASA/Xarelto Pt with hx of Afib on ASA and xarelto. Sotalol for rhythm control  - EP following, appreciate recs  - monitor on tele  - Hold sotalol  - c/w ASA/Xarelto given CHADSVASC score of 3  - Keep K > 4, Mg > 2

## 2022-12-27 NOTE — H&P ADULT - NSICDXPASTMEDICALHX_GEN_ALL_CORE_FT
Breath sounds clear and equal bilaterally.
PAST MEDICAL HISTORY:  Atrial fibrillation     Colon cancer     Hemorrhoids     HLD (hyperlipidemia)     Hypothyroidism     UTI (urinary tract infection)

## 2022-12-27 NOTE — H&P ADULT - PROBLEM SELECTOR PLAN 4
Pt with hx of hemorrhoidal bleeding, On miralax at home  - c/w with miralax. Will have dulcolax PRN available Pt with hx of hyperthyroidism. On methimazole  - hold methimazole, call endo consult in AM Pt with leukocytosis, with warmth and erythematous LLE raising c/f cellulitis. Per daughter, this is new. No purulence or drainage  - Will start cefazolin 1g q8  - Trend WBC, monitor for fever. If not improving or worsening clinical status, will start coverage for MRSA Pt with leukocytosis, with warmth and erythematous LLE raising c/f cellulitis. Per daughter, this is new. No purulence or drainage  - Will start cefazolin 1g q8  - Trend WBC, monitor for fever. If not improving or worsening clinical status, will start coverage for MRSA    #Pressure Ulcer  - Has stage 3 sacral pressure ulcer, being managed with wound care at home, currently does not look infected, will obtain would care eval

## 2022-12-27 NOTE — ED ADULT NURSE REASSESSMENT NOTE - NS ED NURSE REASSESS COMMENT FT1
Pt report received, pt in no acute distress denies any discomfort at this time - remains on monitor and zoll , daughter at bedside - pending bed placement pt admitted to [facility]

## 2022-12-27 NOTE — CONSULT NOTE ADULT - ATTENDING COMMENTS
Patient seen/examined. 87F p/w syncope. Vascular surgery consulted for erythema of lower extremities and blue discoloration of right foot. On exam, sensorimotor function is preserved. Left DP pulse is palpable. Right pedal signals only. Patient's daughter reports Xarelto is being held. Please resume anticoagulation unless there is a contraindication to do so, obtain CTA aorta runoff, syncope workup per primary team.

## 2022-12-27 NOTE — PATIENT PROFILE ADULT - FUNCTIONAL ASSESSMENT - BASIC MOBILITY 6.
1-calculated by average/Not able to assess (calculate score using [facility] averaging method)

## 2022-12-27 NOTE — PROGRESS NOTE ADULT - PROBLEM SELECTOR PLAN 7
Pt with hx of hemorrhoidal bleeding, On miralax at home  - c/w with miralax. Will have dulcolax PRN available

## 2022-12-27 NOTE — H&P ADULT - ATTENDING COMMENTS
Patient seen and examined on 12/27/22, case discussed with Dr. Costa Orellana. This is an 87F with history as above who presents to the hospital with an episode of syncope here found to have slow AFib with pauses as long as 3-4 seconds. Patient with complaints of occasional palpitations but no dizziness, no chest pain, no other acute complaints. Still with a persistent cough but no sputum production, no SOB complaints. On examination noted have irregular HR with +GODFREY, +LLE warmth, erythema, and TTP and RLE cold foot with blue discoloration. Has palpable DP/PT pulses but soft. Also with end expiratory wheezing b/l. EP and vascular surgery evaluation appreciated. Will hold sotalol, monitor on telemetry, check TTE, check CTA Aorta with b/l run off (as per vascular surgery), hold methimazole. Start cefazolin for her LLE cellulitis. f/u further EP and vascular surgery recs. Other management as above.

## 2022-12-27 NOTE — H&P ADULT - PROBLEM SELECTOR PLAN 7
DVT: Xarelto  Diet:  Dispo: Pending clinical improvement Pt with elevated AST and alkphos likely i/s/o methimazole and statin use  - Hold pravastatin. Hold methimazole as above  - Trend CMP daily  - If worsening, would obtain RUQ US Pt with hx of hemorrhoidal bleeding, On miralax at home  - c/w with miralax. Will have dulcolax PRN available

## 2022-12-27 NOTE — H&P ADULT - PROBLEM SELECTOR PLAN 3
Pt with hx of hyperthyroidism. On methimazole  - hold methimazole, call endo consult in AM Pt with leukocytosis, with warmth and erythematous RLE raising c/f cellulitis. Per daughter, this is new. No purulence or drainage  - Will start cefazolin 1g q8  - Trend WBC, monitor for fever. If not improving or worsening clinical status, will start coverage for MRSA Pt with leukocytosis, with warmth and erythematous LLE raising c/f cellulitis. Per daughter, this is new. No purulence or drainage  - Will start cefazolin 1g q8  - Trend WBC, monitor for fever. If not improving or worsening clinical status, will start coverage for MRSA LLE with blue discoloration, cold. Hx of Afib c/f emboli. Pulses diminished but present  - Will obtain vascular eval RLE with blue discoloration, cold. Hx of Afib c/f emboli. Pulses diminished but present  - Will obtain vascular eval

## 2022-12-28 NOTE — CONSULT NOTE ADULT - SUBJECTIVE AND OBJECTIVE BOX
Patient is a 87y old  Female who presents with a chief complaint of Syncope  past medical history of A. fib (on aspirin and Xarelto) on Sotalol, Hyperthyroidism, HLD, cholecystectomy, hemorrhoids, colitis, recent admission for bloody bowel movement and sepsis secondary to influenza, presents with syncopal episode. Per daughter, patient was being fed applesauce, and then had brief syncopal episode. Daughter notes that her eyes rolled back and she became pale.  She regained consciousness 30-45sec while daughter was on phone with EMS. Denied the patient having any extremity shaking, tongue biting, head strike, or incontinence of urine/feces. Denies post syncopal confusion. Patient brought to the ED for eval. Patient at baseline per daughter at this time.    On evaluation, noted pt with LLE erythema and warmth. Daughter states that this is new. Pt's extremities are normally cold. Also noted to have a blue and cold R foot which the daughter said happens every now any then. Pt with daily cough, states that this has been happening since discharge for flu. Denies any recent fevers, chills, HA, myalgia, n/v/d, abdominal pain, or any other sx. In the ED, noted to have slow Afib on monitor with episodes of pauses occurring on telemetry. Pt with elevated TSH and hyperkalemia on admission EP consulted, recommended holding Sotalol. Admitted to medicine for further monitoring (27 Dec 2022 04:22)       REVIEW OF SYSTEMSsee HPI and PMH others neg  Allergies    No Known Allergies    Intolerances       MEDICATIONS  (STANDING):  albuterol/ipratropium for Nebulization 3 milliLiter(s) Nebulizer every 6 hours  ascorbic acid 500 milliGRAM(s) Oral daily  aspirin enteric coated 81 milliGRAM(s) Oral daily  benzonatate 100 milliGRAM(s) Oral every 8 hours  collagenase Ointment 1 Application(s) Topical daily  dextrose 5% + lactated ringers. 1000 milliLiter(s) (75 mL/Hr) IV Continuous <Continuous>  guaiFENesin Oral Liquid (Sugar-Free) 100 milliGRAM(s) Oral every 6 hours  multivitamin 1 Tablet(s) Oral daily  polyethylene glycol 3350 17 Gram(s) Oral two times a day  rivaroxaban 15 milliGRAM(s) Oral with dinner    MEDICATIONS  (PRN):  bisacodyl 5 milliGRAM(s) Oral every 12 hours PRN Constipation      FAMILY HISTORY:no skin issues      Social history:dnr    PAST MEDICAL & SURGICAL HISTORY:  Atrial fibrillation      UTI (urinary tract infection)  Hypothyroidism  Colon cancer  Hemorrhoids  HLD (hyperlipidemia)  History of cholecystectomy  Perforated appendicitis  History of cataract surgery      I&O's Summary    28 Dec 2022 07:01  -  28 Dec 2022 19:33  --------------------------------------------------------  IN: 1668.8 mL / OUT: 700 mL / NET: 968.8 mL        Vital Signs Last 24 Hrs  T(C): 36.4 (28 Dec 2022 16:48), Max: 36.6 (28 Dec 2022 04:30)  T(F): 97.6 (28 Dec 2022 16:48), Max: 97.8 (28 Dec 2022 04:30)  HR: 77 (28 Dec 2022 16:48) (60 - 87)  BP: 108/71 (28 Dec 2022 16:48) (95/60 - 132/80)  BP(mean): --  RR: 18 (28 Dec 2022 16:48) (16 - 20)  SpO2: 100% (28 Dec 2022 16:48) (99% - 100%)    Parameters below as of 28 Dec 2022 16:48  Patient On (Oxygen Delivery Method): room air              PHYSICAL EXAM:   Constitutional: awake , moans  ENMT: non icteric  Back: sacral  stage fascia seen 2.5x1.5x1.7cm un7-9ok .8cm, 1-3ock 1.5cm ( previously 2.5.x1  x.8, undermined7-12 9ock 1.5cm , 12ock 1.2cm)  Respiratory:clear  Cardiovascular:rrr  Gastrointestinal: non tender   purewick  Extremities: non tender, 3rd  right toe cyanosis , 4th toe plantar surface, edema r>l  Skin: as noted  Musculoskeletal:  weak  psych calm  has walker and hospital bed at home        CBC Full  -  ( 28 Dec 2022 07:35 )  WBC Count : 11.43 K/uL  RBC Count : 3.23 M/uL  Hemoglobin : 9.2 g/dL  Hematocrit : 30.5 %  Platelet Count - Automated : 152 K/uL  Mean Cell Volume : 94.4 fL  Mean Cell Hemoglobin : 28.5 pg  Mean Cell Hemoglobin Concentration : 30.2 gm/dL  Auto Neutrophil # : 9.60 K/uL  Auto Lymphocyte # : 1.12 K/uL  Auto Monocyte # : 0.63 K/uL  Auto Eosinophil # : 0.00 K/uL  Auto Basophil # : 0.02 K/uL  Auto Neutrophil % : 84.0 %  Auto Lymphocyte % : 9.8 %  Auto Monocyte % : 5.5 %  Auto Eosinophil % : 0.0 %  Auto Basophil % : 0.2 %      12-28    145  |  108<H>  |  15  ----------------------------<  130<H>  3.7   |  32<H>  |  0.63    Ca    7.7<L>      28 Dec 2022 07:35  Phos  1.4     12-28  Mg     2.10     12-28    TPro  6.0  /  Alb  2.1<L>  /  TBili  0.3  /  DBili  x   /  AST  37<H>  /  ALT  25  /  AlkPhos  163<H>  12-28              Radiology:RIGHT LOWER EXTREMITY:    Mild atherosclerotic calcifications of the internal iliac artery, which   is patent. Common iliac artery, external iliac arteries are also patent.   Mild atherosclerotic calcifications of the femoral artery without   significant stenosis. Mild calcifications of the femoral artery without   significant stenosis. Mild to moderate calcifications of the popliteal   artery with mild stenosis. The trifurcation is patent with heavy   calcifications of the arteries of the lower leg three-vessel runoff.    LEFT LOWER EXTREMITY:    Patent common iliac and external iliac arteries without atherosclerotic   calcifications. Mild atherosclerotic calcifications of the internal iliac   arteries which appear patent. Mild calcifications of femoral artery, deep   femoral artery and the popliteal arteries with mild multilevel stenosis.   Trifurcation is patent with a three-vessel runoff in the lower leg.    ADDITIONAL FINDINGS: Cardiomegaly with reflux of contrast into IVC and   hepatic veins. A new mass-like opacity in the right lower lobe of 4.3 x   3.6 cm. Small periampullary duodenal diverticulum.. Bowel containing   ventral abdominal wall hernias.    IMPRESSION:  Mild atherosclerotic calcifications ofthe extremities without evidence   of occlusion.  A new mass-like opacity in the right lower lobe since 12/9/2022, likely   pneumonia, in the right clinical setting. Short-term follow-up CT is   advised to assess for resolution.      --- End of Report ---      ALESSIO DIAZ MD; Attending Radiologist  This document has been electronically signed. Dec 28 2022  5:20PM    < end of copied text >  < from: US Duplex Venous Lower Ext Ltd, Left (11.25.22 @ 09:02) >  ACC: [id number] EXAM:  US DPLX LWR EXT VEINS LTD LT                          PROCEDURE DATE:  11/25/2022    INTERPRETATION:  CLINICAL INFORMATION: Left lower extremity erythema.    COMPARISON: None available.    TECHNIQUE: Duplex sonography of the LEFT LOWER extremity veins with color   and spectral Doppler, with and without compression. The calf veins could   not be visualized.    FINDINGS:  There is normal compressibility of the left common femoral, femoral and   popliteal veins.  The contralateral common femoral vein is patent.  Doppler examination shows normal spontaneous and phasic flow.  IMPRESSION:  No evidence of left lower extremity deep venous thrombosis.    Calf veins not visualized such that isolated calf DVT is not excluded.  --- End of Report ---  ext >  < from: Xray Chest 1 View- PORTABLE-Urgent (12.26.22 @ 18:24) >  INDINGS:  The lungs are clear.  There is no pleural effusion or pneumothorax.  The heart is normal in size  The visualized osseous structures demonstrate no acute pathology.    IMPRESSION:  Clear lungs.    --- End of Report ---          DUDLEY DARNELL MD; Resident Radiologist  This document has been electronically signed.  EVELINA MARY MD; Attending Radiologist    < end of copied text >  < from: CT Abdomen and Pelvis w/ IV Cont (12.09.22 @ 04:10) >  ACC: [id number] EXAM:  CT ABDOMEN AND PELVIS IC                          PROCEDURE DATE:  12/09/2022          INTERPRETATION:  CLINICAL INFORMATION: Fever, GI bleed.    COMPARISON: None.    CONTRAST/COMPLICATIONS:  IV Contrast: Omnipaque 350  90 cc administered   10 cc discarded  Oral Contrast: NONE  Complications: None reported at time of study completion    PROCEDURE:  CT of the Abdomen and Pelvis was performed.  Sagittal and coronal reformats were performed.    FINDINGS:  LOWER CHEST: Mild dependent atelectasis cardiomegaly. Coronary artery   calcifications. Aortic calcifications..    LIVER: Unchanged punctate hepatic calcification.  BILE DUCTS: Normal caliber.  GALLBLADDER: The visualized.  SPLEEN: Within normal limits.  PANCREAS: Within normal limits.  ADRENALS: Within normal limits.  KIDNEYS/URETERS: Within normal limits.    BLADDER: Within normal limits.  REPRODUCTIVE ORGANS: Uterus and adnexa within normal limits.    BOWEL: No bowel obstruction. Appendix is not visualized. No evidence of   inflammation in the pericecal region.  PERITONEUM: No ascites.  VESSELS: Atherosclerotic changes.    < end of copied text >  < from: CT Angio Abdomen and Pelvis w/ IV Cont (11.27.22 @ 18:06) >  VESSELS: Atherosclerotic calcifications of the aortoiliac tree and   proximal thigh vasculature.    < end of copied text >

## 2022-12-28 NOTE — SWALLOW BEDSIDE ASSESSMENT ADULT - ASR SWALLOW RECOMMEND DIAG
Objective testing NOT warranted at this time given no overt signs of aspiration across PO trials and recent chest imaging 12/26 indicating clear lungs.

## 2022-12-28 NOTE — PROGRESS NOTE ADULT - PROBLEM SELECTOR PLAN 6
Pt with dry cough i/s/o recent flu infection likely post viral bronchitis  - tessalon perles as needed Pt with hx of hemorrhoidal bleeding, On miralax at home  - c/w with miralax. Will have dulcolax PRN available

## 2022-12-28 NOTE — PROGRESS NOTE ADULT - ATTENDING COMMENTS
Seen and examined by me this morning on rounds with the team, looks comfortable, via  patient denied any symptoms (SOB or chest pain or palpitations or dizziness)  Ongoing bradycardia and short episodes of tachycardia (160's) that spontaneously resolve  Syncope, likely due to pauses episodes, sotalol remains on hold due to concern about pause dependent VT given bradyarrythmias  F/up further EP recs  Patient remains on A.Fib-permanent atrial fibrillation, c/w xarelto  TTE is still pending  EP follow up noted, f/up attending recs-  Elevated TSH-holding methimazole at this time, FT4 is on the low side, Endocrine to see pt at some point  B/L LE's look pink today, not blue anymore, low likelihood of cellulitis, more like venous stasis-ok to dc Abx at this time (cefazolin)  CTA shows no e/o afterial occlusion in either LE (prelim reading, f/up official one)  Transaminitis trendind down (AST/Alk-phos)  Passed Swallow eval-minced and moist diet  Discussed with daughter at bedside 12/27  Rest as above Seen and examined by me this morning on rounds with the team, looks comfortable, via  patient denied any symptoms (SOB or chest pain or palpitations or dizziness)  Ongoing bradycardia and short episodes of tachycardia (160's) that spontaneously resolve  Syncope, likely due to pauses episodes, sotalol remains on hold due to concern about pause dependent VT given bradyarrythmias  F/up further EP recs  Patient remains on A.Fib-permanent atrial fibrillation, c/w xarelto  TTE is still pending  EP follow up noted, f/up attending recs-  Elevated TSH-holding methimazole at this time, FT4 is on the low side, Endocrine to see pt at some point  B/L LE's look pink today, not blue anymore, low likelihood of cellulitis, more like venous stasis-ok to dc Abx at this time (cefazolin)  CTA shows no e/o arterial occlusion in either LE (prelim reading, f/up official one)  Transaminitis trending down (AST/Alk-phos)  Passed Swallow eval-minced and moist diet  Discussed with daughter at bedside 12/27  Rest as above

## 2022-12-28 NOTE — PROGRESS NOTE ADULT - PROBLEM SELECTOR PLAN 1
Pt presenting with syncopal episode i/s/o earing suggestive of situational syncope/vasovagal i/s/o swallowing.Tele with slow Afib with episodes of pauses raising concern for cardiac syncope. Hypothyroidism and metabolic causes also on differential  - EP following, appreciate recs  - Monitor on Tele  - Hold sotalol  - Pt with elevated TSH (6.17) and low T4 (4.3) i/s/o methimazole use; hold methimazole  - replete lytes as needed Pt presenting with syncopal episode i/s/o earing suggestive of situational syncope/vasovagal i/s/o swallowing. Tele with slow Afib with episodes of pauses (longest pause 3.1 seconds though asymptomatic, stable BP) raising concern for cardiac syncope. Hypothyroidism and metabolic causes also on differential  - EP following, appreciate recs  - Monitor on Tele  - Hold sotalol  - Pt with elevated TSH (6.17) and low T4 (4.3) i/s/o methimazole use; hold methimazole  - replete lytes as needed  - no further EP intervention at this time

## 2022-12-28 NOTE — SWALLOW BEDSIDE ASSESSMENT ADULT - COMMENTS
Progress Note- Internal Medicine 12/28: "87-year-old female past medical history of A. fib (on aspirin and Xarelto) on Sotalol, cholecystectomy, hemorrhoids, colitis, recent admission for bloody bowel movement and sepsis secondary to influenza, presents with syncopal episode i/s/o situational syncope vs cardiac syncope. Tele with slow afib with frequent pauses. Admitted to medicine for further management."    CR Chest 12/26: "IMPRESSION: Clear lungs."    Patient seen at bedside awake/alert during clinical swallow evaluation this AM with patient's daughter present at bedside. Patient is primarily [place] speaking, [employer] offered however patient's daughter translated due to patient being more receptive to daughter. Patient is able to follow simple commands and make basic needs/wants known.

## 2022-12-28 NOTE — PROGRESS NOTE ADULT - SUBJECTIVE AND OBJECTIVE BOX
Language line solution: [place] [id number] [place]   Subjective:   unable to understand patient    Interval events:  - p/w after a syncopal event when eating applesauce, Daughter notes that her eyes rolled back and she became pale.  She regained consciousness 30-45sec while daughter was on phone with EMS. In the ED patient was in Afib HR 30-60s with a 3.7 seconds pause.  - Patient was started on antibiotics for possible cellulitis of the LLE.   - Ep consulted for syncope. Labs significant for hyperkalemia, elevated TSH and WBC, and low T3 and FT4. Primary team holding methimazole.  - Vascular consulted for cyanotic LE. Per Vascular note, patient has a weak but palpable PT and DP pulse. They recommend CT angio abdomen and pelvis with IV contrast with aortic runoff to B/l feet, CT scan protocoled with radiology      MEDICATIONS  (STANDING):  albuterol/ipratropium for Nebulization 3 milliLiter(s) Nebulizer every 6 hours  ascorbic acid 500 milliGRAM(s) Oral daily  aspirin enteric coated 81 milliGRAM(s) Oral daily  benzonatate 100 milliGRAM(s) Oral every 8 hours  dextrose 5% + lactated ringers. 1000 milliLiter(s) (75 mL/Hr) IV Continuous <Continuous>  guaiFENesin Oral Liquid (Sugar-Free) 100 milliGRAM(s) Oral every 6 hours  multivitamin 1 Tablet(s) Oral daily  polyethylene glycol 3350 17 Gram(s) Oral two times a day  rivaroxaban 15 milliGRAM(s) Oral with dinner    MEDICATIONS  (PRN):  bisacodyl 5 milliGRAM(s) Oral every 12 hours PRN Constipation      Vital Signs Last 24 Hrs  T(C): 36.2 (28 Dec 2022 10:20), Max: 36.6 (27 Dec 2022 13:35)  T(F): 97.2 (28 Dec 2022 10:20), Max: 97.9 (27 Dec 2022 16:49)  HR: 72 (28 Dec 2022 10:20) (57 - 87)  BP: 132/80 (28 Dec 2022 10:20) (92/55 - 139/85)  BP(mean): 98 (27 Dec 2022 18:59) (98 - 98)  RR: 18 (28 Dec 2022 10:20) (14 - 20)  SpO2: 100% (28 Dec 2022 10:20) (95% - 100%)    Parameters below as of 28 Dec 2022 10:20  Patient On (Oxygen Delivery Method): room air      I&O's Detail    Physical Exam:  GENERAL: Lying in bed, in NAD,  unable to understand patient   HEART: S1S2 irregularly irregular  PULMONARY: CTABL, normal respiratory effort.  ABDOMEN: + Bowel sounds present, soft  EXTREMITIES:  Warm, well -perfused, RLE cold, pale cyanotic, RLE warm and erythematous  distal pulses present    TELEMETERIC: Afib HR 30-80s longest pause was 3 seconds                               9.2    11.43 )-----------( 152      ( 28 Dec 2022 07:35 )             30.5       12-28    145  |  108<H>  |  15  ----------------------------<  130<H>  3.7   |  32<H>  |  0.63    Ca    7.7<L>      28 Dec 2022 07:35  Phos  1.4     12-28  Mg     2.10     12-28    TPro  6.0  /  Alb  2.1<L>  /  TBili  0.3  /  DBili  x   /  AST  37<H>  /  ALT  25  /  AlkPhos  163<H>  12-28    CARDIAC MARKERS ( 27 Dec 2022 17:00 )  x     / x     / 34 U/L / x     / x

## 2022-12-28 NOTE — SWALLOW BEDSIDE ASSESSMENT ADULT - CONSISTENCIES ADMINISTERED
3oz/pureed x3/minced & moist 4oz/thin liquid x1/soft & bite-sized 1oz each/moderately thick/mildly thick

## 2022-12-28 NOTE — PROGRESS NOTE ADULT - PROBLEM SELECTOR PLAN 4
Pt with leukocytosis, with warmth and erythematous LLE raising c/f cellulitis. Per daughter, this is new. No purulence or drainage  - Will start cefazolin 1g q8  - Trend WBC, monitor for fever. If not improving or worsening clinical status, will start coverage for MRSA Pt with hx of hyperthyroidism. On methimazole  - hold methimazole  - curbside endo agreeing with holding methimazole, will do full formal consult when pt is closer to discharge to recheck thyroid function and make reccs from there

## 2022-12-28 NOTE — PROGRESS NOTE ADULT - PROBLEM SELECTOR PLAN 8
Pt with elevated AST and alkphos likely i/s/o methimazole and statin use  - Hold pravastatin. Hold methimazole as above  - Trend CMP daily  - If worsening, would obtain RUQ US DVT: Xarelto  Diet: NPO as failed dysphagia, S&S. Advance as tolerated  Dispo: Pending clinical improvement

## 2022-12-28 NOTE — PROGRESS NOTE ADULT - PROBLEM SELECTOR PLAN 2
Pt with hx of Afib on ASA and xarelto. Sotalol for rhythm control  - EP following, appreciate recs  - monitor on tele  - Hold sotalol  - c/w ASA/Xarelto given CHADSVASC score of 3  - Keep K > 4, Mg > 2

## 2022-12-28 NOTE — PROGRESS NOTE ADULT - PROBLEM SELECTOR PLAN 7
Pt with hx of hemorrhoidal bleeding, On miralax at home  - c/w with miralax. Will have dulcolax PRN available Pt with elevated AST and alkphos likely i/s/o methimazole and statin use  - Hold pravastatin. Hold methimazole as above  - Trend CMP daily  - If worsening, would obtain RUQ US

## 2022-12-28 NOTE — PROGRESS NOTE ADULT - SUBJECTIVE AND OBJECTIVE BOX
DATE OF SERVICE: 12-28-22 @ 07:11    Patient is a 87y old  Female who presents with a chief complaint of Syncope (27 Dec 2022 11:11)      SUBJECTIVE / OVERNIGHT EVENTS:  Yesterday, dry-appearing and difficulty stick, started on D5LR. Failed speech and swallow eval, kept NPO.   Overnight, with 3.1 second pause, asymptomatic.   Pt seen and examined at bedside.    ROS negative except as above.    MEDICATIONS  (STANDING):  albuterol/ipratropium for Nebulization 3 milliLiter(s) Nebulizer every 6 hours  ascorbic acid 500 milliGRAM(s) Oral daily  aspirin enteric coated 81 milliGRAM(s) Oral daily  benzonatate 100 milliGRAM(s) Oral every 8 hours  ceFAZolin   IVPB      ceFAZolin   IVPB 1000 milliGRAM(s) IV Intermittent every 8 hours  dextrose 5% + lactated ringers. 1000 milliLiter(s) (75 mL/Hr) IV Continuous <Continuous>  guaiFENesin Oral Liquid (Sugar-Free) 100 milliGRAM(s) Oral every 6 hours  multivitamin 1 Tablet(s) Oral daily  polyethylene glycol 3350 17 Gram(s) Oral two times a day  rivaroxaban 15 milliGRAM(s) Oral with dinner    MEDICATIONS  (PRN):  bisacodyl 5 milliGRAM(s) Oral every 12 hours PRN Constipation      Vital Signs Last 24 Hrs  T(C): 36.6 (28 Dec 2022 04:30), Max: 36.6 (27 Dec 2022 13:35)  T(F): 97.8 (28 Dec 2022 04:30), Max: 97.9 (27 Dec 2022 16:49)  HR: 87 (28 Dec 2022 04:30) (57 - 87)  BP: 109/54 (28 Dec 2022 04:30) (92/55 - 139/85)  BP(mean): 98 (27 Dec 2022 18:59) (98 - 98)  RR: 18 (28 Dec 2022 04:30) (14 - 20)  SpO2: 99% (28 Dec 2022 04:30) (95% - 100%)    Parameters below as of 28 Dec 2022 04:30  Patient On (Oxygen Delivery Method): room air      CAPILLARY BLOOD GLUCOSE      POCT Blood Glucose.: 119 mg/dL (28 Dec 2022 06:41)  POCT Blood Glucose.: 131 mg/dL (28 Dec 2022 00:28)  POCT Blood Glucose.: 95 mg/dL (27 Dec 2022 17:25)    I&O's Summary      PHYSICAL EXAM:  GENERAL: NAD, well-developed  HEAD:  Atraumatic, Normocephalic  EYES: EOMI, PERRLA, conjunctiva and sclera clear  NECK: Supple, No JVD  CHEST/LUNG: Clear to auscultation bilaterally; No wheeze  HEART: Regular rate and rhythm; No murmurs, rubs, or gallops  ABDOMEN: Soft, Nontender, Nondistended; Bowel sounds present  EXTREMITIES:  2+ Peripheral Pulses, No clubbing, cyanosis, or edema  PSYCH: AAOx3  NEUROLOGY: non-focal  SKIN: No rashes or lesions    LABS:                        11.2   15.04 )-----------( 191      ( 27 Dec 2022 17:00 )             37.7     12-27    149<H>  |  108<H>  |  20  ----------------------------<  122<H>  5.1   |  31  |  0.70    Ca    8.4      27 Dec 2022 17:00  Phos  2.6     12-27  Mg     2.10     12-27    TPro  7.1  /  Alb  2.7<L>  /  TBili  0.4  /  DBili  x   /  AST  44<H>  /  ALT  32  /  AlkPhos  171<H>  12-27      CARDIAC MARKERS ( 27 Dec 2022 17:00 )  x     / x     / 34 U/L / x     / x              MICRO:      RADIOLOGY & ADDITIONAL TESTS:      Consultant(s) Notes Reviewed:         DATE OF SERVICE: 12-28-22 @ 07:11    Patient is a 87y old  Female who presents with a chief complaint of Syncope (27 Dec 2022 11:11)      SUBJECTIVE / OVERNIGHT EVENTS:  Yesterday, dry-appearing and difficulty stick, started on D5LR. Failed speech and swallow eval, kept NPO.   Overnight, with 3.1 second pause, asymptomatic.   Pt seen and examined at bedside. This morning lethargic and irritated, limited physical exam and interview as pt was refusing. Unable to use [place]  as patient could not hear the phone; daughter not currently at bedside to translate however she was called and she will stop by later today to discuss.     ROS could not be completed at this time.     MEDICATIONS  (STANDING):  albuterol/ipratropium for Nebulization 3 milliLiter(s) Nebulizer every 6 hours  ascorbic acid 500 milliGRAM(s) Oral daily  aspirin enteric coated 81 milliGRAM(s) Oral daily  benzonatate 100 milliGRAM(s) Oral every 8 hours  ceFAZolin   IVPB      ceFAZolin   IVPB 1000 milliGRAM(s) IV Intermittent every 8 hours  dextrose 5% + lactated ringers. 1000 milliLiter(s) (75 mL/Hr) IV Continuous <Continuous>  guaiFENesin Oral Liquid (Sugar-Free) 100 milliGRAM(s) Oral every 6 hours  multivitamin 1 Tablet(s) Oral daily  polyethylene glycol 3350 17 Gram(s) Oral two times a day  rivaroxaban 15 milliGRAM(s) Oral with dinner    MEDICATIONS  (PRN):  bisacodyl 5 milliGRAM(s) Oral every 12 hours PRN Constipation      Vital Signs Last 24 Hrs  T(C): 36.6 (28 Dec 2022 04:30), Max: 36.6 (27 Dec 2022 13:35)  T(F): 97.8 (28 Dec 2022 04:30), Max: 97.9 (27 Dec 2022 16:49)  HR: 87 (28 Dec 2022 04:30) (57 - 87)  BP: 109/54 (28 Dec 2022 04:30) (92/55 - 139/85)  BP(mean): 98 (27 Dec 2022 18:59) (98 - 98)  RR: 18 (28 Dec 2022 04:30) (14 - 20)  SpO2: 99% (28 Dec 2022 04:30) (95% - 100%)    Parameters below as of 28 Dec 2022 04:30  Patient On (Oxygen Delivery Method): room air      CAPILLARY BLOOD GLUCOSE      POCT Blood Glucose.: 119 mg/dL (28 Dec 2022 06:41)  POCT Blood Glucose.: 131 mg/dL (28 Dec 2022 00:28)  POCT Blood Glucose.: 95 mg/dL (27 Dec 2022 17:25)    I&O's Summary      PHYSICAL EXAM:  GENERAL: elderly, frail appearing woman, NAD  HEAD:  Atraumatic, Normocephalic  EYES: EOMI, PERRLA, conjunctiva and sclera clear  NECK: Supple, No JVD  CHEST/LUNG: no increased work of breathing  HEART: unable to assess given pt refusal   ABDOMEN: Soft, Nontender, Nondistended; Bowel sounds present  EXTREMITIES:  b/l redness of lower extremity, will demarcated above level of ankle, mild nonpitting edema b/l  PSYCH: AAOx3  NEUROLOGY: non-focal  SKIN: No rashes or lesions    LABS:                        11.2   15.04 )-----------( 191      ( 27 Dec 2022 17:00 )             37.7     12-27    149<H>  |  108<H>  |  20  ----------------------------<  122<H>  5.1   |  31  |  0.70    Ca    8.4      27 Dec 2022 17:00  Phos  2.6     12-27  Mg     2.10     12-27    TPro  7.1  /  Alb  2.7<L>  /  TBili  0.4  /  DBili  x   /  AST  44<H>  /  ALT  32  /  AlkPhos  171<H>  12-27      CARDIAC MARKERS ( 27 Dec 2022 17:00 )  x     / x     / 34 U/L / x     / x              MICRO:      RADIOLOGY & ADDITIONAL TESTS:      Consultant(s) Notes Reviewed:

## 2022-12-28 NOTE — PROGRESS NOTE ADULT - PROBLEM SELECTOR PLAN 9
DVT: Xarelto  Diet: NPO as failed dysphagia, S&S. Advance as tolerated  Dispo: Pending clinical improvement
DVT: Xarelto  Diet: NPO as failed dysphagia, S&S. Advance as tolerated  Dispo: Pending clinical improvement

## 2022-12-28 NOTE — PROGRESS NOTE ADULT - PROBLEM SELECTOR PLAN 3
Patient denies pain to her R foot and denies numbness and tingling. As per daughter patients foot normally is blue and changes color depending on patients leg position.  - CTA A/P with IV contrast with aortic runoff to b/l feet Initially concerned for arterial occlusion in R foot given blue appearance and concern for cellulitis in L foot given erythema and swelling. On re-evaluation, both feet appear symmetrically erythematous, with well demarcated line above level of malleolus. On further interview with daughter, pt's feet change colors for years now, and is related to position. Redness improved with elevation of the leg. All this is suggestive of a more chronic disease process, likely chronic venous insufficiency +/- Raynaud phenomenon     - initial concern for arterial occlusion of R foot given cold blue foot. CTA with aortic runoff to b/l feet with no arterial occlusion in LE. On reassessment, foot no longer blue, now red   - initial concern for cellulitis of L foot however now less likely given above reasoning, will dc cefazolin and monitor off abx, if clinically worsening, febrile, or uptrending leukocytosis, will consider restarting abx

## 2022-12-28 NOTE — CONSULT NOTE ADULT - ASSESSMENT
87y old  Females/p Syncope  past medical history of DEE mabry (on aspirin and Xarelto) on Sotalol, Hyperthyroidism, HLD, cholecystectomy, hemorrhoids, colitis, recent admission for bloody bowel movement and sepsis secondary to influenza,  She regained consciousness 30-45sec   Denies post syncopal confusion.  Also blue and cold R foot which the daughter said happens in [past]  nutrition 35cal/kg, protien 1.2-1.5g/kg, D  management  offload  moisture barrier- signs of previous candida resolved    kerlex,  socks/  check venous doppler h/o afib and syncope  < from: CT Angio Abd Aorta w/run-off w/ IV Cont ( Mild atherosclerotic calcifications ofthe extremities without evidence   of occlusion.A new mass-like opacity in the right lower lobe since 12/9/2022, likely   pneumonia, in the right clinical setting. wup as per IM  santyl enzymatic debridement alginate foam or stacral pu stage 4  feet monitor ? aib with cyanosis from embolis? no obstruction on ac/ asa  hct 30  DVT prophylaxisis  established/new problem improved, stable  no cellultis no sharp debridement att his time   continue syncope wup as per med  data reviewed lab, tests, records, image  complexity high    risk high -  due to dx, complexity data, risk  time 70 min 223

## 2022-12-28 NOTE — PROGRESS NOTE ADULT - PROBLEM SELECTOR PLAN 5
Pt with hx of hyperthyroidism. On methimazole  - hold methimazole  - endo consult Pt with dry cough i/s/o recent flu infection likely post viral bronchitis  - tessalon perles

## 2022-12-28 NOTE — SWALLOW BEDSIDE ASSESSMENT ADULT - SWALLOW EVAL: DIAGNOSIS
1- Functional oral stage for puree, minced and moist solids, moderately thick liquids, mildly thick liquids, and thin liquids marked by adequate oral containment, timely mastication with adequate manipulation and adequate anterior to posterior transport with oral clearance noted. 2- Mild to Moderate oral stage for soft and bite sized solids marked by adequate oral containment, prolonged mastication and prolonged manipulation with mild oral residue noted requiring liquid washx2. 3- Functional pharyngeal stage for puree, minced and moist solids, soft and bite sized solids, moderately thick liquids, mildly thick liquids, and thin liquids marked by initiation of pharyngeal swallow trigger and hyolaryngeal excursion upon palpation. No overt signs or symptoms of aspiration noted across PO consistencies.

## 2022-12-28 NOTE — PROGRESS NOTE ADULT - ASSESSMENT
This is a 87-year-old woman with a pmhx A. fib  (Xarelto and Sotalol), cholecystectomy, hemorrhoids, colitis, recent admission for bloody bowel movement and sepsis secondary to influenza who presented with a syncopal episode. Per daughter, patient was being fed and then had brief syncopal episode before regaining consciousness. Patient brought to the ED for eval. In the ED, noted to have slow Afib on monitor with episodes of pauses occurring on telemetry review. Labs significant for hyperkalemia and elevated TSH and low T3 and FT4.    Assessment and Plan:  - Continuous telemetric monitoring  - Monitor electrolytes and replete K to 4 and Mg to 2  - Continue rivaroxaban for thromboembolic ppx  given that patient has a [id number] score of 3.   - Discontinue home sotalol due to concern about pause and bradycardia   - Continue care per primary team    Bebe Landry PA-C  Patient to be staff with attending. Please await attending addendum

## 2022-12-29 NOTE — PHYSICAL THERAPY INITIAL EVALUATION ADULT - NSPTDISCHREC_GEN_A_CORE
Anticipated discharge to Rehab facility, please follow PT notes for discharge recommendations as pt is currently lethargic and unable to fully follow commands

## 2022-12-29 NOTE — DIETITIAN INITIAL EVALUATION ADULT - OTHER INFO
A&Ox1. Daughter provided collateral. 12/28 swallow assessment- recommending minced and moist with thin liquids. Pt ate <50% of breakfast today. Daughter amenable to Ensure supplements. +constipation- no BM since Saturday (5 days). On bowel regimen. No nausea/vomiting. NKFA. Reported  pounds.

## 2022-12-29 NOTE — DIETITIAN INITIAL EVALUATION ADULT - ORAL INTAKE PTA/DIET HISTORY
per daughter, she was feeding patient pureed foods and shredded meats. Pt has a good appetite, but slow eater. Daughter admits to recent decrease in PO intake 2/2 lethargy.  The patient is a 8y4m Male complaining of abdominal pain.

## 2022-12-29 NOTE — PHYSICAL THERAPY INITIAL EVALUATION ADULT - ADDITIONAL COMMENTS
Pt is lethargic and unable to provide prior level of function. History obtained from care coordination notes.   According to care coordination notes, pt lives in a house with her daughter who is the primary caregiver and has a home health aide. Pt has a hospital bed, walker, and wheel chair.  Anticipated discharge to rehab facility, please follow PT notes for trial sessions to determine discharge recommendation as pt is currently lethargic and unable to fully follow commands.

## 2022-12-29 NOTE — PROGRESS NOTE ADULT - PROBLEM SELECTOR PLAN 3
Initially concerned for arterial occlusion in R foot given blue appearance and concern for cellulitis in L foot given erythema and swelling. On re-evaluation, both feet appear symmetrically erythematous, with well demarcated line above level of malleolus. On further interview with daughter, pt's feet change colors for years now, and is related to position. Redness improved with elevation of the leg. All this is suggestive of a more chronic disease process, likely chronic venous insufficiency +/- Raynaud phenomenon     - initial concern for arterial occlusion of R foot given cold blue foot. CTA with aortic runoff to b/l feet with no arterial occlusion in LE. On reassessment, foot no longer blue, now red   - initial concern for cellulitis of L foot however now less likely given above reasoning, will dc cefazolin and monitor off abx, if clinically worsening, febrile, or uptrending leukocytosis, will consider restarting abx

## 2022-12-29 NOTE — PROGRESS NOTE ADULT - PROBLEM SELECTOR PLAN 8
DVT: Xarelto  Diet: NPO as failed dysphagia, S&S. Advance as tolerated  Dispo: Pending clinical improvement

## 2022-12-29 NOTE — PROGRESS NOTE ADULT - SUBJECTIVE AND OBJECTIVE BOX
Daughter at the bedside translating for patient  Subjective:  Patient denied feeling like she is going to pass out, lightheadedness, dizziness, SOB, and CP    Interval events:  - p/w after a syncopal event when eating applesauce, Daughter notes that her eyes rolled back and she became pale.  She regained consciousness 30-45sec while daughter was on phone with EMS. In the ED patient was in Afib HR 30-60s with a 3.7 seconds pause.  - Patient was started on antibiotics for possible cellulitis of the LLE.   - Ep consulted for syncope. Labs significant for hyperkalemia, elevated TSH and WBC, and low T3 and FT4. Primary team holding methimazole.  - Vascular consulted for cyanotic LE. Per Vascular note, patient has a weak but palpable PT and DP pulse. They recommend CT angio abdomen and pelvis with IV contrast with aortic runoff to B/l feet.  - In the past 24 hours: Afib HR mostly rate control, while sleeping HR goes into the 40s lowest was 39 in the past 24 hour and HR went up to the 130 this morning when she was getting clean but immediately went back to the 80 after RN was done. Patient had 3 pauses in the past 24 hour the longest was 2.62 seconds    MEDICATIONS  (STANDING):  albuterol/ipratropium for Nebulization 3 milliLiter(s) Nebulizer every 6 hours  ascorbic acid 500 milliGRAM(s) Oral daily  aspirin enteric coated 81 milliGRAM(s) Oral daily  benzonatate 100 milliGRAM(s) Oral every 8 hours  bisacodyl Suppository 10 milliGRAM(s) Rectal once  collagenase Ointment 1 Application(s) Topical daily  dextrose 5% + lactated ringers. 1000 milliLiter(s) (75 mL/Hr) IV Continuous <Continuous>  guaiFENesin Oral Liquid (Sugar-Free) 100 milliGRAM(s) Oral every 6 hours  multivitamin 1 Tablet(s) Oral daily  polyethylene glycol 3350 17 Gram(s) Oral two times a day  potassium phosphate / sodium phosphate Powder (PHOS-NaK) 2 Packet(s) Oral two times a day  rivaroxaban 15 milliGRAM(s) Oral with dinner  senna 2 Tablet(s) Oral at bedtime    MEDICATIONS  (PRN):      Vital Signs Last 24 Hrs  T(C): 36.3 (29 Dec 2022 05:23), Max: 36.4 (28 Dec 2022 16:48)  T(F): 97.4 (29 Dec 2022 05:23), Max: 97.6 (28 Dec 2022 16:48)  HR: 64 (29 Dec 2022 05:23) (58 - 87)  BP: 109/71 (29 Dec 2022 05:23) (106/75 - 109/71)  BP(mean): --  RR: 16 (29 Dec 2022 05:23) (16 - 18)  SpO2: 100% (29 Dec 2022 05:23) (100% - 100%)    Parameters below as of 29 Dec 2022 09:13  Patient On (Oxygen Delivery Method): room air      I&O's Detail    28 Dec 2022 07:01  -  29 Dec 2022 07:00  --------------------------------------------------------  IN:    dextrose 5% + lactated ringers: 748.8 mL    IV PiggyBack: 500 mL    Oral Fluid: 820 mL  Total IN: 2068.8 mL    OUT:    Voided (mL): 1000 mL  Total OUT: 1000 mL    Total NET: 1068.8 mL          Physical Exam:  GENERAL: Elder woman Lying in bed, NAD, speaking to her daughter in [place]    HEART: S1S2 irregularly irregular   PULMONARY: CTABL, normal respiratory effort.  ABDOMEN: + Bowel sounds present, soft  EXTREMITIES:  Warm, well -perfused, no pedal edema, distal pulses present    TELEMETERIC: Afib HR mostly rate control, while sleeping HR goes into the 40s lowest was 39 in the past 24 hour and HR went up to the 130 this morning when she was getting clean but immediately went back to the 80 after RN was done. Patient had 3 pauses in the past 24 hour the longest was 2.62 seconds                          9.2    8.57  )-----------( 125      ( 29 Dec 2022 06:03 )             30.4       12-29    151<H>  |  114<H>  |  10  ----------------------------<  82  4.1   |  30  |  0.60    Ca    7.5<L>      29 Dec 2022 06:03  Phos  2.0     12-29  Mg     1.90     12-29    TPro  5.9<L>  /  Alb  1.9<L>  /  TBili  0.3  /  DBili  x   /  AST  30  /  ALT  12  /  AlkPhos  142<H>  12-29    CARDIAC MARKERS ( 27 Dec 2022 17:00 )  x     / x     / 34 U/L / x     / x

## 2022-12-29 NOTE — DIETITIAN INITIAL EVALUATION ADULT - REASON FOR ADMISSION
Syncope and collapse  87-year-old female past medical history of A. fib (on aspirin and Xarelto) on Sotalol, cholecystectomy, hemorrhoids, colitis, recent admission for bloody bowel movement and sepsis secondary to influenza, presents with syncopal episode i/s/o situational syncope vs cardiac syncope. Tele with slow afib with frequent pauses. Admitted to medicine for further management

## 2022-12-29 NOTE — PROGRESS NOTE ADULT - PROBLEM SELECTOR PLAN 4
Pt with hx of hyperthyroidism. On methimazole  - hold methimazole  - curbside endo agreeing with holding methimazole, will do full formal consult when pt is closer to discharge to recheck thyroid function and make reccs from there

## 2022-12-29 NOTE — PROGRESS NOTE ADULT - SUBJECTIVE AND OBJECTIVE BOX
DATE OF SERVICE: 12-29-22 @ 07:05    Patient is a 87y old  Female who presents with a chief complaint of Syncope (28 Dec 2022 19:33)      SUBJECTIVE / OVERNIGHT EVENTS:  Overnight, no events.   Pt seen and examined at bedside.    ROS negative except as above.    MEDICATIONS  (STANDING):  albuterol/ipratropium for Nebulization 3 milliLiter(s) Nebulizer every 6 hours  ascorbic acid 500 milliGRAM(s) Oral daily  aspirin enteric coated 81 milliGRAM(s) Oral daily  benzonatate 100 milliGRAM(s) Oral every 8 hours  collagenase Ointment 1 Application(s) Topical daily  guaiFENesin Oral Liquid (Sugar-Free) 100 milliGRAM(s) Oral every 6 hours  multivitamin 1 Tablet(s) Oral daily  polyethylene glycol 3350 17 Gram(s) Oral two times a day  rivaroxaban 15 milliGRAM(s) Oral with dinner    MEDICATIONS  (PRN):  bisacodyl 5 milliGRAM(s) Oral every 12 hours PRN Constipation      Vital Signs Last 24 Hrs  T(C): 36.3 (29 Dec 2022 05:23), Max: 36.4 (28 Dec 2022 16:48)  T(F): 97.4 (29 Dec 2022 05:23), Max: 97.6 (28 Dec 2022 16:48)  HR: 64 (29 Dec 2022 05:23) (58 - 87)  BP: 109/71 (29 Dec 2022 05:23) (102/75 - 132/80)  BP(mean): --  RR: 16 (29 Dec 2022 05:23) (16 - 18)  SpO2: 100% (29 Dec 2022 05:23) (100% - 100%)    Parameters below as of 29 Dec 2022 05:23  Patient On (Oxygen Delivery Method): room air      CAPILLARY BLOOD GLUCOSE      POCT Blood Glucose.: 141 mg/dL (28 Dec 2022 17:09)  POCT Blood Glucose.: 128 mg/dL (28 Dec 2022 12:00)    I&O's Summary    28 Dec 2022 07:01  -  29 Dec 2022 07:00  --------------------------------------------------------  IN: 2068.8 mL / OUT: 1000 mL / NET: 1068.8 mL        PHYSICAL EXAM:  GENERAL: NAD, well-developed  HEAD:  Atraumatic, Normocephalic  EYES: EOMI, PERRLA, conjunctiva and sclera clear  NECK: Supple, No JVD  CHEST/LUNG: Clear to auscultation bilaterally; No wheeze  HEART: Regular rate and rhythm; No murmurs, rubs, or gallops  ABDOMEN: Soft, Nontender, Nondistended; Bowel sounds present  EXTREMITIES:  2+ Peripheral Pulses, No clubbing, cyanosis, or edema  PSYCH: AAOx3  NEUROLOGY: non-focal  SKIN: No rashes or lesions    LABS:                        9.2    8.57  )-----------( 125      ( 29 Dec 2022 06:03 )             30.4     12-29    151<H>  |  114<H>  |  10  ----------------------------<  82  4.1   |  30  |  0.60    Ca    7.5<L>      29 Dec 2022 06:03  Phos  2.0     12-29  Mg     1.90     12-29    TPro  5.9<L>  /  Alb  1.9<L>  /  TBili  0.3  /  DBili  x   /  AST  30  /  ALT  12  /  AlkPhos  142<H>  12-29      CARDIAC MARKERS ( 27 Dec 2022 17:00 )  x     / x     / 34 U/L / x     / x              MICRO:      RADIOLOGY & ADDITIONAL TESTS:      Consultant(s) Notes Reviewed:         DATE OF SERVICE: 12-29-22 @ 07:05    Patient is a 87y old  Female who presents with a chief complaint of Syncope (28 Dec 2022 19:33)      SUBJECTIVE / OVERNIGHT EVENTS:  Overnight, frequent sinus pauses, longest was 2.62 seconds. Adrian to as low as 38, tachy to as high as 133, non sustained, average HR around 50s-70s.   Pt seen and examined at bedside. Very sleepy this morning. Unable to participate in interview as pt cannot hear Faroese  over the phone.    ROS could not be completed at this time.     MEDICATIONS  (STANDING):  albuterol/ipratropium for Nebulization 3 milliLiter(s) Nebulizer every 6 hours  ascorbic acid 500 milliGRAM(s) Oral daily  aspirin enteric coated 81 milliGRAM(s) Oral daily  benzonatate 100 milliGRAM(s) Oral every 8 hours  collagenase Ointment 1 Application(s) Topical daily  guaiFENesin Oral Liquid (Sugar-Free) 100 milliGRAM(s) Oral every 6 hours  multivitamin 1 Tablet(s) Oral daily  polyethylene glycol 3350 17 Gram(s) Oral two times a day  rivaroxaban 15 milliGRAM(s) Oral with dinner    MEDICATIONS  (PRN):  bisacodyl 5 milliGRAM(s) Oral every 12 hours PRN Constipation      Vital Signs Last 24 Hrs  T(C): 36.3 (29 Dec 2022 05:23), Max: 36.4 (28 Dec 2022 16:48)  T(F): 97.4 (29 Dec 2022 05:23), Max: 97.6 (28 Dec 2022 16:48)  HR: 64 (29 Dec 2022 05:23) (58 - 87)  BP: 109/71 (29 Dec 2022 05:23) (102/75 - 132/80)  BP(mean): --  RR: 16 (29 Dec 2022 05:23) (16 - 18)  SpO2: 100% (29 Dec 2022 05:23) (100% - 100%)    Parameters below as of 29 Dec 2022 05:23  Patient On (Oxygen Delivery Method): room air      CAPILLARY BLOOD GLUCOSE      POCT Blood Glucose.: 141 mg/dL (28 Dec 2022 17:09)  POCT Blood Glucose.: 128 mg/dL (28 Dec 2022 12:00)    I&O's Summary    28 Dec 2022 07:01  -  29 Dec 2022 07:00  --------------------------------------------------------  IN: 2068.8 mL / OUT: 1000 mL / NET: 1068.8 mL        PHYSICAL EXAM:  GENERAL: frail, elderly appearing woman, NAD  HEAD:  Atraumatic, Normocephalic  EYES: EOMI, PERRLA, conjunctiva and sclera clear  NECK: Supple, No JVD  CHEST/LUNG: Clear to auscultation bilaterally; No wheeze  HEART: irregularly irregular ; No murmurs, rubs, or gallops  ABDOMEN: Soft, mildly tender to palpation  EXTREMITIES:  b/l symmetric erythema, mild pitting edema  PSYCH: AAOx3  NEUROLOGY: non-focal  SKIN: No rashes or lesions    LABS:                        9.2    8.57  )-----------( 125      ( 29 Dec 2022 06:03 )             30.4     12-29    151<H>  |  114<H>  |  10  ----------------------------<  82  4.1   |  30  |  0.60    Ca    7.5<L>      29 Dec 2022 06:03  Phos  2.0     12-29  Mg     1.90     12-29    TPro  5.9<L>  /  Alb  1.9<L>  /  TBili  0.3  /  DBili  x   /  AST  30  /  ALT  12  /  AlkPhos  142<H>  12-29      CARDIAC MARKERS ( 27 Dec 2022 17:00 )  x     / x     / 34 U/L / x     / x              MICRO:      RADIOLOGY & ADDITIONAL TESTS:      Consultant(s) Notes Reviewed:

## 2022-12-29 NOTE — PHYSICAL THERAPY INITIAL EVALUATION ADULT - BED MOBILITY LIMITATIONS, REHAB EVAL
Unable to assess as pt is lethargic and unable to fully follow commands at this time. PT will continue to follow up.

## 2022-12-29 NOTE — DIETITIAN INITIAL EVALUATION ADULT - PERTINENT LABORATORY DATA
12-29    151<H>  |  114<H>  |  10  ----------------------------<  82  4.1   |  30  |  0.60    Ca    7.5<L>      29 Dec 2022 06:03  Phos  2.0     12-29  Mg     1.90     12-29    TPro  5.9<L>  /  Alb  1.9<L>  /  TBili  0.3  /  DBili  x   /  AST  30  /  ALT  12  /  AlkPhos  142<H>  12-29  POCT Blood Glucose.: 141 mg/dL (12-28-22 @ 17:09)  A1C with Estimated Average Glucose Result: 5.4 % (12-09-22 @ 00:50)

## 2022-12-29 NOTE — PHYSICAL THERAPY INITIAL EVALUATION ADULT - TRANSFER SAFETY CONCERNS NOTED: SIT/STAND, REHAB EVAL
Unable to assess as pt is lethargic and unable to fully follow commands at this time. PT will continue to follow up

## 2022-12-29 NOTE — PROGRESS NOTE ADULT - PROBLEM SELECTOR PLAN 1
Pt presenting with syncopal episode i/s/o earing suggestive of situational syncope/vasovagal i/s/o swallowing. Tele with slow Afib with episodes of pauses (longest pause 3.1 seconds though asymptomatic, stable BP) raising concern for cardiac syncope. Hypothyroidism and metabolic causes also on differential  - EP following, appreciate recs  - Monitor on Tele  - Hold sotalol  - Pt with elevated TSH (6.17) and low T4 (4.3) i/s/o methimazole use; hold methimazole  - replete lytes as needed  - no further EP intervention at this time Pt presenting with syncopal episode i/s/o earing suggestive of situational syncope/vasovagal i/s/o swallowing. Tele with slow Afib with frequent episodes of pauses (longest pause 3.1 seconds though asymptomatic, stable BP) raising concern for cardiac syncope. Hypothyroidism and metabolic causes also on differential  TTE with severe TR, MR. Severely dilated LA. Normal LV systolic function. Reduced RV systolic function. Severe pulmonary HTN.  - EP following, appreciate recs  - Monitor on Tele  - Hold sotalol  - Pt with elevated TSH (6.17) and low T4 (4.3) i/s/o methimazole use; hold methimazole  - replete lytes as needed

## 2022-12-29 NOTE — DIETITIAN INITIAL EVALUATION ADULT - ADD RECOMMEND
1. Encourage PO intake and honor food preferences as able.   2. Continue to document PO in RN flow sheets and monitor weekly weights.   3. Continue bowel regimen.   4. Continue multivitamin and Vitamin C supplementation for wound healing.

## 2022-12-29 NOTE — DIETITIAN INITIAL EVALUATION ADULT - PERTINENT MEDS FT
MEDICATIONS  (STANDING):  albuterol/ipratropium for Nebulization 3 milliLiter(s) Nebulizer every 6 hours  ascorbic acid 500 milliGRAM(s) Oral daily  aspirin enteric coated 81 milliGRAM(s) Oral daily  benzonatate 100 milliGRAM(s) Oral every 8 hours  bisacodyl Suppository 10 milliGRAM(s) Rectal once  collagenase Ointment 1 Application(s) Topical daily  dextrose 5% + lactated ringers. 1000 milliLiter(s) (75 mL/Hr) IV Continuous <Continuous>  guaiFENesin Oral Liquid (Sugar-Free) 100 milliGRAM(s) Oral every 6 hours  magnesium sulfate  IVPB 2 Gram(s) IV Intermittent once  multivitamin 1 Tablet(s) Oral daily  polyethylene glycol 3350 17 Gram(s) Oral two times a day  potassium phosphate / sodium phosphate Powder (PHOS-NaK) 2 Packet(s) Oral two times a day  rivaroxaban 15 milliGRAM(s) Oral with dinner  senna 2 Tablet(s) Oral at bedtime    MEDICATIONS  (PRN):

## 2022-12-29 NOTE — PROGRESS NOTE ADULT - ATTENDING COMMENTS
Seen and examined by me this afternoon, daughter at bedside, comfortable, no complaints per daughter, HR around 80's but has continued to have episodes of bradycardia alternating with some episodes of tachycardia  EP follow up appreciated, discontinue home sotalol due to concern about pause and bradycardia  TTE shows normal EF but shows-severe MR, mild AS, mild-mod AI, severely dilated LA, severe TR, severe pulmonary HTN  Syncope, likely due to pauses episodes (sotalol related) and TTE findings likely also contributing to syncope-severe MR and severe TR  Will get Cards to see patient for further recs  Patient remains on A.Fib-permanent atrial fibrillation, c/w xarelto  Elevated TSH-holding methimazole at this time, FT4 is on the low side, Endocrine to see pt at some point  Hyponatremia of 151-giving some D5, f/up BMP  Transaminitis trending down (AST/Alk-phos)  PT evaluation --> DEISY  Discussed with daughter at bedside  Rest as above Seen and examined by me this afternoon, daughter at bedside, comfortable, no complaints per daughter, HR around 80's but has continued to have episodes of bradycardia alternating with some episodes of tachycardia  EP follow up appreciated, discontinue home sotalol due to concern about pause and bradycardia  TTE shows normal EF but shows-severe MR, mild AS, mild-mod AI, severely dilated LA, severe TR, severe pulmonary HTN  Syncope, likely due to pauses episodes (sotalol related) and TTE findings likely also contributing to syncope-severe MR and severe TR  Will get Cards to see patient for further recs  Patient remains on A.Fib-permanent atrial fibrillation, c/w xarelto  Elevated TSH-holding methimazole at this time, FT4 is on the low side, Endocrine to see pt at some point  Hypernatremia of 151-giving some D5, f/up BMP  Transaminitis trending down (AST/Alk-phos)  PT evaluation --> DEISY  Discussed with daughter at bedside  Rest as above

## 2022-12-29 NOTE — DIETITIAN INITIAL EVALUATION ADULT - PERSON TAUGHT/METHOD
strategies to optimize protein-energy intake.   informed of increased needs for wound healing.   encouraged intake of ensure supplements./verbal instruction/daughter instructed

## 2022-12-29 NOTE — PROGRESS NOTE ADULT - ASSESSMENT
This is a 87-year-old woman with a pmhx A. fib  (Xarelto and Sotalol), cholecystectomy, hemorrhoids, colitis, recent admission for bloody bowel movement and sepsis secondary to influenza who presented with a syncopal episode. Per daughter, patient was being fed and then had brief syncopal episode before regaining consciousness. Patient brought to the ED for eval. In the ED, noted to have slow Afib on monitor with episodes of pauses occurring on telemetry review. Labs significant for hyperkalemia and elevated TSH and low T3 and FT4.    Assessment and Plan:  - Continuous telemetric monitoring  - Monitor electrolytes and replete K to 4 and Mg to 2  - Continue rivaroxaban for thromboembolic ppx  given that patient has a [id number] score of 3.   - Discontinue home sotalol due to concern about pause and bradycardia   - Continue care per primary team    Bebe Landry PA-C  Patient to be staff with attending. Please await attending addendum

## 2022-12-30 NOTE — PROGRESS NOTE ADULT - PROBLEM SELECTOR PLAN 8
DVT: Xarelto  Diet: NPO as failed dysphagia, S&S. Advance as tolerated  Dispo: Pending clinical improvement DVT: Xarelto  Diet: minced and moist  Dispo: Pending clinical improvement

## 2022-12-30 NOTE — PROGRESS NOTE ADULT - ASSESSMENT
This is a 87-year-old woman with a pmhx A. fib  (Xarelto and Sotalol), cholecystectomy, hemorrhoids, colitis, recent admission for bloody bowel movement and sepsis secondary to influenza who presented with a syncopal episode. Per daughter, patient was being fed and then had brief syncopal episode before regaining consciousness. Patient brought to the ED for eval. In the ED, noted to have slow Afib on monitor with episodes of pauses occurring on telemetry review. Labs significant for hyperkalemia and elevated TSH and low T3 and FT4.    Assessment and Plan:  - Continuous telemetric monitoring-- overnight Afib at 60s-70s with longest pause 2.35s  - Monitor electrolytes and replete K to 4 and Mg to 2  - Continue rivaroxaban for thromboembolic ppx  given that patient has a [id number] score of 3.   - Discontinue home sotalol due to concern about pause and bradycardia   - Continue care per primary team

## 2022-12-30 NOTE — PROGRESS NOTE ADULT - ATTENDING COMMENTS
Seen and examined by me this afternoon, daughter at bedside, comfortable, no complaints per daughter, more awake today and eating more  HR continues to fluctuate on telemetry, continues to have some pauses, asymptomatic  EP follow up appreciated, holding home sotalol due to concern about pause and bradycardia (will likely not be resumed upon discharge)  TTE shows normal EF but shows-severe MR, mild AS, mild-mod AI, severely dilated LA, severe TR, severe pulmonary HTN  Syncope, likely due to pauses episodes (sotalol related) and TTE findings likely also contributing to syncope-severe MR and severe TR  Team discussed with patient's cards (Dr. Ramires) who states that pt's valvular disease is already known and suggests for patient to get a PPM placed on this admission, will have EP comment on this  Patient remains on A.Fib-permanent atrial fibrillation, c/w xarelto  Elevated TSH/Free T4 on low side of normal, apprec Endocrine recs (verbal recs), methimazole dose decreased to 2.5mg QD  Hyponatremia improving on D5, f/up BMP  Transaminitis trending down (Alk-phos)  PT evaluation --> DEISY (daughter Laura is in agreement with it)  Discussed with daughter at bedside at length  Rest as above Seen and examined by me this afternoon, daughter at bedside, comfortable, no complaints per daughter, more awake today and eating more  HR continues to fluctuate on telemetry, continues to have some pauses, asymptomatic  EP follow up appreciated, holding home sotalol due to concern about pause and bradycardia (will likely not be resumed upon discharge)  TTE shows normal EF but shows-severe MR, mild AS, mild-mod AI, severely dilated LA, severe TR, severe pulmonary HTN  Syncope, likely due to pauses episodes (sotalol related) and TTE findings likely also contributing to syncope-severe MR and severe TR  Team discussed with patient's cards (Dr. Ramires) who states that pt's valvular disease is already known and suggests for patient to get a PPM placed on this admission, will have EP comment on this  Patient remains on A.Fib-permanent atrial fibrillation, c/w xarelto  Elevated TSH/Free T4 on low side of normal, apprec Endocrine recs (verbal recs), methimazole dose decreased to 2.5mg QD  Hypernatremia improving on D5, f/up BMP  Transaminitis trending down (Alk-phos)  PT evaluation --> DEISY (daughter Laura is in agreement with it)  Discussed with daughter at bedside at length  Rest as above

## 2022-12-30 NOTE — PROGRESS NOTE ADULT - PROBLEM SELECTOR PLAN 1
Pt presenting with syncopal episode i/s/o earing suggestive of situational syncope/vasovagal i/s/o swallowing. Tele with slow Afib with frequent episodes of pauses (longest pause 3.1 seconds though asymptomatic, stable BP) raising concern for cardiac syncope. Hypothyroidism and metabolic causes also on differential  TTE with severe TR, MR. Severely dilated LA. Normal LV systolic function. Reduced RV systolic function. Severe pulmonary HTN.  - EP following, appreciate recs  - Monitor on Tele  - Hold sotalol  - Pt with elevated TSH (6.17) and low T4 (4.3) i/s/o methimazole use; hold methimazole  - replete lytes as needed Pt presenting with syncopal episode i/s/o earing suggestive of situational syncope/vasovagal i/s/o swallowing. Tele with slow Afib with frequent episodes of pauses (longest pause 3.1 seconds though asymptomatic, stable BP) raising concern for cardiac syncope. Hypothyroidism and metabolic causes also on differential  TTE with severe TR, MR. Severely dilated LA. Normal LV systolic function. Reduced RV systolic function. Severe pulmonary HTN.  - EP following, appreciate recs  - Monitor on Tele  - Hold sotalol  - replete lytes as needed Pt presenting with syncopal episode i/s/o earing suggestive of situational syncope/vasovagal i/s/o swallowing. Tele with slow Afib with frequent episodes of pauses (longest pause 3.1 seconds though asymptomatic, stable BP) raising concern for cardiac syncope. Hypothyroidism and metabolic causes also on differential  TTE with severe TR, MR. Severely dilated LA. Normal LV systolic function. Reduced RV systolic function. Severe pulmonary HTN.  - EP following, appreciate recs  - Monitor on Tele  - Hold sotalol  - replete lytes as needed  - given TTE findings, will reach out to outpatient cardiologist Dr. Sami Ramires to see if any interventions are indicated (unlikely given her age and current functional status) Pt presenting with syncopal episode i/s/o earing suggestive of situational syncope/vasovagal i/s/o swallowing. Tele with slow Afib with frequent episodes of pauses (longest pause 3.1 seconds though asymptomatic, stable BP) raising concern for cardiac syncope. Hypothyroidism and metabolic causes also on differential  TTE with severe TR, MR. Severely dilated LA. Normal LV systolic function. Reduced RV systolic function. Severe pulmonary HTN.  - EP following, appreciate recs  - Monitor on Tele  - Hold sotalol  - replete lytes as needed  - given TTE findings, will reach out to outpatient cardiologist Dr. Sami Ramires to see if any interventions are indicated  - spoke with Dr. Ramires 12/30, he said pt may be a candidate for pacemaker given frequent sinus pauses if concerned for sick sinus syndrome, will discuss with EP if this is a possibility

## 2022-12-30 NOTE — PROGRESS NOTE ADULT - PROBLEM SELECTOR PLAN 4
Pt with hx of hyperthyroidism. On methimazole  - hold methimazole  - curbside endo agreeing with holding methimazole, will do full formal consult when pt is closer to discharge to recheck thyroid function and make reccs from there Pt with hx of hyperthyroidism. 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

## 2022-12-30 NOTE — PROGRESS NOTE ADULT - SUBJECTIVE AND OBJECTIVE BOX
Interval history:  No acute overnight event  Tele reveals Afib at 60s-70s with pauses (longest one last 2.35s)          PAST MEDICAL & SURGICAL HISTORY:  Atrial fibrillation    UTI (urinary tract infection)    Hypothyroidism    Colon cancer    Hemorrhoids    HLD (hyperlipidemia)    History of cholecystectomy    Perforated appendicitis    History of cataract surgery        MEDICATIONS  (STANDING):  albuterol/ipratropium for Nebulization 3 milliLiter(s) Nebulizer every 6 hours  artificial tears (preservative free) Ophthalmic Solution 1 Drop(s) Both EYES two times a day  ascorbic acid 500 milliGRAM(s) Oral daily  aspirin enteric coated 81 milliGRAM(s) Oral daily  benzonatate 100 milliGRAM(s) Oral every 8 hours  collagenase Ointment 1 Application(s) Topical daily  dextrose 5% + lactated ringers. 1000 milliLiter(s) (75 mL/Hr) IV Continuous <Continuous>  methimazole 2.5 milliGRAM(s) Oral daily  multivitamin 1 Tablet(s) Oral daily  polyethylene glycol 3350 17 Gram(s) Oral two times a day  rivaroxaban 15 milliGRAM(s) Oral with dinner  senna 2 Tablet(s) Oral at bedtime    MEDICATIONS  (PRN):            Vital Signs Last 24 Hrs  T(C): 36.4 (30 Dec 2022 05:30), Max: 36.6 (29 Dec 2022 21:00)  T(F): 97.6 (30 Dec 2022 05:30), Max: 97.8 (29 Dec 2022 21:00)  HR: 74 (30 Dec 2022 05:30) (70 - 84)  BP: 118/76 (30 Dec 2022 05:30) (115/72 - 124/67)  BP(mean): --  RR: 17 (30 Dec 2022 05:30) (16 - 17)  SpO2: 100% (30 Dec 2022 05:30) (100% - 100%)    Parameters below as of 30 Dec 2022 07:38  Patient On (Oxygen Delivery Method): room air                INTERPRETATION OF TELEMETRY:  Afib at 60s-70s with pauses (longest one last 2.35s)    ECG:        LABS:                        9.2    9.59  )-----------( 120      ( 30 Dec 2022 06:05 )             30.3     12-30    148<H>  |  113<H>  |  7   ----------------------------<  100<H>  4.1   |  30  |  0.47<L>    Ca    7.2<L>      30 Dec 2022 06:05  Phos  1.7     12-30  Mg     2.10     12-30    TPro  5.9<L>  /  Alb  1.9<L>  /  TBili  0.3  /  DBili  x   /  AST  30  /  ALT  12  /  AlkPhos  142<H>  12-29            I&O's Summary    29 Dec 2022 07:01  -  30 Dec 2022 07:00  --------------------------------------------------------  IN: 100 mL / OUT: 1050 mL / NET: -950 mL      BNP  RADIOLOGY & ADDITIONAL STUDIES:      PHYSICAL EXAM:    GENERAL: In no apparent distress, well nourished, and hydrated.  HEART: Irregular rate and rhythm; No murmurs, rubs, or gallops.  PULMONARY: Clear to auscultation and percussion.  No rales, wheezing, or rhonchi bilaterally.  ABDOMEN: Soft, Nontender, Nondistended; Bowel sounds present  EXTREMITIES:  2+ Peripheral Pulses, No clubbing, cyanosis, or edema  NEUROLOGICAL: Grossly nonfocal

## 2022-12-30 NOTE — PROGRESS NOTE ADULT - SUBJECTIVE AND OBJECTIVE BOX
DATE OF SERVICE: 12-30-22 @ 07:10    Patient is a 87y old  Female who presents with a chief complaint of Syncope (29 Dec 2022 11:00)      SUBJECTIVE / OVERNIGHT EVENTS:  Overnight, given artificial tears for dry eyes.   Pt seen and examined at bedside.    ROS negative except as above.    MEDICATIONS  (STANDING):  albuterol/ipratropium for Nebulization 3 milliLiter(s) Nebulizer every 6 hours  artificial tears (preservative free) Ophthalmic Solution 1 Drop(s) Both EYES two times a day  ascorbic acid 500 milliGRAM(s) Oral daily  aspirin enteric coated 81 milliGRAM(s) Oral daily  benzonatate 100 milliGRAM(s) Oral every 8 hours  collagenase Ointment 1 Application(s) Topical daily  dextrose 5% + lactated ringers. 1000 milliLiter(s) (75 mL/Hr) IV Continuous <Continuous>  multivitamin 1 Tablet(s) Oral daily  polyethylene glycol 3350 17 Gram(s) Oral two times a day  rivaroxaban 15 milliGRAM(s) Oral with dinner  senna 2 Tablet(s) Oral at bedtime    MEDICATIONS  (PRN):      Vital Signs Last 24 Hrs  T(C): 36.6 (29 Dec 2022 21:00), Max: 36.6 (29 Dec 2022 21:00)  T(F): 97.8 (29 Dec 2022 21:00), Max: 97.8 (29 Dec 2022 21:00)  HR: 70 (29 Dec 2022 21:00) (70 - 84)  BP: 115/72 (29 Dec 2022 21:00) (115/72 - 124/67)  BP(mean): --  RR: 17 (29 Dec 2022 21:00) (16 - 17)  SpO2: 100% (29 Dec 2022 21:00) (100% - 100%)    Parameters below as of 29 Dec 2022 21:00  Patient On (Oxygen Delivery Method): room air      CAPILLARY BLOOD GLUCOSE        I&O's Summary    29 Dec 2022 07:01  -  30 Dec 2022 07:00  --------------------------------------------------------  IN: 0 mL / OUT: 250 mL / NET: -250 mL        PHYSICAL EXAM:  GENERAL: NAD, well-developed  HEAD:  Atraumatic, Normocephalic  EYES: EOMI, PERRLA, conjunctiva and sclera clear  NECK: Supple, No JVD  CHEST/LUNG: Clear to auscultation bilaterally; No wheeze  HEART: Regular rate and rhythm; No murmurs, rubs, or gallops  ABDOMEN: Soft, Nontender, Nondistended; Bowel sounds present  EXTREMITIES:  2+ Peripheral Pulses, No clubbing, cyanosis, or edema  PSYCH: AAOx3  NEUROLOGY: non-focal  SKIN: No rashes or lesions    LABS:                        9.2    9.59  )-----------( 120      ( 30 Dec 2022 06:05 )             30.3     12-29    151<H>  |  114<H>  |  10  ----------------------------<  82  4.1   |  30  |  0.60    Ca    7.5<L>      29 Dec 2022 06:03  Phos  2.0     12-29  Mg     1.90     12-29    TPro  5.9<L>  /  Alb  1.9<L>  /  TBili  0.3  /  DBili  x   /  AST  30  /  ALT  12  /  AlkPhos  142<H>  12-29              MICRO:      RADIOLOGY & ADDITIONAL TESTS:      Consultant(s) Notes Reviewed:         DATE OF SERVICE: 12-30-22 @ 07:10    Patient is a 87y old  Female who presents with a chief complaint of Syncope (29 Dec 2022 11:00)      SUBJECTIVE / OVERNIGHT EVENTS:  Overnight, given artificial tears for dry eyes. HR labile on tele, fluctuating between . Frequent pauses, longest pause 2.22 sec, asymptomatic.  Pt seen and examined at bedside. Appears to be sleeping comfortably. Unable to interview given pt cannot hear Jordanian .    ROS could not be completed at this time.     MEDICATIONS  (STANDING):  albuterol/ipratropium for Nebulization 3 milliLiter(s) Nebulizer every 6 hours  artificial tears (preservative free) Ophthalmic Solution 1 Drop(s) Both EYES two times a day  ascorbic acid 500 milliGRAM(s) Oral daily  aspirin enteric coated 81 milliGRAM(s) Oral daily  benzonatate 100 milliGRAM(s) Oral every 8 hours  collagenase Ointment 1 Application(s) Topical daily  dextrose 5% + lactated ringers. 1000 milliLiter(s) (75 mL/Hr) IV Continuous <Continuous>  multivitamin 1 Tablet(s) Oral daily  polyethylene glycol 3350 17 Gram(s) Oral two times a day  rivaroxaban 15 milliGRAM(s) Oral with dinner  senna 2 Tablet(s) Oral at bedtime    MEDICATIONS  (PRN):      Vital Signs Last 24 Hrs  T(C): 36.6 (29 Dec 2022 21:00), Max: 36.6 (29 Dec 2022 21:00)  T(F): 97.8 (29 Dec 2022 21:00), Max: 97.8 (29 Dec 2022 21:00)  HR: 70 (29 Dec 2022 21:00) (70 - 84)  BP: 115/72 (29 Dec 2022 21:00) (115/72 - 124/67)  BP(mean): --  RR: 17 (29 Dec 2022 21:00) (16 - 17)  SpO2: 100% (29 Dec 2022 21:00) (100% - 100%)    Parameters below as of 29 Dec 2022 21:00  Patient On (Oxygen Delivery Method): room air      CAPILLARY BLOOD GLUCOSE        I&O's Summary    29 Dec 2022 07:01  -  30 Dec 2022 07:00  --------------------------------------------------------  IN: 0 mL / OUT: 250 mL / NET: -250 mL        PHYSICAL EXAM:  GENERAL: NAD, well-developed  HEAD:  Atraumatic, Normocephalic  EYES: EOMI, PERRLA, conjunctiva and sclera clear  NECK: Supple, No JVD  CHEST/LUNG: Clear to auscultation bilaterally; No wheeze  HEART: Regular rate and rhythm; No murmurs, rubs, or gallops  ABDOMEN: Soft, Nontender, Nondistended; Bowel sounds present  EXTREMITIES:  2+ Peripheral Pulses, No clubbing, cyanosis, or edema  PSYCH: AAOx3  NEUROLOGY: non-focal  SKIN: No rashes or lesions    LABS:                        9.2    9.59  )-----------( 120      ( 30 Dec 2022 06:05 )             30.3     12-29    151<H>  |  114<H>  |  10  ----------------------------<  82  4.1   |  30  |  0.60    Ca    7.5<L>      29 Dec 2022 06:03  Phos  2.0     12-29  Mg     1.90     12-29    TPro  5.9<L>  /  Alb  1.9<L>  /  TBili  0.3  /  DBili  x   /  AST  30  /  ALT  12  /  AlkPhos  142<H>  12-29              MICRO:      RADIOLOGY & ADDITIONAL TESTS:      Consultant(s) Notes Reviewed:         DATE OF SERVICE: 12-30-22 @ 07:10    Patient is a 87y old  Female who presents with a chief complaint of Syncope (29 Dec 2022 11:00)      SUBJECTIVE / OVERNIGHT EVENTS:  Overnight, given artificial tears for dry eyes. HR labile on tele, fluctuating between . Frequent pauses, longest pause 2.22 sec, asymptomatic.  Pt seen and examined at bedside. Appears to be sleeping comfortably. Unable to interview given pt cannot hear Kenyan .    ROS could not be completed at this time.     MEDICATIONS  (STANDING):  albuterol/ipratropium for Nebulization 3 milliLiter(s) Nebulizer every 6 hours  artificial tears (preservative free) Ophthalmic Solution 1 Drop(s) Both EYES two times a day  ascorbic acid 500 milliGRAM(s) Oral daily  aspirin enteric coated 81 milliGRAM(s) Oral daily  benzonatate 100 milliGRAM(s) Oral every 8 hours  collagenase Ointment 1 Application(s) Topical daily  dextrose 5% + lactated ringers. 1000 milliLiter(s) (75 mL/Hr) IV Continuous <Continuous>  multivitamin 1 Tablet(s) Oral daily  polyethylene glycol 3350 17 Gram(s) Oral two times a day  rivaroxaban 15 milliGRAM(s) Oral with dinner  senna 2 Tablet(s) Oral at bedtime    MEDICATIONS  (PRN):      Vital Signs Last 24 Hrs  T(C): 36.6 (29 Dec 2022 21:00), Max: 36.6 (29 Dec 2022 21:00)  T(F): 97.8 (29 Dec 2022 21:00), Max: 97.8 (29 Dec 2022 21:00)  HR: 70 (29 Dec 2022 21:00) (70 - 84)  BP: 115/72 (29 Dec 2022 21:00) (115/72 - 124/67)  BP(mean): --  RR: 17 (29 Dec 2022 21:00) (16 - 17)  SpO2: 100% (29 Dec 2022 21:00) (100% - 100%)    Parameters below as of 29 Dec 2022 21:00  Patient On (Oxygen Delivery Method): room air      CAPILLARY BLOOD GLUCOSE        I&O's Summary    29 Dec 2022 07:01  -  30 Dec 2022 07:00  --------------------------------------------------------  IN: 0 mL / OUT: 250 mL / NET: -250 mL        PHYSICAL EXAM:  GENERAL: frail elderly woman, NAD  HEAD:  Atraumatic, Normocephalic  EYES: EOMI, PERRLA, conjunctiva and sclera clear  NECK: Supple, No JVD  CHEST/LUNG: Clear to auscultation bilaterally; No wheeze  HEART: irregularly irregular  ABDOMEN: Soft, Nontender, Nondistended; Bowel sounds present  EXTREMITIES:  1+ pitting edema b/l, erythema with well defined line of demarcation b/l  PSYCH: AAOx3  NEUROLOGY: non-focal  SKIN: No rashes or lesions    LABS:                        9.2    9.59  )-----------( 120      ( 30 Dec 2022 06:05 )             30.3     12-29    151<H>  |  114<H>  |  10  ----------------------------<  82  4.1   |  30  |  0.60    Ca    7.5<L>      29 Dec 2022 06:03  Phos  2.0     12-29  Mg     1.90     12-29    TPro  5.9<L>  /  Alb  1.9<L>  /  TBili  0.3  /  DBili  x   /  AST  30  /  ALT  12  /  AlkPhos  142<H>  12-29              MICRO:      RADIOLOGY & ADDITIONAL TESTS:      Consultant(s) Notes Reviewed:

## 2022-12-31 NOTE — PROGRESS NOTE ADULT - PROBLEM SELECTOR PLAN 4
Pt with hx of hyperthyroidism. 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

## 2022-12-31 NOTE — PROGRESS NOTE ADULT - PROBLEM SELECTOR PLAN 8
DVT: Xarelto  Diet: minced and moist  Dispo: Pending clinical improvement DVT: Xarelto  Diet: minced and moist  Dispo: PT recommending rehab, to be discussed with daughter

## 2022-12-31 NOTE — PROGRESS NOTE ADULT - ATTENDING COMMENTS
Patient seen and examined with daughter at bedside. Denies any pain or complaints at this time. Ate most of her lunch with daughter.  HR continues to fluctuate on telemetry, continues to have some pauses, asymptomatic  EP follow up appreciated, holding home sotalol due to concern about pause and bradycardia (will likely not be resumed upon discharge)  TTE shows normal EF but shows-severe MR, mild AS, mild-mod AI, severely dilated LA, severe TR, severe pulmonary HTN  Syncope, likely due to pauses episodes (sotalol related) and TTE findings likely also contributing to syncope-severe MR and severe TR  Team discussed with patient's cards (Dr. Ramires) who states that pt's valvular disease is already known and suggests for patient to get a PPM placed on this admission, EP consulted and recommended checking ambulatory HR. Depending on the result, will determine if she would be a candidate for EP procedure  Patient remains on A.Fib-permanent atrial fibrillation, c/w xarelto  Elevated TSH/Free T4 on low side of normal, apprec Endocrine recs (verbal recs), methimazole dose decreased to 2.5mg QD  Hyppernatremia improved s/p D5, monitor BMP  PT evaluation --> DEISY (daughter Laura is in agreement with it)  Discussed with daughter at bedside.  Rest as above .

## 2022-12-31 NOTE — PROGRESS NOTE ADULT - SUBJECTIVE AND OBJECTIVE BOX
DATE OF SERVICE: 12-31-22 @ 07:05    Patient is a 87y old  Female who presents with a chief complaint of Syncope (30 Dec 2022 10:48)      SUBJECTIVE / OVERNIGHT EVENTS:  Overnight, no events.   Pt seen and examined at bedside.    ROS negative except as above.    MEDICATIONS  (STANDING):  albuterol/ipratropium for Nebulization 3 milliLiter(s) Nebulizer every 6 hours  artificial tears (preservative free) Ophthalmic Solution 1 Drop(s) Both EYES two times a day  ascorbic acid 500 milliGRAM(s) Oral daily  aspirin enteric coated 81 milliGRAM(s) Oral daily  benzonatate 100 milliGRAM(s) Oral every 8 hours  collagenase Ointment 1 Application(s) Topical daily  dextrose 5% + lactated ringers. 1000 milliLiter(s) (75 mL/Hr) IV Continuous <Continuous>  methimazole 2.5 milliGRAM(s) Oral daily  multivitamin 1 Tablet(s) Oral daily  polyethylene glycol 3350 17 Gram(s) Oral two times a day  rivaroxaban 15 milliGRAM(s) Oral with dinner  senna 2 Tablet(s) Oral at bedtime    MEDICATIONS  (PRN):      Vital Signs Last 24 Hrs  T(C): 36.6 (31 Dec 2022 05:40), Max: 36.7 (30 Dec 2022 21:00)  T(F): 97.8 (31 Dec 2022 05:40), Max: 98 (30 Dec 2022 21:00)  HR: 84 (31 Dec 2022 05:40) (80 - 84)  BP: 126/72 (31 Dec 2022 05:40) (122/76 - 126/72)  BP(mean): --  RR: 18 (31 Dec 2022 05:40) (18 - 18)  SpO2: 100% (31 Dec 2022 05:40) (100% - 100%)    Parameters below as of 31 Dec 2022 05:40  Patient On (Oxygen Delivery Method): room air      CAPILLARY BLOOD GLUCOSE        I&O's Summary      PHYSICAL EXAM:  GENERAL: NAD, well-developed  HEAD:  Atraumatic, Normocephalic  EYES: EOMI, PERRLA, conjunctiva and sclera clear  NECK: Supple, No JVD  CHEST/LUNG: Clear to auscultation bilaterally; No wheeze  HEART: Regular rate and rhythm; No murmurs, rubs, or gallops  ABDOMEN: Soft, Nontender, Nondistended; Bowel sounds present  EXTREMITIES:  2+ Peripheral Pulses, No clubbing, cyanosis, or edema  PSYCH: AAOx3  NEUROLOGY: non-focal  SKIN: No rashes or lesions    LABS:                        9.2    9.59  )-----------( 120      ( 30 Dec 2022 06:05 )             30.3     12-30    148<H>  |  113<H>  |  7   ----------------------------<  100<H>  4.1   |  30  |  0.47<L>    Ca    7.2<L>      30 Dec 2022 06:05  Phos  1.7     12-30  Mg     2.10     12-30                MICRO:      RADIOLOGY & ADDITIONAL TESTS:      Consultant(s) Notes Reviewed:         DATE OF SERVICE: 12-31-22 @ 07:05    Patient is a 87y old  Female who presents with a chief complaint of Syncope (30 Dec 2022 10:48)      SUBJECTIVE / OVERNIGHT EVENTS:  Overnight, review of tele showing Afib/flutter, rates as low as 53 as high as 150 however not sustained, average rates between 60s-80s. Again with frequent sinus pauses, longest pause 2.5 seconds, asymptomatic.  Pt seen and examined at bedside. Comfortable appearing this AM. Unable to interview given cannot hear Uzbek .     ROS could not be completed at this time.     MEDICATIONS  (STANDING):  albuterol/ipratropium for Nebulization 3 milliLiter(s) Nebulizer every 6 hours  artificial tears (preservative free) Ophthalmic Solution 1 Drop(s) Both EYES two times a day  ascorbic acid 500 milliGRAM(s) Oral daily  aspirin enteric coated 81 milliGRAM(s) Oral daily  benzonatate 100 milliGRAM(s) Oral every 8 hours  collagenase Ointment 1 Application(s) Topical daily  dextrose 5% + lactated ringers. 1000 milliLiter(s) (75 mL/Hr) IV Continuous <Continuous>  methimazole 2.5 milliGRAM(s) Oral daily  multivitamin 1 Tablet(s) Oral daily  polyethylene glycol 3350 17 Gram(s) Oral two times a day  rivaroxaban 15 milliGRAM(s) Oral with dinner  senna 2 Tablet(s) Oral at bedtime    MEDICATIONS  (PRN):      Vital Signs Last 24 Hrs  T(C): 36.6 (31 Dec 2022 05:40), Max: 36.7 (30 Dec 2022 21:00)  T(F): 97.8 (31 Dec 2022 05:40), Max: 98 (30 Dec 2022 21:00)  HR: 84 (31 Dec 2022 05:40) (80 - 84)  BP: 126/72 (31 Dec 2022 05:40) (122/76 - 126/72)  BP(mean): --  RR: 18 (31 Dec 2022 05:40) (18 - 18)  SpO2: 100% (31 Dec 2022 05:40) (100% - 100%)    Parameters below as of 31 Dec 2022 05:40  Patient On (Oxygen Delivery Method): room air      CAPILLARY BLOOD GLUCOSE        I&O's Summary      PHYSICAL EXAM:  GENERAL: frail, elderly appearing  HEAD:  Atraumatic, Normocephalic  EYES: EOMI, PERRLA, conjunctiva and sclera clear  NECK: Supple, No JVD  CHEST/LUNG: Clear to auscultation bilaterally; No wheeze  HEART: Regular rate and rhythm; No murmurs, rubs, or gallops  ABDOMEN: mildly tender to palpation, ?paraumbilical hernia vs distension from stool burden, no peritoneal signs  EXTREMITIES:  2+ Peripheral Pulses, No clubbing, cyanosis, or edema  PSYCH: AAOx2  NEUROLOGY: non-focal  SKIN: No rashes or lesions    LABS:                        9.2    9.59  )-----------( 120      ( 30 Dec 2022 06:05 )             30.3     12-30    148<H>  |  113<H>  |  7   ----------------------------<  100<H>  4.1   |  30  |  0.47<L>    Ca    7.2<L>      30 Dec 2022 06:05  Phos  1.7     12-30  Mg     2.10     12-30                MICRO:      RADIOLOGY & ADDITIONAL TESTS:      Consultant(s) Notes Reviewed:         DATE OF SERVICE: 12-31-22 @ 07:05    Patient is a 87y old  Female who presents with a chief complaint of Syncope (30 Dec 2022 10:48)      SUBJECTIVE / OVERNIGHT EVENTS:  Overnight, review of tele showing Afib/flutter, rates as low as 53 as high as 150 however not sustained, average rates between 60s-80s. Again with frequent sinus pauses, longest pause 2.5 seconds, asymptomatic.  Pt seen and examined at bedside. Comfortable appearing this AM. Unable to interview given cannot hear Guyanese .     ROS could not be completed at this time.     MEDICATIONS  (STANDING):  albuterol/ipratropium for Nebulization 3 milliLiter(s) Nebulizer every 6 hours  artificial tears (preservative free) Ophthalmic Solution 1 Drop(s) Both EYES two times a day  ascorbic acid 500 milliGRAM(s) Oral daily  aspirin enteric coated 81 milliGRAM(s) Oral daily  benzonatate 100 milliGRAM(s) Oral every 8 hours  collagenase Ointment 1 Application(s) Topical daily  dextrose 5% + lactated ringers. 1000 milliLiter(s) (75 mL/Hr) IV Continuous <Continuous>  methimazole 2.5 milliGRAM(s) Oral daily  multivitamin 1 Tablet(s) Oral daily  polyethylene glycol 3350 17 Gram(s) Oral two times a day  rivaroxaban 15 milliGRAM(s) Oral with dinner  senna 2 Tablet(s) Oral at bedtime    MEDICATIONS  (PRN):      Vital Signs Last 24 Hrs  T(C): 36.6 (31 Dec 2022 05:40), Max: 36.7 (30 Dec 2022 21:00)  T(F): 97.8 (31 Dec 2022 05:40), Max: 98 (30 Dec 2022 21:00)  HR: 84 (31 Dec 2022 05:40) (80 - 84)  BP: 126/72 (31 Dec 2022 05:40) (122/76 - 126/72)  BP(mean): --  RR: 18 (31 Dec 2022 05:40) (18 - 18)  SpO2: 100% (31 Dec 2022 05:40) (100% - 100%)    Parameters below as of 31 Dec 2022 05:40  Patient On (Oxygen Delivery Method): room air      CAPILLARY BLOOD GLUCOSE        I&O's Summary      PHYSICAL EXAM:  GENERAL: frail, elderly appearing  HEAD:  Atraumatic, Normocephalic  EYES: EOMI, PERRLA, conjunctiva and sclera clear  NECK: Supple, No JVD  CHEST/LUNG: Clear to auscultation bilaterally; No wheeze  HEART: irregularly irregular rhythm; No murmurs, rubs, or gallops  ABDOMEN: mildly tender to palpation, ?paraumbilical hernia vs distension from stool burden, no peritoneal signs  EXTREMITIES:  2+ Peripheral Pulses, No clubbing, cyanosis, or edema  PSYCH: AAOx2  NEUROLOGY: non-focal  SKIN: No rashes or lesions    LABS:                        9.2    9.59  )-----------( 120      ( 30 Dec 2022 06:05 )             30.3     12-30    148<H>  |  113<H>  |  7   ----------------------------<  100<H>  4.1   |  30  |  0.47<L>    Ca    7.2<L>      30 Dec 2022 06:05  Phos  1.7     12-30  Mg     2.10     12-30                MICRO:      RADIOLOGY & ADDITIONAL TESTS:      Consultant(s) Notes Reviewed:

## 2022-12-31 NOTE — PROGRESS NOTE ADULT - PROBLEM SELECTOR PLAN 1
Pt presenting with syncopal episode i/s/o earing suggestive of situational syncope/vasovagal i/s/o swallowing. Tele with slow Afib with frequent episodes of pauses (longest pause 3.1 seconds though asymptomatic, stable BP) raising concern for cardiac syncope. Hypothyroidism and metabolic causes also on differential  TTE with severe TR, MR. Severely dilated LA. Normal LV systolic function. Reduced RV systolic function. Severe pulmonary HTN.  - EP following, appreciate recs  - Monitor on Tele  - Hold sotalol  - replete lytes as needed  - given TTE findings, will reach out to outpatient cardiologist Dr. Sami Ramires to see if any interventions are indicated  - spoke with Dr. Ramires 12/30, he said pt may be a candidate for pacemaker given frequent sinus pauses if concerned for sick sinus syndrome, will discuss with EP if this is a possibility Pt presenting with syncopal episode i/s/o earing suggestive of situational syncope/vasovagal i/s/o swallowing. Tele with slow Afib with frequent episodes of pauses (longest pause 3.1 seconds though asymptomatic, stable BP) raising concern for cardiac syncope. Hypothyroidism and metabolic causes also on differential  TTE with severe TR, MR. Severely dilated LA. Normal LV systolic function. Reduced RV systolic function. Severe pulmonary HTN.  - EP following, appreciate recs  - Monitor on Tele  - Hold sotalol  - replete lytes as needed  - given TTE findings, will reach out to outpatient cardiologist Dr. Sami Ramires to see if any interventions are indicated  - spoke with Dr. Ramires 12/30, he said pt may be a candidate for pacemaker given frequent sinus pauses if concerned for sick sinus syndrome, will discuss with EP if this is a possibility  - EP recommending documenting heart rate with ambulation; if HR increases with ambulation this would demonstrate an adequate response and less likely to need an EP intervention, however if HR fails to raise with ambulation would likely need ablation vs. pacemaker. will have PT try to ambulate pt

## 2023-01-01 ENCOUNTER — TRANSCRIPTION ENCOUNTER (OUTPATIENT)
Age: 88
End: 2023-01-01

## 2023-01-01 ENCOUNTER — APPOINTMENT (OUTPATIENT)
Dept: ELECTROPHYSIOLOGY | Facility: CLINIC | Age: 88
End: 2023-01-01
Payer: MEDICARE

## 2023-01-01 VITALS
SYSTOLIC BLOOD PRESSURE: 111 MMHG | OXYGEN SATURATION: 96 % | RESPIRATION RATE: 18 BRPM | HEART RATE: 74 BPM | TEMPERATURE: 97 F | DIASTOLIC BLOOD PRESSURE: 75 MMHG

## 2023-01-01 DIAGNOSIS — I45.5 OTHER SPECIFIED HEART BLOCK: ICD-10-CM

## 2023-01-01 DIAGNOSIS — E05.90 THYROTOXICOSIS, UNSPECIFIED W/OUT THYROTOXIC CRISIS OR STORM: ICD-10-CM

## 2023-01-01 DIAGNOSIS — I48.91 UNSPECIFIED ATRIAL FIBRILLATION: ICD-10-CM

## 2023-01-01 DIAGNOSIS — R55 SYNCOPE AND COLLAPSE: ICD-10-CM

## 2023-01-01 DIAGNOSIS — Z86.19 PERSONAL HISTORY OF OTHER INFECTIOUS AND PARASITIC DISEASES: ICD-10-CM

## 2023-01-01 LAB
ANION GAP SERPL CALC-SCNC: 10 MMOL/L — SIGNIFICANT CHANGE UP (ref 7–14)
ANION GAP SERPL CALC-SCNC: 3 MMOL/L — LOW (ref 7–14)
ANION GAP SERPL CALC-SCNC: 5 MMOL/L — LOW (ref 7–14)
ANION GAP SERPL CALC-SCNC: 5 MMOL/L — LOW (ref 7–14)
ANION GAP SERPL CALC-SCNC: 6 MMOL/L — LOW (ref 7–14)
ANION GAP SERPL CALC-SCNC: 7 MMOL/L — SIGNIFICANT CHANGE UP (ref 7–14)
APTT BLD: 38 SEC — HIGH (ref 27–36.3)
BASOPHILS # BLD AUTO: 0.01 K/UL — SIGNIFICANT CHANGE UP (ref 0–0.2)
BASOPHILS NFR BLD AUTO: 0.1 % — SIGNIFICANT CHANGE UP (ref 0–2)
BLD GP AB SCN SERPL QL: NEGATIVE — SIGNIFICANT CHANGE UP
BUN SERPL-MCNC: 10 MG/DL — SIGNIFICANT CHANGE UP (ref 7–23)
BUN SERPL-MCNC: 11 MG/DL — SIGNIFICANT CHANGE UP (ref 7–23)
BUN SERPL-MCNC: 12 MG/DL — SIGNIFICANT CHANGE UP (ref 7–23)
BUN SERPL-MCNC: 13 MG/DL — SIGNIFICANT CHANGE UP (ref 7–23)
CALCIUM SERPL-MCNC: 7.4 MG/DL — LOW (ref 8.4–10.5)
CALCIUM SERPL-MCNC: 7.5 MG/DL — LOW (ref 8.4–10.5)
CALCIUM SERPL-MCNC: 7.6 MG/DL — LOW (ref 8.4–10.5)
CALCIUM SERPL-MCNC: 7.7 MG/DL — LOW (ref 8.4–10.5)
CHLORIDE SERPL-SCNC: 107 MMOL/L — SIGNIFICANT CHANGE UP (ref 98–107)
CHLORIDE SERPL-SCNC: 107 MMOL/L — SIGNIFICANT CHANGE UP (ref 98–107)
CHLORIDE SERPL-SCNC: 108 MMOL/L — HIGH (ref 98–107)
CHLORIDE SERPL-SCNC: 110 MMOL/L — HIGH (ref 98–107)
CHLORIDE SERPL-SCNC: 111 MMOL/L — HIGH (ref 98–107)
CHLORIDE SERPL-SCNC: 111 MMOL/L — HIGH (ref 98–107)
CO2 SERPL-SCNC: 21 MMOL/L — LOW (ref 22–31)
CO2 SERPL-SCNC: 22 MMOL/L — SIGNIFICANT CHANGE UP (ref 22–31)
CO2 SERPL-SCNC: 24 MMOL/L — SIGNIFICANT CHANGE UP (ref 22–31)
CO2 SERPL-SCNC: 25 MMOL/L — SIGNIFICANT CHANGE UP (ref 22–31)
CO2 SERPL-SCNC: 26 MMOL/L — SIGNIFICANT CHANGE UP (ref 22–31)
CO2 SERPL-SCNC: 27 MMOL/L — SIGNIFICANT CHANGE UP (ref 22–31)
CREAT SERPL-MCNC: 0.45 MG/DL — LOW (ref 0.5–1.3)
CREAT SERPL-MCNC: 0.46 MG/DL — LOW (ref 0.5–1.3)
CREAT SERPL-MCNC: 0.46 MG/DL — LOW (ref 0.5–1.3)
CREAT SERPL-MCNC: 0.49 MG/DL — LOW (ref 0.5–1.3)
CREAT SERPL-MCNC: 0.5 MG/DL — SIGNIFICANT CHANGE UP (ref 0.5–1.3)
CREAT SERPL-MCNC: 0.52 MG/DL — SIGNIFICANT CHANGE UP (ref 0.5–1.3)
EGFR: 90 ML/MIN/1.73M2 — SIGNIFICANT CHANGE UP
EGFR: 91 ML/MIN/1.73M2 — SIGNIFICANT CHANGE UP
EGFR: 92 ML/MIN/1.73M2 — SIGNIFICANT CHANGE UP
EGFR: 93 ML/MIN/1.73M2 — SIGNIFICANT CHANGE UP
EGFR: 93 ML/MIN/1.73M2 — SIGNIFICANT CHANGE UP
EGFR: 94 ML/MIN/1.73M2 — SIGNIFICANT CHANGE UP
EOSINOPHIL # BLD AUTO: 0.11 K/UL — SIGNIFICANT CHANGE UP (ref 0–0.5)
EOSINOPHIL NFR BLD AUTO: 1.3 % — SIGNIFICANT CHANGE UP (ref 0–6)
GLUCOSE BLDC GLUCOMTR-MCNC: 103 MG/DL — HIGH (ref 70–99)
GLUCOSE BLDC GLUCOMTR-MCNC: 99 MG/DL — SIGNIFICANT CHANGE UP (ref 70–99)
GLUCOSE SERPL-MCNC: 124 MG/DL — HIGH (ref 70–99)
GLUCOSE SERPL-MCNC: 76 MG/DL — SIGNIFICANT CHANGE UP (ref 70–99)
GLUCOSE SERPL-MCNC: 88 MG/DL — SIGNIFICANT CHANGE UP (ref 70–99)
GLUCOSE SERPL-MCNC: 89 MG/DL — SIGNIFICANT CHANGE UP (ref 70–99)
GLUCOSE SERPL-MCNC: 92 MG/DL — SIGNIFICANT CHANGE UP (ref 70–99)
GLUCOSE SERPL-MCNC: 97 MG/DL — SIGNIFICANT CHANGE UP (ref 70–99)
HCT VFR BLD CALC: 26.8 % — LOW (ref 34.5–45)
HCT VFR BLD CALC: 28 % — LOW (ref 34.5–45)
HCT VFR BLD CALC: 30.1 % — LOW (ref 34.5–45)
HCT VFR BLD CALC: 30.2 % — LOW (ref 34.5–45)
HCT VFR BLD CALC: 35.9 % — SIGNIFICANT CHANGE UP (ref 34.5–45)
HGB BLD-MCNC: 10.7 G/DL — LOW (ref 11.5–15.5)
HGB BLD-MCNC: 8.1 G/DL — LOW (ref 11.5–15.5)
HGB BLD-MCNC: 8.5 G/DL — LOW (ref 11.5–15.5)
HGB BLD-MCNC: 9 G/DL — LOW (ref 11.5–15.5)
HGB BLD-MCNC: 9.1 G/DL — LOW (ref 11.5–15.5)
IANC: 5.59 K/UL — SIGNIFICANT CHANGE UP (ref 1.8–7.4)
IMM GRANULOCYTES NFR BLD AUTO: 0.6 % — SIGNIFICANT CHANGE UP (ref 0–0.9)
INR BLD: 2.65 RATIO — HIGH (ref 0.88–1.16)
LYMPHOCYTES # BLD AUTO: 1.86 K/UL — SIGNIFICANT CHANGE UP (ref 1–3.3)
LYMPHOCYTES # BLD AUTO: 22.7 % — SIGNIFICANT CHANGE UP (ref 13–44)
MAGNESIUM SERPL-MCNC: 1.9 MG/DL — SIGNIFICANT CHANGE UP (ref 1.6–2.6)
MAGNESIUM SERPL-MCNC: 1.9 MG/DL — SIGNIFICANT CHANGE UP (ref 1.6–2.6)
MAGNESIUM SERPL-MCNC: 2 MG/DL — SIGNIFICANT CHANGE UP (ref 1.6–2.6)
MAGNESIUM SERPL-MCNC: 2 MG/DL — SIGNIFICANT CHANGE UP (ref 1.6–2.6)
MAGNESIUM SERPL-MCNC: 2.1 MG/DL — SIGNIFICANT CHANGE UP (ref 1.6–2.6)
MAGNESIUM SERPL-MCNC: 2.4 MG/DL — SIGNIFICANT CHANGE UP (ref 1.6–2.6)
MCHC RBC-ENTMCNC: 28 PG — SIGNIFICANT CHANGE UP (ref 27–34)
MCHC RBC-ENTMCNC: 28.3 PG — SIGNIFICANT CHANGE UP (ref 27–34)
MCHC RBC-ENTMCNC: 28.8 PG — SIGNIFICANT CHANGE UP (ref 27–34)
MCHC RBC-ENTMCNC: 28.9 PG — SIGNIFICANT CHANGE UP (ref 27–34)
MCHC RBC-ENTMCNC: 29.3 PG — SIGNIFICANT CHANGE UP (ref 27–34)
MCHC RBC-ENTMCNC: 29.8 GM/DL — LOW (ref 32–36)
MCHC RBC-ENTMCNC: 29.9 GM/DL — LOW (ref 32–36)
MCHC RBC-ENTMCNC: 30.1 GM/DL — LOW (ref 32–36)
MCHC RBC-ENTMCNC: 30.2 GM/DL — LOW (ref 32–36)
MCHC RBC-ENTMCNC: 30.4 GM/DL — LOW (ref 32–36)
MCV RBC AUTO: 93.8 FL — SIGNIFICANT CHANGE UP (ref 80–100)
MCV RBC AUTO: 94.1 FL — SIGNIFICANT CHANGE UP (ref 80–100)
MCV RBC AUTO: 95.7 FL — SIGNIFICANT CHANGE UP (ref 80–100)
MCV RBC AUTO: 96.5 FL — SIGNIFICANT CHANGE UP (ref 80–100)
MCV RBC AUTO: 96.6 FL — SIGNIFICANT CHANGE UP (ref 80–100)
MONOCYTES # BLD AUTO: 0.57 K/UL — SIGNIFICANT CHANGE UP (ref 0–0.9)
MONOCYTES NFR BLD AUTO: 7 % — SIGNIFICANT CHANGE UP (ref 2–14)
NEUTROPHILS # BLD AUTO: 5.59 K/UL — SIGNIFICANT CHANGE UP (ref 1.8–7.4)
NEUTROPHILS NFR BLD AUTO: 68.3 % — SIGNIFICANT CHANGE UP (ref 43–77)
NRBC # BLD: 0 /100 WBCS — SIGNIFICANT CHANGE UP (ref 0–0)
NRBC # FLD: 0 K/UL — SIGNIFICANT CHANGE UP (ref 0–0)
NRBC # FLD: 0.02 K/UL — HIGH (ref 0–0)
PHOSPHATE SERPL-MCNC: 1.9 MG/DL — LOW (ref 2.5–4.5)
PHOSPHATE SERPL-MCNC: 2.2 MG/DL — LOW (ref 2.5–4.5)
PHOSPHATE SERPL-MCNC: 2.3 MG/DL — LOW (ref 2.5–4.5)
PHOSPHATE SERPL-MCNC: 2.5 MG/DL — SIGNIFICANT CHANGE UP (ref 2.5–4.5)
PHOSPHATE SERPL-MCNC: 2.5 MG/DL — SIGNIFICANT CHANGE UP (ref 2.5–4.5)
PHOSPHATE SERPL-MCNC: 2.8 MG/DL — SIGNIFICANT CHANGE UP (ref 2.5–4.5)
PLATELET # BLD AUTO: 105 K/UL — LOW (ref 150–400)
PLATELET # BLD AUTO: 110 K/UL — LOW (ref 150–400)
PLATELET # BLD AUTO: 112 K/UL — LOW (ref 150–400)
PLATELET # BLD AUTO: 112 K/UL — LOW (ref 150–400)
PLATELET # BLD AUTO: 96 K/UL — LOW (ref 150–400)
POTASSIUM SERPL-MCNC: 4.4 MMOL/L — SIGNIFICANT CHANGE UP (ref 3.5–5.3)
POTASSIUM SERPL-MCNC: 4.5 MMOL/L — SIGNIFICANT CHANGE UP (ref 3.5–5.3)
POTASSIUM SERPL-MCNC: 4.5 MMOL/L — SIGNIFICANT CHANGE UP (ref 3.5–5.3)
POTASSIUM SERPL-MCNC: 4.9 MMOL/L — SIGNIFICANT CHANGE UP (ref 3.5–5.3)
POTASSIUM SERPL-MCNC: 5.4 MMOL/L — HIGH (ref 3.5–5.3)
POTASSIUM SERPL-MCNC: 5.4 MMOL/L — HIGH (ref 3.5–5.3)
POTASSIUM SERPL-SCNC: 4.4 MMOL/L — SIGNIFICANT CHANGE UP (ref 3.5–5.3)
POTASSIUM SERPL-SCNC: 4.5 MMOL/L — SIGNIFICANT CHANGE UP (ref 3.5–5.3)
POTASSIUM SERPL-SCNC: 4.5 MMOL/L — SIGNIFICANT CHANGE UP (ref 3.5–5.3)
POTASSIUM SERPL-SCNC: 4.9 MMOL/L — SIGNIFICANT CHANGE UP (ref 3.5–5.3)
POTASSIUM SERPL-SCNC: 5.4 MMOL/L — HIGH (ref 3.5–5.3)
POTASSIUM SERPL-SCNC: 5.4 MMOL/L — HIGH (ref 3.5–5.3)
PROTHROM AB SERPL-ACNC: 31.1 SEC — HIGH (ref 10.5–13.4)
RBC # BLD: 2.8 M/UL — LOW (ref 3.8–5.2)
RBC # BLD: 2.9 M/UL — LOW (ref 3.8–5.2)
RBC # BLD: 3.21 M/UL — LOW (ref 3.8–5.2)
RBC # BLD: 3.21 M/UL — LOW (ref 3.8–5.2)
RBC # BLD: 3.72 M/UL — LOW (ref 3.8–5.2)
RBC # FLD: 16.9 % — HIGH (ref 10.3–14.5)
RBC # FLD: 17 % — HIGH (ref 10.3–14.5)
RBC # FLD: 17 % — HIGH (ref 10.3–14.5)
RBC # FLD: 17.2 % — HIGH (ref 10.3–14.5)
RBC # FLD: 17.4 % — HIGH (ref 10.3–14.5)
RH IG SCN BLD-IMP: NEGATIVE — SIGNIFICANT CHANGE UP
SARS-COV-2 RNA SPEC QL NAA+PROBE: SIGNIFICANT CHANGE UP
SODIUM SERPL-SCNC: 136 MMOL/L — SIGNIFICANT CHANGE UP (ref 135–145)
SODIUM SERPL-SCNC: 137 MMOL/L — SIGNIFICANT CHANGE UP (ref 135–145)
SODIUM SERPL-SCNC: 140 MMOL/L — SIGNIFICANT CHANGE UP (ref 135–145)
SODIUM SERPL-SCNC: 140 MMOL/L — SIGNIFICANT CHANGE UP (ref 135–145)
SODIUM SERPL-SCNC: 141 MMOL/L — SIGNIFICANT CHANGE UP (ref 135–145)
SODIUM SERPL-SCNC: 141 MMOL/L — SIGNIFICANT CHANGE UP (ref 135–145)
WBC # BLD: 11.33 K/UL — HIGH (ref 3.8–10.5)
WBC # BLD: 6.5 K/UL — SIGNIFICANT CHANGE UP (ref 3.8–10.5)
WBC # BLD: 7.06 K/UL — SIGNIFICANT CHANGE UP (ref 3.8–10.5)
WBC # BLD: 7.51 K/UL — SIGNIFICANT CHANGE UP (ref 3.8–10.5)
WBC # BLD: 8.19 K/UL — SIGNIFICANT CHANGE UP (ref 3.8–10.5)
WBC # FLD AUTO: 11.33 K/UL — HIGH (ref 3.8–10.5)
WBC # FLD AUTO: 6.5 K/UL — SIGNIFICANT CHANGE UP (ref 3.8–10.5)
WBC # FLD AUTO: 7.06 K/UL — SIGNIFICANT CHANGE UP (ref 3.8–10.5)
WBC # FLD AUTO: 7.51 K/UL — SIGNIFICANT CHANGE UP (ref 3.8–10.5)
WBC # FLD AUTO: 8.19 K/UL — SIGNIFICANT CHANGE UP (ref 3.8–10.5)

## 2023-01-01 PROCEDURE — 99232 SBSQ HOSP IP/OBS MODERATE 35: CPT | Mod: 24

## 2023-01-01 PROCEDURE — 99232 SBSQ HOSP IP/OBS MODERATE 35: CPT | Mod: GC

## 2023-01-01 PROCEDURE — 99233 SBSQ HOSP IP/OBS HIGH 50: CPT | Mod: GC

## 2023-01-01 PROCEDURE — 93010 ELECTROCARDIOGRAM REPORT: CPT | Mod: 77

## 2023-01-01 PROCEDURE — 33274 TCAT INSJ/RPL PERM LDLS PM: CPT | Mod: Q0

## 2023-01-01 PROCEDURE — 99239 HOSP IP/OBS DSCHRG MGMT >30: CPT | Mod: GC

## 2023-01-01 PROCEDURE — 71045 X-RAY EXAM CHEST 1 VIEW: CPT | Mod: 26

## 2023-01-01 PROCEDURE — 99232 SBSQ HOSP IP/OBS MODERATE 35: CPT

## 2023-01-01 PROCEDURE — 99233 SBSQ HOSP IP/OBS HIGH 50: CPT | Mod: 25

## 2023-01-01 PROCEDURE — 99024 POSTOP FOLLOW-UP VISIT: CPT

## 2023-01-01 RX ORDER — PRAVASTATIN SODIUM 20 MG/1
20 TABLET ORAL
Refills: 0 | Status: ACTIVE | COMMUNITY

## 2023-01-01 RX ORDER — POTASSIUM PHOSPHATE, MONOBASIC POTASSIUM PHOSPHATE, DIBASIC 236; 224 MG/ML; MG/ML
15 INJECTION, SOLUTION INTRAVENOUS ONCE
Refills: 0 | Status: COMPLETED | OUTPATIENT
Start: 2023-01-01 | End: 2023-01-01

## 2023-01-01 RX ORDER — SOTALOL HCL 120 MG
1 TABLET ORAL
Qty: 0 | Refills: 0 | DISCHARGE
Start: 2023-01-01

## 2023-01-01 RX ORDER — SODIUM,POTASSIUM PHOSPHATES 278-250MG
1 POWDER IN PACKET (EA) ORAL ONCE
Refills: 0 | Status: COMPLETED | OUTPATIENT
Start: 2023-01-01 | End: 2023-01-01

## 2023-01-01 RX ORDER — MULTIVIT-MIN/FOLIC/VIT K/LYCOP 400-300MCG
500 TABLET ORAL
Refills: 0 | Status: ACTIVE | COMMUNITY

## 2023-01-01 RX ORDER — SOTALOL HCL 120 MG
120 TABLET ORAL
Refills: 0 | Status: DISCONTINUED | OUTPATIENT
Start: 2023-01-01 | End: 2023-01-01

## 2023-01-01 RX ORDER — SOTALOL HCL 120 MG
1 TABLET ORAL
Qty: 0 | Refills: 0 | DISCHARGE

## 2023-01-01 RX ORDER — SOTALOL HCL 120 MG
80 TABLET ORAL EVERY 12 HOURS
Refills: 0 | Status: DISCONTINUED | OUTPATIENT
Start: 2023-01-01 | End: 2023-01-01

## 2023-01-01 RX ORDER — POTASSIUM PHOSPHATE, MONOBASIC POTASSIUM PHOSPHATE, DIBASIC 236; 224 MG/ML; MG/ML
30 INJECTION, SOLUTION INTRAVENOUS ONCE
Refills: 0 | Status: COMPLETED | OUTPATIENT
Start: 2023-01-01 | End: 2023-01-01

## 2023-01-01 RX ORDER — MULTIVITAMIN
TABLET ORAL
Refills: 0 | Status: ACTIVE | COMMUNITY

## 2023-01-01 RX ORDER — CALCIUM GLUCONATE 100 MG/ML
1 VIAL (ML) INTRAVENOUS ONCE
Refills: 0 | Status: COMPLETED | OUTPATIENT
Start: 2023-01-01 | End: 2023-01-01

## 2023-01-01 RX ORDER — MAGNESIUM SULFATE 500 MG/ML
2 VIAL (ML) INJECTION ONCE
Refills: 0 | Status: COMPLETED | OUTPATIENT
Start: 2023-01-01 | End: 2023-01-01

## 2023-01-01 RX ORDER — RIVAROXABAN 15 MG/1
15 TABLET, FILM COATED ORAL
Refills: 0 | Status: ACTIVE | COMMUNITY

## 2023-01-01 RX ORDER — RIVAROXABAN 15 MG-20MG
15 KIT ORAL
Refills: 0 | Status: DISCONTINUED | OUTPATIENT
Start: 2023-01-01 | End: 2023-01-01

## 2023-01-01 RX ORDER — METHIMAZOLE 5 MG/1
TABLET ORAL
Refills: 0 | Status: ACTIVE | COMMUNITY

## 2023-01-01 RX ORDER — FUROSEMIDE 40 MG
1 TABLET ORAL
Qty: 0 | Refills: 0 | DISCHARGE

## 2023-01-01 RX ORDER — SOTALOL HYDROCHLORIDE 80 MG/1
80 TABLET ORAL
Refills: 0 | Status: ACTIVE | COMMUNITY

## 2023-01-01 RX ORDER — SOTALOL HCL 120 MG
80 TABLET ORAL
Refills: 0 | Status: DISCONTINUED | OUTPATIENT
Start: 2023-01-01 | End: 2023-01-01

## 2023-01-01 RX ORDER — METHIMAZOLE 10 MG/1
1 TABLET ORAL
Qty: 0 | Refills: 0 | DISCHARGE

## 2023-01-01 RX ADMIN — Medication 1 APPLICATION(S): at 18:23

## 2023-01-01 RX ADMIN — Medication 3 MILLILITER(S): at 08:33

## 2023-01-01 RX ADMIN — Medication 100 MILLIGRAM(S): at 12:19

## 2023-01-01 RX ADMIN — Medication 1 DROP(S): at 16:16

## 2023-01-01 RX ADMIN — Medication 1 TABLET(S): at 18:25

## 2023-01-01 RX ADMIN — POTASSIUM PHOSPHATE, MONOBASIC POTASSIUM PHOSPHATE, DIBASIC 83.33 MILLIMOLE(S): 236; 224 INJECTION, SOLUTION INTRAVENOUS at 11:34

## 2023-01-01 RX ADMIN — POLYETHYLENE GLYCOL 3350 17 GRAM(S): 17 POWDER, FOR SOLUTION ORAL at 05:37

## 2023-01-01 RX ADMIN — Medication 81 MILLIGRAM(S): at 12:00

## 2023-01-01 RX ADMIN — Medication 100 MILLIGRAM(S): at 05:37

## 2023-01-01 RX ADMIN — Medication 1 APPLICATION(S): at 13:58

## 2023-01-01 RX ADMIN — Medication 100 MILLIGRAM(S): at 11:28

## 2023-01-01 RX ADMIN — Medication 1 TABLET(S): at 11:36

## 2023-01-01 RX ADMIN — POLYETHYLENE GLYCOL 3350 17 GRAM(S): 17 POWDER, FOR SOLUTION ORAL at 05:12

## 2023-01-01 RX ADMIN — Medication 3 MILLILITER(S): at 15:20

## 2023-01-01 RX ADMIN — Medication 500 MILLIGRAM(S): at 11:36

## 2023-01-01 RX ADMIN — POLYETHYLENE GLYCOL 3350 17 GRAM(S): 17 POWDER, FOR SOLUTION ORAL at 05:39

## 2023-01-01 RX ADMIN — Medication 81 MILLIGRAM(S): at 12:16

## 2023-01-01 RX ADMIN — SENNA PLUS 2 TABLET(S): 8.6 TABLET ORAL at 23:15

## 2023-01-01 RX ADMIN — Medication 100 MILLIGRAM(S): at 13:26

## 2023-01-01 RX ADMIN — SENNA PLUS 2 TABLET(S): 8.6 TABLET ORAL at 22:50

## 2023-01-01 RX ADMIN — SENNA PLUS 2 TABLET(S): 8.6 TABLET ORAL at 22:00

## 2023-01-01 RX ADMIN — Medication 1 APPLICATION(S): at 11:40

## 2023-01-01 RX ADMIN — Medication 100 MILLIGRAM(S): at 22:14

## 2023-01-01 RX ADMIN — Medication 2 PACKET(S): at 05:36

## 2023-01-01 RX ADMIN — Medication 3 MILLILITER(S): at 07:32

## 2023-01-01 RX ADMIN — Medication 500 MILLIGRAM(S): at 12:01

## 2023-01-01 RX ADMIN — Medication 1 APPLICATION(S): at 11:10

## 2023-01-01 RX ADMIN — POLYETHYLENE GLYCOL 3350 17 GRAM(S): 17 POWDER, FOR SOLUTION ORAL at 17:15

## 2023-01-01 RX ADMIN — Medication 1 DROP(S): at 05:39

## 2023-01-01 RX ADMIN — Medication 81 MILLIGRAM(S): at 18:24

## 2023-01-01 RX ADMIN — Medication 1 TABLET(S): at 11:29

## 2023-01-01 RX ADMIN — Medication 81 MILLIGRAM(S): at 11:36

## 2023-01-01 RX ADMIN — Medication 100 MILLIGRAM(S): at 22:00

## 2023-01-01 RX ADMIN — RIVAROXABAN 15 MILLIGRAM(S): KIT at 17:09

## 2023-01-01 RX ADMIN — Medication 1 DROP(S): at 17:09

## 2023-01-01 RX ADMIN — Medication 1 TABLET(S): at 12:17

## 2023-01-01 RX ADMIN — RIVAROXABAN 15 MILLIGRAM(S): KIT at 17:03

## 2023-01-01 RX ADMIN — Medication 1 DROP(S): at 05:12

## 2023-01-01 RX ADMIN — POLYETHYLENE GLYCOL 3350 17 GRAM(S): 17 POWDER, FOR SOLUTION ORAL at 16:15

## 2023-01-01 RX ADMIN — Medication 81 MILLIGRAM(S): at 11:28

## 2023-01-01 RX ADMIN — Medication 100 MILLIGRAM(S): at 05:16

## 2023-01-01 RX ADMIN — Medication 3 MILLILITER(S): at 05:38

## 2023-01-01 RX ADMIN — Medication 500 MILLIGRAM(S): at 12:17

## 2023-01-01 RX ADMIN — Medication 100 GRAM(S): at 09:41

## 2023-01-01 RX ADMIN — Medication 100 MILLIGRAM(S): at 23:15

## 2023-01-01 RX ADMIN — POLYETHYLENE GLYCOL 3350 17 GRAM(S): 17 POWDER, FOR SOLUTION ORAL at 17:03

## 2023-01-01 RX ADMIN — Medication 100 MILLIGRAM(S): at 05:13

## 2023-01-01 RX ADMIN — Medication 500 MILLIGRAM(S): at 18:24

## 2023-01-01 RX ADMIN — POLYETHYLENE GLYCOL 3350 17 GRAM(S): 17 POWDER, FOR SOLUTION ORAL at 05:21

## 2023-01-01 RX ADMIN — Medication 1 DROP(S): at 18:27

## 2023-01-01 RX ADMIN — SENNA PLUS 2 TABLET(S): 8.6 TABLET ORAL at 22:14

## 2023-01-01 RX ADMIN — Medication 100 MILLIGRAM(S): at 18:25

## 2023-01-01 RX ADMIN — Medication 100 MILLIGRAM(S): at 12:00

## 2023-01-01 RX ADMIN — Medication 1 PACKET(S): at 12:15

## 2023-01-01 RX ADMIN — Medication 100 MILLIGRAM(S): at 05:38

## 2023-01-01 RX ADMIN — Medication 25 GRAM(S): at 15:55

## 2023-01-01 RX ADMIN — RIVAROXABAN 15 MILLIGRAM(S): KIT at 16:16

## 2023-01-01 RX ADMIN — Medication 1 DROP(S): at 05:37

## 2023-01-01 RX ADMIN — Medication 1 DROP(S): at 17:03

## 2023-01-01 RX ADMIN — POTASSIUM PHOSPHATE, MONOBASIC POTASSIUM PHOSPHATE, DIBASIC 62.5 MILLIMOLE(S): 236; 224 INJECTION, SOLUTION INTRAVENOUS at 11:37

## 2023-01-01 RX ADMIN — Medication 3 MILLILITER(S): at 15:38

## 2023-01-01 RX ADMIN — Medication 1 TABLET(S): at 12:01

## 2023-01-01 RX ADMIN — Medication 100 MILLIGRAM(S): at 05:21

## 2023-01-01 RX ADMIN — Medication 100 MILLIGRAM(S): at 22:50

## 2023-01-01 RX ADMIN — Medication 500 MILLIGRAM(S): at 11:29

## 2023-01-01 RX ADMIN — Medication 3 MILLILITER(S): at 05:15

## 2023-01-01 RX ADMIN — Medication 3 MILLILITER(S): at 09:47

## 2023-01-01 RX ADMIN — Medication 1 DROP(S): at 05:16

## 2023-01-01 RX ADMIN — Medication 1 DROP(S): at 05:22

## 2023-01-01 RX ADMIN — Medication 3 MILLILITER(S): at 22:03

## 2023-01-01 RX ADMIN — Medication 1 APPLICATION(S): at 12:02

## 2023-01-01 NOTE — PROGRESS NOTE ADULT - SUBJECTIVE AND OBJECTIVE BOX
[email or web address]    INTERVAL HPI/OVERNIGHT EVENTS: The patient was noted to remain in persistent atrial fibrillation and to have additional episodes of bradycardia suggestive of atrioventricular block. No episodes of syncope were noted. The patient did not have a repeat evaluation by the physical therapy team.     SUBJECTIVE: Patient seen and examined at bedside.     Of note, my review of systems is neither complete nor thorough because the patient answers questions only minimally. She states that she is feeling well.     OBJECTIVE:    VITAL SIGNS:  T(C): 36.6 (01 Jan 2023 12:27), Max: 36.7 (31 Dec 2022 21:20)  T(F): 97.8 (01 Jan 2023 12:27), Max: 98 (31 Dec 2022 21:20)  HR: 80 (01 Jan 2023 15:38) (76 - 85)  BP: 106/74 (01 Jan 2023 12:27) (106/74 - 120/68)  RR: 18 (01 Jan 2023 12:27) (18 - 18)  SpO2: 99% (01 Jan 2023 12:27) (99% - 99%)    O2 Parameters below as of 01 Jan 2023 12:27  Patient On (Oxygen Delivery Method): room air    12-31 @ 07:01  -  01-01 @ 07:00  --------------------------------------------------------  IN: 700 mL / OUT: 800 mL / NET: -100 mL    PHYSICAL EXAM:    General: NAD; resting in bed comfortably   HEENT: PERRL grossly, clear conjunctiva  Neck: No jvd, no lymphadenopathy  Respiratory: CTA b/l, no rales, no wheezes; equal respiratory excursion   Cardiovascular: +S1/S2; regular rate, irregularly irregular rhythm; systolic ejection murmur heard best at right upper sternal border   Abdomen: soft, NT/ND; +BS x4  Extremities: WWP, 2+ peripheral pulses b/l; no LE edema  Skin: normal color and turgor; no rash  Neurological: No gross motor or sensory deficits; coordination intact     MEDICATIONS:  MEDICATIONS  (STANDING):  albuterol/ipratropium for Nebulization 3 milliLiter(s) Nebulizer every 6 hours  artificial tears (preservative free) Ophthalmic Solution 1 Drop(s) Both EYES two times a day  ascorbic acid 500 milliGRAM(s) Oral daily  aspirin enteric coated 81 milliGRAM(s) Oral daily  benzonatate 100 milliGRAM(s) Oral every 8 hours  collagenase Ointment 1 Application(s) Topical daily  methimazole 2.5 milliGRAM(s) Oral daily  multivitamin 1 Tablet(s) Oral daily  polyethylene glycol 3350 17 Gram(s) Oral two times a day  rivaroxaban 15 milliGRAM(s) Oral with dinner  senna 2 Tablet(s) Oral at bedtime    MEDICATIONS  (PRN):      ALLERGIES:  Allergies    No Known Allergies    Intolerances        LABS:                        10.7   11.33 )-----------( 112      ( 01 Jan 2023 06:30 )             35.9     01-01    141  |  110<H>  |  10  ----------------------------<  89  5.4<H>   |  21<L>  |  0.45<L>    Ca    7.5<L>      01 Jan 2023 06:30  Phos  2.2     01-01  Mg     1.90     01-01            RADIOLOGY & ADDITIONAL TESTS: Reviewed.

## 2023-01-01 NOTE — PROGRESS NOTE ADULT - ASSESSMENT
87-year-old female past medical history of A. fib (on aspirin and Xarelto) on Sotalol, cholecystectomy, hemorrhoids, colitis, recent admission for bloody bowel movement and sepsis secondary to influenza, presents with syncopal episode i/s/o situational syncope vs cardiac syncope. Tele with slow afib with frequent ventricular pauses that persist despite discontinuation of sotalol. The patient is pending telemetry evaluation with ambulation to assess for persistent arrythmia despite adrenergic surge and for the necessity of pacemaker placement.

## 2023-01-01 NOTE — PROGRESS NOTE ADULT - PROBLEM SELECTOR PLAN 6
DVT: Xarelto  Diet: minced and moist  Dispo: PT recommending rehab, to be discussed with daughter and to be reassessed pending evaluation of need for permanent pacemaker placement

## 2023-01-01 NOTE — PROGRESS NOTE ADULT - ATTENDING COMMENTS
Patient seen and examined with daughter at bedside. Denies any pain or complaints at this time. Feel good. Ate most of her lunch with daughter.  Afib/aflutters on tele  EP follow up appreciated, holding home sotalol due to concern about pause and bradycardia (will likely not be resumed upon discharge)  TTE shows normal EF but shows-severe MR, mild AS, mild-mod AI, severely dilated LA, severe TR, severe pulmonary HTN  Syncope, likely due to pauses episodes (sotalol related) and TTE findings likely also contributing to syncope-severe MR and severe TR  Team discussed with patient's cards (Dr. Ramires) who states that pt's valvular disease is already known and suggests for patient to get a PPM placed on this admission, EP consulted and recommended checking ambulatory HR. Depending on the result, will determine if she would be a candidate for EP procedure  Patient remains on A.Fib-permanent atrial fibrillation, c/w xarelto  Elevated TSH/Free T4 on low side of normal, apprec Endocrine recs (verbal recs), methimazole dose decreased to 2.5mg QD  Hyppernatremia improved s/p D5, monitor BMP  PT evaluation --> DEISY (daughter Laura is in agreement with it)  Discussed with daughter at bedside.  Rest as above .

## 2023-01-01 NOTE — PROGRESS NOTE ADULT - PROBLEM SELECTOR PLAN 1
Pt presenting with syncopal episode i/s/o earing suggestive of situational syncope/vasovagal i/s/o swallowing. Tele with slow Afib with frequent episodes of ventricular pauses (longest pause 3.1 seconds though asymptomatic, stable BP) raising concern for cardiac syncope. tsh only slightly elevated, so hypothyroidism less likely to be contributory; no evident electrolyte derangements to explain arrhythmia   TTE with severe TR, MR. Severely dilated LA. Normal LV systolic function. Reduced RV systolic function. Severe pulmonary HTN--per cardiology, valvulopathy is chronic and does not explain syncope   - EP following, appreciate recs  - Monitor on Tele  - Hold sotalol  - replete lytes as needed  - EP recommending documenting heart rate with ambulation; if HR increases with ambulation this would demonstrate an adequate response and less likely to need an EP intervention, however if HR fails to raise with ambulation would likely need ablation vs. pacemaker. will have PT try to ambulate pt

## 2023-01-02 NOTE — PROGRESS NOTE ADULT - ASSESSMENT
87F w/ Afib on AC and Sotalol, cholecystectomy, hemorrhoids, colitis, recent admission for bloody bowel movement and sepsis secondary to influenza who presented with a syncopal episode, found to be in slow Afib on monitor with episodes of pauses occurring on telemetry review. Labs significant for hyperkalemia and elevated TSH and low T3 and FT4. Currently, HDS and asymptomatic.    Assessment and Plan:  - tele  - maintain lytes K>4, Mg >2  - con't NOAC  - stopped sotalol  - leadless PPM to be implanted tomorrow; keep NPO tonight; have active T/S and updated COVID testing per hospital policy    Harry Tolbert MD  Chief Cardiology Fellow PGY-6  [facility]    Notes are not final until signed by attending  For all consults and questions:  [email or web address]   Login: [email or web address]

## 2023-01-02 NOTE — DISCHARGE NOTE PROVIDER - DETAILS OF MALNUTRITION DIAGNOSIS/DIAGNOSES
This patient has been assessed with a concern for Malnutrition and was treated during this hospitalization for the following Nutrition diagnosis/diagnoses:     -  12/29/2022: Severe protein-calorie malnutrition

## 2023-01-02 NOTE — PROGRESS NOTE ADULT - SUBJECTIVE AND OBJECTIVE BOX
Patient seen and examined at bedside.    Overnight Events: no complaints  Tele: Emigdio/jamie    Review of Systems:  REVIEW OF SYSTEMS:  [x ] Unable to assess ROS due to lethargy    Current Meds:  albuterol/ipratropium for Nebulization 3 milliLiter(s) Nebulizer every 6 hours  artificial tears (preservative free) Ophthalmic Solution 1 Drop(s) Both EYES two times a day  ascorbic acid 500 milliGRAM(s) Oral daily  aspirin enteric coated 81 milliGRAM(s) Oral daily  benzonatate 100 milliGRAM(s) Oral every 8 hours  collagenase Ointment 1 Application(s) Topical daily  magnesium sulfate  IVPB 2 Gram(s) IV Intermittent once  methimazole 2.5 milliGRAM(s) Oral daily  multivitamin 1 Tablet(s) Oral daily  polyethylene glycol 3350 17 Gram(s) Oral two times a day  potassium phosphate IVPB 15 milliMole(s) IV Intermittent once  rivaroxaban 15 milliGRAM(s) Oral with dinner  senna 2 Tablet(s) Oral at bedtime      PAST MEDICAL & SURGICAL HISTORY:  Atrial fibrillation      UTI (urinary tract infection)      Hypothyroidism      Colon cancer      Hemorrhoids      HLD (hyperlipidemia)      History of cholecystectomy      Perforated appendicitis      History of cataract surgery          Vitals:  T(F): 97.5 (01-02), Max: 97.8 (01-01)  HR: 82 (01-02) (80 - 96)  BP: 113/75 (01-02) (106/74 - 125/58)  RR: 17 (01-02)  SpO2: 98% (01-02)  I&O's Summary      Physical Exam:  Appearance: sleeping, arousable and nods to commands  HENT: Normal oral mucosa  Cardiovascular: irregularly irregula, S1, S2, no murmurs, rubs, or gallops; no edema; no JVD  Respiratory: Clear to auscultation bilaterally  Gastrointestinal: soft, non-tender, non-distended with normal bowel sounds                          9.0    8.19  )-----------( 105      ( 02 Jan 2023 06:10 )             30.1     01-02    140  |  111<H>  |  10  ----------------------------<  97  4.9   |  26  |  0.46<L>    Ca    7.7<L>      02 Jan 2023 06:10  Phos  2.3     01-02  Mg     1.90     01-02

## 2023-01-02 NOTE — DISCHARGE NOTE PROVIDER - PROVIDER TOKENS
FREE:[LAST:[Keyla],FIRST:[Sami],PHONE:[(   )    -],FAX:[(   )    -],ADDRESS:[[address] [place] [address]],FOLLOWUP:[1 week]],FREE:[LAST:[Your primary care doctor],PHONE:[(   )    -],FAX:[(   )    -],FOLLOWUP:[1 week]]

## 2023-01-02 NOTE — DISCHARGE NOTE PROVIDER - HOSPITAL COURSE
86 yo F with PM of Afib (on asa, Xarelto, and sotalol), recent admission for bloody BM and sepsis 2/2 influenza, hyperthyroidism, presenting with syncopal episode at home. On tele, found to have slow Afib with frequent pauses (longest pause 2.3 sec). Rate as low as 30s. Sotalol was dc'ed given bradycardia and concern for AV warren blockade. Her home dose of methimazole 5 mg daily was also held given pt arrived with elevated TSH (6.17) and low T4 (4.3). Her methimazole was eventually resumed at reduced dose of 2.5 mg daily given normalization of her free T4 and TSH.     Throughout her hospitalization, pt did not have any further episodes of syncope after the sotalol was held. EP was following. TTE revealed severe TR, MR, severely dilated LA, normal LV systolic function, reduced RV systolic function, severe pulmonary HTN. We reached out to the pt's outpatient cardiologist, Dr. Sami Ramires, who said to check in with EP to see if the patient would be a candidate for pacemaker placement. EP wanted to get an ambulatory heart rate; if HR increases with ambulation this would demonstrate and adequate resposne and less likely need for an EP intervention, however if HR fails to raise with ambulation would likely need ablation vs PM. Reached out to PT to ambulate pt. 86 yo F with PM of Afib (on asa, Xarelto, and sotalol), recent admission for bloody BM and sepsis 2/2 influenza, hyperthyroidism, presenting with syncopal episode at home. On tele, found to have slow Afib with frequent pauses (longest pause 2.3 sec). Rate as low as 30s. Sotalol was dc'ed given bradycardia and concern for AV warren blockade. Her home dose of methimazole 5 mg daily was also held given pt arrived with elevated TSH (6.17) and low T4 (4.3). Her methimazole was eventually resumed at reduced dose of 2.5 mg daily given normalization of her free T4 and TSH.     Throughout her hospitalization, pt did not have any further episodes of syncope after the sotalol was held. EP was following. TTE revealed severe TR, MR, severely dilated LA, normal LV systolic function, reduced RV systolic function, severe pulmonary HTN. We reached out to the pt's outpatient cardiologist, Dr. Sami Ramires, who said to check in with EP to see if the patient would be a candidate for pacemaker placement. On 1/3, EP placed a micra leadless PM. The following day, there were no signs of bleeding or infection from the R femoral access site. She should follow up closely with Dr. Fofana to check on her PM. She should also follow up with her primary cardiologist Dr. Ramires within 1 week of discharge, and her PCP within 1 week of discharge. 88 yo F with PM of Afib (on asa, Xarelto, and sotalol), recent admission for bloody BM and sepsis 2/2 influenza, hyperthyroidism, presenting with syncopal episode at home. On tele, found to have slow Afib with frequent pauses (longest pause 2.3 sec). Rate as low as 30s. Sotalol was dc'ed given bradycardia and concern for AV warren blockade. Her home dose of methimazole 5 mg daily was also held given pt arrived with elevated TSH (6.17) and low T4 (4.3). Her methimazole was eventually resumed at reduced dose of 2.5 mg daily given normalization of her free T4 and TSH.     Throughout her hospitalization, pt did not have any further episodes of syncope after the sotalol was held. EP was following. TTE revealed severe TR, MR, severely dilated LA, normal LV systolic function, reduced RV systolic function, severe pulmonary HTN. We reached out to the pt's outpatient cardiologist, Dr. Sami Ramires, who was aware of the severe MR. On 1/3, EP placed a micra leadless pacemaker. The following day, there were no signs of bleeding or infection from the R femoral access site. She should follow up closely with Dr. Fofana to check on her PM. She should also follow up with her primary cardiologist Dr. Ramires within 1 week of discharge, and her PCP within 1 week of discharge. 88 yo F with PM of Afib (on asa, Xarelto, and sotalol), recent admission for bloody BM and sepsis 2/2 influenza, hyperthyroidism, presenting with syncopal episode at home. On tele, found to have slow Afib with frequent pauses (longest pause 2.3 sec). Rate as low as 30s. Sotalol was dc'ed given bradycardia and concern for AV warren blockade. Her home dose of methimazole 5 mg daily was also held given pt arrived with elevated TSH (6.17) and low T4 (4.3). Her methimazole was eventually resumed at reduced dose of 2.5 mg daily given normalization of her free T4 and TSH.     Throughout her hospitalization, pt did not have any further episodes of syncope after the sotalol was held. EP was following. TTE revealed severe TR, MR, severely dilated LA, normal LV systolic function, reduced RV systolic function, severe pulmonary HTN. We reached out to the pt's outpatient cardiologist, Dr. Sami Ramires, who was aware of the severe MR. On 1/3, EP placed a micra leadless pacemaker. The following day, there were no signs of bleeding or infection from the R femoral access site. She should follow up closely with Dr. Fofana to check on her PM. She will be continued on sotalol 80 mg bid on discharge. She should also follow up with her primary cardiologist Dr. Ramires within 1 week of discharge, and her PCP within 1 week of discharge. She should also follow up with endocrinology in 4-6 weeks of discharge to re-check her TFTs and redose her methimazole accordingly.

## 2023-01-02 NOTE — DISCHARGE NOTE PROVIDER - NSDCMRMEDTOKEN_GEN_ALL_CORE_FT
ascorbic acid 500 mg oral tablet: 1 tab(s) orally 2 times a day  Aspirin Enteric Coated 81 mg oral delayed release tablet: 1 tab(s) orally once a day  ClearLax oral powder for reconstitution: 17 gram(s) orally 2 times a day  furosemide 20 mg oral tablet: 1 tab(s) orally once a day  methIMAzole 5 mg oral tablet: 1 tab(s) orally once a day  Multiple Vitamins oral tablet: 1 tab(s) orally once a day  pravastatin 20 mg oral tablet: 1 tab(s) orally once a day  rivaroxaban 15 mg oral tablet: 1 tab(s) orally once a day (before a meal)  sotalol 120 mg oral tablet: 1 tab(s) orally once a day   ascorbic acid 500 mg oral tablet: 1 tab(s) orally 2 times a day  Aspirin Enteric Coated 81 mg oral delayed release tablet: 1 tab(s) orally once a day  Betapace 80 mg oral tablet: 1 tab(s) orally every 12 hours  ClearLax oral powder for reconstitution: 17 gram(s) orally 2 times a day  methIMAzole: 2.5 milligram(s) orally once a day  Multiple Vitamins oral tablet: 1 tab(s) orally once a day  pravastatin 20 mg oral tablet: 1 tab(s) orally once a day  rivaroxaban 15 mg oral tablet: 1 tab(s) orally once a day (before a meal)

## 2023-01-02 NOTE — DISCHARGE NOTE PROVIDER - NSDCFUSCHEDAPPT_GEN_ALL_CORE_FT
[facility] Physician [facility] [phone]  Scheduled Appointment: 01/19/2023

## 2023-01-02 NOTE — PROGRESS NOTE ADULT - ASSESSMENT
87-year-old female past medical history of A. fib (on aspirin and Xarelto) on Sotalol, cholecystectomy, hemorrhoids, colitis, recent admission for bloody bowel movement and sepsis secondary to influenza, presents with syncopal episode i/s/o situational syncope vs cardiac syncope. Tele with slow afib with frequent ventricular pauses that persist despite discontinuation of sotalol. The patient is pending telemetry evaluation with ambulation to assess for persistent arrythmia despite adrenergic surge and for the necessity of pacemaker placement.  87-year-old female past medical history of A. fib (on aspirin and Xarelto) on Sotalol, cholecystectomy, hemorrhoids, colitis, recent admission for bloody bowel movement and sepsis secondary to influenza, presents with syncopal episode likely cardiac in etiology. Tele with slow afib with frequent ventricular pauses that persist despite discontinuation of sotalol. The patient is pending telemetry evaluation with ambulation to assess for persistent arrythmia despite adrenergic surge and for the necessity of pacemaker placement.

## 2023-01-02 NOTE — DISCHARGE NOTE PROVIDER - NSFOLLOWUPCLINICS_GEN_ALL_ED_FT
[facility] Endocrinology  Endocrinology  [address] [place] [address]  Phone: [phone]  Fax:   Follow Up Time: 2 months

## 2023-01-02 NOTE — PROGRESS NOTE ADULT - SUBJECTIVE AND OBJECTIVE BOX
DATE OF SERVICE: 01-02-23 @ 07:02    Patient is a 87y old  Female who presents with a chief complaint of Syncope (01 Jan 2023 06:22)      SUBJECTIVE / OVERNIGHT EVENTS:  Overnight, no events.   Pt seen and examined at bedside.    ROS negative except as above.    MEDICATIONS  (STANDING):  albuterol/ipratropium for Nebulization 3 milliLiter(s) Nebulizer every 6 hours  artificial tears (preservative free) Ophthalmic Solution 1 Drop(s) Both EYES two times a day  ascorbic acid 500 milliGRAM(s) Oral daily  aspirin enteric coated 81 milliGRAM(s) Oral daily  benzonatate 100 milliGRAM(s) Oral every 8 hours  collagenase Ointment 1 Application(s) Topical daily  methimazole 2.5 milliGRAM(s) Oral daily  multivitamin 1 Tablet(s) Oral daily  polyethylene glycol 3350 17 Gram(s) Oral two times a day  rivaroxaban 15 milliGRAM(s) Oral with dinner  senna 2 Tablet(s) Oral at bedtime    MEDICATIONS  (PRN):      Vital Signs Last 24 Hrs  T(C): 36.4 (02 Jan 2023 05:27), Max: 36.6 (01 Jan 2023 12:27)  T(F): 97.5 (02 Jan 2023 05:27), Max: 97.8 (01 Jan 2023 12:27)  HR: 87 (02 Jan 2023 05:27) (76 - 87)  BP: 113/75 (02 Jan 2023 05:27) (106/74 - 125/58)  BP(mean): --  RR: 17 (02 Jan 2023 05:27) (17 - 18)  SpO2: 100% (02 Jan 2023 05:27) (98% - 100%)    Parameters below as of 02 Jan 2023 05:27  Patient On (Oxygen Delivery Method): room air      CAPILLARY BLOOD GLUCOSE        I&O's Summary      PHYSICAL EXAM:  GENERAL: NAD, well-developed  HEAD:  Atraumatic, Normocephalic  EYES: EOMI, PERRLA, conjunctiva and sclera clear  NECK: Supple, No JVD  CHEST/LUNG: Clear to auscultation bilaterally; No wheeze  HEART: Regular rate and rhythm; No murmurs, rubs, or gallops  ABDOMEN: Soft, Nontender, Nondistended; Bowel sounds present  EXTREMITIES:  2+ Peripheral Pulses, No clubbing, cyanosis, or edema  PSYCH: AAOx3  NEUROLOGY: non-focal  SKIN: No rashes or lesions    LABS:                        9.0    8.19  )-----------( 105      ( 02 Jan 2023 06:10 )             30.1     01-01    141  |  111<H>  |  12  ----------------------------<  124<H>  5.4<H>   |  24  |  0.49<L>    Ca    7.5<L>      01 Jan 2023 20:21  Phos  2.5     01-01  Mg     2.00     01-01                MICRO:      RADIOLOGY & ADDITIONAL TESTS:      Consultant(s) Notes Reviewed:         DATE OF SERVICE: 01-02-23 @ 07:02    Patient is a 87y old  Female who presents with a chief complaint of Syncope (01 Jan 2023 06:22)      SUBJECTIVE / OVERNIGHT EVENTS:  Overnight review of tele revealing Afib/flutter rates between , non-sustained at 180, average HR 60-80s. No pauses overnight.   Pt seen and examined at bedside. Could not be interviewed at this time given inability to hear Spanish .     ROS could not be completed at this time.     MEDICATIONS  (STANDING):  albuterol/ipratropium for Nebulization 3 milliLiter(s) Nebulizer every 6 hours  artificial tears (preservative free) Ophthalmic Solution 1 Drop(s) Both EYES two times a day  ascorbic acid 500 milliGRAM(s) Oral daily  aspirin enteric coated 81 milliGRAM(s) Oral daily  benzonatate 100 milliGRAM(s) Oral every 8 hours  collagenase Ointment 1 Application(s) Topical daily  methimazole 2.5 milliGRAM(s) Oral daily  multivitamin 1 Tablet(s) Oral daily  polyethylene glycol 3350 17 Gram(s) Oral two times a day  rivaroxaban 15 milliGRAM(s) Oral with dinner  senna 2 Tablet(s) Oral at bedtime    MEDICATIONS  (PRN):      Vital Signs Last 24 Hrs  T(C): 36.4 (02 Jan 2023 05:27), Max: 36.6 (01 Jan 2023 12:27)  T(F): 97.5 (02 Jan 2023 05:27), Max: 97.8 (01 Jan 2023 12:27)  HR: 87 (02 Jan 2023 05:27) (76 - 87)  BP: 113/75 (02 Jan 2023 05:27) (106/74 - 125/58)  BP(mean): --  RR: 17 (02 Jan 2023 05:27) (17 - 18)  SpO2: 100% (02 Jan 2023 05:27) (98% - 100%)    Parameters below as of 02 Jan 2023 05:27  Patient On (Oxygen Delivery Method): room air      CAPILLARY BLOOD GLUCOSE        I&O's Summary      PHYSICAL EXAM:  GENERAL: frail, elderly appearing, demented  HEAD:  Atraumatic, Normocephalic  EYES: EOMI, PERRLA, conjunctiva and sclera clear  NECK: Supple, No JVD  CHEST/LUNG: Clear to auscultation bilaterally; No wheeze  HEART: Regular rate and rhythm; No murmurs, rubs, or gallops  ABDOMEN: mildly tender, paraumbilical hernia, soft and reducible, bowel sounds present  EXTREMITIES:  2+ Peripheral Pulses, No clubbing, cyanosis, or edema  PSYCH: AAOx2  NEUROLOGY: non-focal  SKIN: No rashes or lesions    LABS:                        9.0    8.19  )-----------( 105      ( 02 Jan 2023 06:10 )             30.1     01-01    141  |  111<H>  |  12  ----------------------------<  124<H>  5.4<H>   |  24  |  0.49<L>    Ca    7.5<L>      01 Jan 2023 20:21  Phos  2.5     01-01  Mg     2.00     01-01                MICRO:      RADIOLOGY & ADDITIONAL TESTS:      Consultant(s) Notes Reviewed:

## 2023-01-02 NOTE — DISCHARGE NOTE PROVIDER - CARE PROVIDER_API CALL
Sami Ramires  [address] [place] [address]  Phone: (   )    -  Fax: (   )    -  Follow Up Time: 1 week    Your primary care doctor,   Phone: (   )    -  Fax: (   )    -  Follow Up Time: 1 week

## 2023-01-02 NOTE — PROGRESS NOTE ADULT - ATTENDING COMMENTS
Patient seen and examined with daughter at bedside. Denies any pain or complaints at this time. Feel good. Ate most of her breakfast and lunch with daughter.  EP follow up appreciated, holding home sotalol due to concern about pause and bradycardia (will likely not be resumed upon discharge).  TTE shows normal EF but shows-severe MR, mild AS, mild-mod AI, severely dilated LA, severe TR, severe pulmonary HTN  Syncope, likely due to pauses episodes (sotalol related) and TTE findings likely also contributing to syncope-severe MR and severe TR  Team discussed with patient's cards (Dr. Ramires) who states that pt's valvular disease is already known and suggests for patient to get a PPM placed on this admission, EP consulted and planned for PPM aubrey  Patient remains on A.Fib-permanent atrial fibrillation, c/w xarelto  Elevated TSH/Free T4 on low side of normal, apprec Endocrine recs (verbal recs), methimazole dose decreased to 2.5mg QD  Hyppernatremia improved s/p D5, monitor BMP  PT evaluation --> DEISY (daughter Laura is in agreement with it)  Discussed with daughter at bedside.  Rest as above .

## 2023-01-02 NOTE — DISCHARGE NOTE PROVIDER - NSDCCPCAREPLAN_GEN_ALL_CORE_FT
PRINCIPAL DISCHARGE DIAGNOSIS  Diagnosis: Syncope  Assessment and Plan of Treatment: You were admitted to the hospital because you lost consciousness at home (syncope). We were concerned that your heart could be the source of this episode because your rhythmn strip revealed that your heart was pausing frequently, up to 1-3 seconds each time. We stopped your home medication called sotalol as this medication slows down your heart and can make these pauses last even longer. We also reached out to the electrophysiology team who recommended _________.  On discharge, you should continue to NOT take your sotalol. You should also follow up closely with Dr. Ramires and your PCP within 1 week of discharge.       PRINCIPAL DISCHARGE DIAGNOSIS  Diagnosis: Syncope  Assessment and Plan of Treatment: You were admitted to the hospital because you lost consciousness at home (syncope). We were concerned that your heart could be the source of this episode because your rhythmn strip revealed that your heart was pausing frequently, up to 1-3 seconds each time. We stopped your home medication called sotalol as this medication slows down your heart and can make these pauses last even longer. We also reached out to the electrophysiology team who recommended placement of a pacemaker into your heart. A leadless pacemaker was placed on 1/3 without complication.  On discharge, you should resume your sotalol at a reduced dose of 80 mg daily. You should also follow up closely with Dr. Ramires and your PCP within 1 week of discharge. You should also follow up with Dr. Fofana on 1/19 to check on your pacemaker.       PRINCIPAL DISCHARGE DIAGNOSIS  Diagnosis: Syncope  Assessment and Plan of Treatment: You were admitted to the hospital because you lost consciousness at home (syncope). We were concerned that your heart could be the source of this episode because your rhythmn strip revealed that your heart was pausing frequently, up to 1-3 seconds each time. We stopped your home medication called sotalol as this medication slows down your heart and can make these pauses last even longer. We also reached out to the electrophysiology team who recommended placement of a pacemaker into your heart. A leadless pacemaker was placed on 1/3 without complication.  On discharge, you should resume your sotalol at a new dose of 80 mg twice a day. You should also follow up closely with Dr. Ramires and your PCP within 1 week of discharge. You should also follow up with Dr. Fofana on 1/19 to check on your pacemaker.      SECONDARY DISCHARGE DIAGNOSES  Diagnosis: Hyperthyroidism  Assessment and Plan of Treatment: You normally take a medication for your hyperthyroidism called methimazole, which is a medication that normally reduced thyroid function. You were taking 5 mg daily. When you first got to the hospital, your thyroid function was low, probably because the methimazole dose was too high for you. This low thyroid function may have also contributed to your slow heart rate, so we did not give you your methimazole for a few days. We spoke with the endocrinologists (hormone doctors) who recommended restarting your methimazole at a reduced dose of 2.5mg daily. You should continue this dose on discharge and follow up with an endocrinologist 4-6 weeks after discharge to re-check your thyroid function tests and re-dose this medication appropriately.     PRINCIPAL DISCHARGE DIAGNOSIS  Diagnosis: Syncope  Assessment and Plan of Treatment: You were admitted to the hospital because you lost consciousness at home (syncope). We were concerned that your heart could be the source of this episode because your rhythmn strip revealed that your heart was pausing frequently, up to 1-3 seconds each time. We stopped your home medication called sotalol as this medication slows down your heart and can make these pauses last even longer. We also reached out to the electrophysiology team who recommended placement of a pacemaker into your heart. A leadless pacemaker was placed on 1/3 without complication.  On discharge, you should resume your sotalol at a new dose of 80 mg twice a day. You should also follow up closely with Dr. Ramires and your PCP within 1 week of discharge. You should also follow up with Dr. Fofana (electrophyiologist who placed your pacemaker) on 1/19 to check on your pacemaker. His office's information is listed in your discharge paperwork.      SECONDARY DISCHARGE DIAGNOSES  Diagnosis: Hyperthyroidism  Assessment and Plan of Treatment: You normally take a medication for your hyperthyroidism called methimazole, which is a medication that normally reduced thyroid function. You were taking 5 mg daily. When you first got to the hospital, your thyroid function was low, probably because the methimazole dose was too high for you. This low thyroid function may have also contributed to your slow heart rate, so we did not give you your methimazole for a few days. We spoke with the endocrinologists (hormone doctors) who recommended restarting your methimazole at a reduced dose of 2.5mg daily. You should continue this dose on discharge and follow up with an endocrinologist 4-6 weeks after discharge to re-check your thyroid function tests and re-dose this medication appropriately.

## 2023-01-03 NOTE — PROGRESS NOTE ADULT - SUBJECTIVE AND OBJECTIVE BOX
DATE OF SERVICE: 01-03-23 @ 06:55    Patient is a 87y old  Female who presents with a chief complaint of Syncope (02 Jan 2023 10:17)      SUBJECTIVE / OVERNIGHT EVENTS:  Overnight, no events. On tele _____. EP Planning for PM today.   Pt seen and examined at bedside.    ROS negative except as above.    MEDICATIONS  (STANDING):  albuterol/ipratropium for Nebulization 3 milliLiter(s) Nebulizer every 6 hours  artificial tears (preservative free) Ophthalmic Solution 1 Drop(s) Both EYES two times a day  ascorbic acid 500 milliGRAM(s) Oral daily  aspirin enteric coated 81 milliGRAM(s) Oral daily  benzonatate 100 milliGRAM(s) Oral every 8 hours  collagenase Ointment 1 Application(s) Topical daily  methimazole 2.5 milliGRAM(s) Oral daily  multivitamin 1 Tablet(s) Oral daily  polyethylene glycol 3350 17 Gram(s) Oral two times a day  rivaroxaban 15 milliGRAM(s) Oral with dinner  senna 2 Tablet(s) Oral at bedtime    MEDICATIONS  (PRN):      Vital Signs Last 24 Hrs  T(C): 36.3 (03 Jan 2023 05:18), Max: 36.4 (02 Jan 2023 21:10)  T(F): 97.3 (03 Jan 2023 05:18), Max: 97.5 (02 Jan 2023 21:10)  HR: 85 (03 Jan 2023 05:18) (78 - 102)  BP: 116/59 (03 Jan 2023 05:18) (116/59 - 140/73)  BP(mean): --  RR: 17 (03 Jan 2023 05:18) (17 - 18)  SpO2: 97% (03 Jan 2023 05:18) (97% - 100%)    Parameters below as of 03 Jan 2023 05:18  Patient On (Oxygen Delivery Method): room air      CAPILLARY BLOOD GLUCOSE      POCT Blood Glucose.: 99 mg/dL (03 Jan 2023 06:48)  POCT Blood Glucose.: 103 mg/dL (02 Jan 2023 12:31)    I&O's Summary      PHYSICAL EXAM:  GENERAL: NAD, well-developed  HEAD:  Atraumatic, Normocephalic  EYES: EOMI, PERRLA, conjunctiva and sclera clear  NECK: Supple, No JVD  CHEST/LUNG: Clear to auscultation bilaterally; No wheeze  HEART: Regular rate and rhythm; No murmurs, rubs, or gallops  ABDOMEN: Soft, Nontender, Nondistended; Bowel sounds present  EXTREMITIES:  2+ Peripheral Pulses, No clubbing, cyanosis, or edema  PSYCH: AAOx3  NEUROLOGY: non-focal  SKIN: No rashes or lesions    LABS:                        9.0    8.19  )-----------( 105      ( 02 Jan 2023 06:10 )             30.1     01-02    140  |  111<H>  |  10  ----------------------------<  97  4.9   |  26  |  0.46<L>    Ca    7.7<L>      02 Jan 2023 06:10  Phos  2.3     01-02  Mg     1.90     01-02                MICRO:      RADIOLOGY & ADDITIONAL TESTS:      Consultant(s) Notes Reviewed:         DATE OF SERVICE: 01-03-23 @ 06:55    Patient is a 87y old  Female who presents with a chief complaint of Syncope (02 Jan 2023 10:17)      SUBJECTIVE / OVERNIGHT EVENTS:  Overnight, no events. On tele with persistent Afib/flutter, Afib with RVR to 180 however unsustained, lasted few seconds. Average HR between 60-80 overnight. EP Planning for PM today.   Pt seen and examined at bedside. Slightly more conversational today. Still unable to hear Bengali .     ROS could not be completed at this time.     MEDICATIONS  (STANDING):  albuterol/ipratropium for Nebulization 3 milliLiter(s) Nebulizer every 6 hours  artificial tears (preservative free) Ophthalmic Solution 1 Drop(s) Both EYES two times a day  ascorbic acid 500 milliGRAM(s) Oral daily  aspirin enteric coated 81 milliGRAM(s) Oral daily  benzonatate 100 milliGRAM(s) Oral every 8 hours  collagenase Ointment 1 Application(s) Topical daily  methimazole 2.5 milliGRAM(s) Oral daily  multivitamin 1 Tablet(s) Oral daily  polyethylene glycol 3350 17 Gram(s) Oral two times a day  rivaroxaban 15 milliGRAM(s) Oral with dinner  senna 2 Tablet(s) Oral at bedtime    MEDICATIONS  (PRN):      Vital Signs Last 24 Hrs  T(C): 36.3 (03 Jan 2023 05:18), Max: 36.4 (02 Jan 2023 21:10)  T(F): 97.3 (03 Jan 2023 05:18), Max: 97.5 (02 Jan 2023 21:10)  HR: 85 (03 Jan 2023 05:18) (78 - 102)  BP: 116/59 (03 Jan 2023 05:18) (116/59 - 140/73)  BP(mean): --  RR: 17 (03 Jan 2023 05:18) (17 - 18)  SpO2: 97% (03 Jan 2023 05:18) (97% - 100%)    Parameters below as of 03 Jan 2023 05:18  Patient On (Oxygen Delivery Method): room air      CAPILLARY BLOOD GLUCOSE      POCT Blood Glucose.: 99 mg/dL (03 Jan 2023 06:48)  POCT Blood Glucose.: 103 mg/dL (02 Jan 2023 12:31)    I&O's Summary      PHYSICAL EXAM:  GENERAL: frail,   HEAD:  Atraumatic, Normocephalic  EYES: EOMI, PERRLA, conjunctiva and sclera clear  NECK: Supple, No JVD  CHEST/LUNG: Clear to auscultation bilaterally; No wheeze  HEART: Regular rate and rhythm; No murmurs, rubs, or gallops  ABDOMEN: Soft, Nontender, Nondistended; Bowel sounds present  EXTREMITIES:  2+ Peripheral Pulses, No clubbing, cyanosis, or edema  PSYCH: AAOx3  NEUROLOGY: non-focal  SKIN: No rashes or lesions    LABS:                        9.0    8.19  )-----------( 105      ( 02 Jan 2023 06:10 )             30.1     01-02    140  |  111<H>  |  10  ----------------------------<  97  4.9   |  26  |  0.46<L>    Ca    7.7<L>      02 Jan 2023 06:10  Phos  2.3     01-02  Mg     1.90     01-02                MICRO:      RADIOLOGY & ADDITIONAL TESTS:      Consultant(s) Notes Reviewed:         DATE OF SERVICE: 01-03-23 @ 06:55    Patient is a 87y old  Female who presents with a chief complaint of Syncope (02 Jan 2023 10:17)      SUBJECTIVE / OVERNIGHT EVENTS:  Overnight, no events. On tele with persistent Afib/flutter, Afib with RVR to 180 however unsustained, lasted few seconds. Average HR between 60-80 overnight. EP Planning for PM today.   Pt seen and examined at bedside. Slightly more conversational today. Still unable to hear Kyrgyz .     ROS could not be completed at this time.     MEDICATIONS  (STANDING):  albuterol/ipratropium for Nebulization 3 milliLiter(s) Nebulizer every 6 hours  artificial tears (preservative free) Ophthalmic Solution 1 Drop(s) Both EYES two times a day  ascorbic acid 500 milliGRAM(s) Oral daily  aspirin enteric coated 81 milliGRAM(s) Oral daily  benzonatate 100 milliGRAM(s) Oral every 8 hours  collagenase Ointment 1 Application(s) Topical daily  methimazole 2.5 milliGRAM(s) Oral daily  multivitamin 1 Tablet(s) Oral daily  polyethylene glycol 3350 17 Gram(s) Oral two times a day  rivaroxaban 15 milliGRAM(s) Oral with dinner  senna 2 Tablet(s) Oral at bedtime    MEDICATIONS  (PRN):      Vital Signs Last 24 Hrs  T(C): 36.3 (03 Jan 2023 05:18), Max: 36.4 (02 Jan 2023 21:10)  T(F): 97.3 (03 Jan 2023 05:18), Max: 97.5 (02 Jan 2023 21:10)  HR: 85 (03 Jan 2023 05:18) (78 - 102)  BP: 116/59 (03 Jan 2023 05:18) (116/59 - 140/73)  BP(mean): --  RR: 17 (03 Jan 2023 05:18) (17 - 18)  SpO2: 97% (03 Jan 2023 05:18) (97% - 100%)    Parameters below as of 03 Jan 2023 05:18  Patient On (Oxygen Delivery Method): room air      CAPILLARY BLOOD GLUCOSE      POCT Blood Glucose.: 99 mg/dL (03 Jan 2023 06:48)  POCT Blood Glucose.: 103 mg/dL (02 Jan 2023 12:31)    I&O's Summary      PHYSICAL EXAM:  GENERAL: frail, elderly, demented woman, NAD  HEAD:  Atraumatic, Normocephalic  EYES: EOMI, PERRLA, conjunctiva and sclera clear  NECK: Supple, No JVD  CHEST/LUNG: Clear to auscultation bilaterally; No wheeze  HEART: Regular rate and rhythm; No murmurs, rubs, or gallops  ABDOMEN: Soft, Nontender, Nondistended; Bowel sounds present, paraumbilical hernia noted, soft, reducible, nontender  EXTREMITIES:  2+ Peripheral Pulses, No clubbing, cyanosis, or edema  PSYCH: AAOx3  NEUROLOGY: non-focal  SKIN: No rashes or lesions    LABS:                        9.0    8.19  )-----------( 105      ( 02 Jan 2023 06:10 )             30.1     01-02    140  |  111<H>  |  10  ----------------------------<  97  4.9   |  26  |  0.46<L>    Ca    7.7<L>      02 Jan 2023 06:10  Phos  2.3     01-02  Mg     1.90     01-02                MICRO:      RADIOLOGY & ADDITIONAL TESTS:      Consultant(s) Notes Reviewed:

## 2023-01-03 NOTE — PROGRESS NOTE ADULT - ATTENDING COMMENTS
Seen and examined by me this afternoon while in recovery room, s/p leadless PPM placement. Some pain, otherwise doing well.  EP follow up appreciated, continue to hold sotalol, s/p micro PPM, resume xarelto tomorrow afternoon  TTE shows normal EF but shows-severe MR, mild AS, mild-mod AI, severely dilated LA, severe TR, severe pulmonary HTN  Syncope, likely due to pauses episodes (sotalol related) and TTE findings likely also contributing to syncope-severe MR and severe TR  Patient remains on A.Fib-permanent atrial fibrillation, resume xarelto as above  Elevated TSH/Free T4 on low side of normal, apprec Endocrine recs (verbal recs), c/w low dose of methimazole 2.5mg QD  Hypernatremia resolved  PT evaluation --> DEISY (daughter Laura is in agreement with it), proceeding with DC plan at this time  Rest as above

## 2023-01-03 NOTE — PROGRESS NOTE ADULT - ASSESSMENT
87-year-old female past medical history of A. fib (on aspirin and Xarelto) on Sotalol, cholecystectomy, hemorrhoids, colitis, recent admission for bloody bowel movement and sepsis secondary to influenza, presents with syncopal episode likely cardiac in etiology. Tele with slow afib with frequent ventricular pauses that persist despite discontinuation of sotalol. The patient is pending telemetry evaluation with ambulation to assess for persistent arrythmia despite adrenergic surge and for the necessity of pacemaker placement.

## 2023-01-03 NOTE — PROGRESS NOTE ADULT - ASSESSMENT
86 yo F with a PMH of AF on Xarelto & Sotalol, colitis, DVT, mult wounds to foot and sacrum w/ recent admission for bloody BM a/w sepsis; found to be +for influenza then readmitted on 12/27/2022 for syncope likely in the setting of pauses, hosp course c/b by hyperkalemia, LLE vascular concerns vs cellulitis, transaminitis & hyperthyroidism. Dx'd w/ TBS, now NPO and consented for leadless Micra, CHA2 DS2 Vasc, 3:    - keep NPO for implant of Micra  - will likely resume Sotalol post implant  - cont full dose AC Xarelto 15 QHS  - continue telemetry  - keep K+ 4.0 - 4.5 and Mg 2.0 - 2.5  - d/w EP attending 86 yo F with a PMH of AF on Xarelto & Sotalol, colitis, DVT, mult wounds to foot and sacrum w/ recent admission for bloody BM a/w sepsis; found to be +for influenza then readmitted on 12/27/2022 for syncope likely in the setting of pauses, hosp course c/b by hyperkalemia, LLE vascular concerns vs cellulitis, transaminitis & hyperthyroidism. Dx'd w/ TBS, now NPO and consented for leadless Micra, CHA2 DS2 Vasc, 3:    - keep NPO for implant of Micra  - will likely resume Sotalol post implant  - cont full dose AC w/ Xarelto 15 QHS as CrCl < 51  - continue telemetry  - keep K+ 4.0 - 4.5 and Mg 2.0 - 2.5  - d/w EP attending

## 2023-01-03 NOTE — PROGRESS NOTE ADULT - PROBLEM SELECTOR PLAN 1
Pt presenting with syncopal episode i/s/o earing suggestive of situational syncope/vasovagal i/s/o swallowing. Tele with slow Afib with frequent episodes of ventricular pauses (longest pause 3.1 seconds though asymptomatic, stable BP) raising concern for cardiac syncope. tsh only slightly elevated, so hypothyroidism less likely to be contributory; no evident electrolyte derangements to explain arrhythmia   TTE with severe TR, MR. Severely dilated LA. Normal LV systolic function. Reduced RV systolic function. Severe pulmonary HTN--per cardiology, valvulopathy is chronic and does not explain syncope   - EP following, appreciate recs  - Monitor on Tele  - Hold sotalol  - replete lytes as needed  - EP recommending documenting heart rate with ambulation; if HR increases with ambulation this would demonstrate an adequate response and less likely to need an EP intervention, however if HR fails to raise with ambulation would likely need ablation vs. pacemaker. will have PT try to ambulate pt Pt presenting with syncopal episode i/s/o earing suggestive of situational syncope/vasovagal i/s/o swallowing. Tele with slow Afib with frequent episodes of ventricular pauses (longest pause 3.1 seconds though asymptomatic, stable BP) raising concern for cardiac syncope. tsh only slightly elevated, so hypothyroidism less likely to be contributory; no evident electrolyte derangements to explain arrhythmia   TTE with severe TR, MR. Severely dilated LA. Normal LV systolic function. Reduced RV systolic function. Severe pulmonary HTN--per cardiology, valvulopathy is chronic and does not explain syncope   - EP following, appreciate recs  - Monitor on Tele  - Hold sotalol  - replete lytes as needed  - EP planning for PPM 1/3

## 2023-01-03 NOTE — PROGRESS NOTE ADULT - SUBJECTIVE AND OBJECTIVE BOX
Patient seen and evaluated today.  No significant events overnight. Telemetry review demonstrates AF HR: 85 (01-03-23 @ 05:18) (78 - 102).    MEDICATIONS:  albuterol/ipratropium for Nebulization 3 milliLiter(s) Nebulizer every 6 hours  artificial tears (preservative free) Ophthalmic Solution 1 Drop(s) Both EYES two times a day  ascorbic acid 500 milliGRAM(s) Oral daily  aspirin enteric coated 81 milliGRAM(s) Oral daily  benzonatate 100 milliGRAM(s) Oral every 8 hours  collagenase Ointment 1 Application(s) Topical daily  methimazole 2.5 milliGRAM(s) Oral daily  multivitamin 1 Tablet(s) Oral daily  polyethylene glycol 3350 17 Gram(s) Oral two times a day  rivaroxaban 15 milliGRAM(s) Oral with dinner  senna 2 Tablet(s) Oral at bedtime      REVIEW OF SYSTEMS:  CONSTITUTIONAL: No fever, weight loss, or fatigue  NECK: No pain or stiffness  RESPIRATORY: No cough, wheezing, chills or hemoptysis; No Shortness of Breath  CARDIOVASCULAR: No chest pain, palpitations, passing out, dizziness, or leg swelling  GASTROINTESTINAL: No abdominal or epigastric pain. No nausea, vomiting, or hematemesis; No diarrhea or constipation. No melena or hematochezia.  NEUROLOGICAL: No headaches, memory loss, loss of strength, numbness, or tremors  SKIN: No itching, burning, rashes, or lesions   PSYCHIATRIC: No depression, anxiety, mood swings, or difficulty sleeping  HEME/LYMPH: No easy bruising, or bleeding gums  ALLERGY AND IMMUNOLOGIC: No hives or eczema	  All others negative    PHYSICAL EXAM:  T(C): 36.3 (01-03-23 @ 05:18), Max: 36.4 (01-02-23 @ 21:10)  HR: 85 (01-03-23 @ 05:18) (78 - 102)  BP: 116/59 (01-03-23 @ 05:18) (116/59 - 140/73)  RR: 17 (01-03-23 @ 05:18) (17 - 18)  SpO2: 97% (01-03-23 @ 05:18) (97% - 100%)  Wt(kg): --  I&O's Summary     Appearance: Normal	  HEENT:   Normal oral mucosa, PERRL, EOMI	  Lymphatic: No lymphadenopathy  Cardiovascular: Normal S1 S2, No JVD, No murmurs, No edema  Respiratory: Lungs clear to auscultation	  Psychiatry: A & O x2- 3, Mood & affect appropriate  Gastrointestinal:  Soft, Non-tender, + BS	  Skin: No rashes, No ecchymoses, No cyanosis	  Neurologic: Non-focal  Extremities: Normal range of motion, No clubbing, cyanosis or edema  Vascular: Peripheral pulses palpable 2+ bilaterally    DIAGNOSTICS:  TELEMETRY: 	  as above    LABS:	 	                          9.1    7.51  )-----------( 110      ( 03 Jan 2023 06:17 )             30.2     01-03    140  |  108<H>  |  11  ----------------------------<  92  4.4   |  27  |  0.50    Ca    7.6<L>      03 Jan 2023 06:17  Phos  2.5     01-03  Mg     2.40     01-03    proBNP: Serum Pro-Brain Natriuretic Peptide: 3725 pg/mL (12-09 @ 00:50)  Serum Pro-Brain Natriuretic Peptide: 2238 pg/mL (11-24 @ 22:14)    HR: 85 (01-03-23 @ 05:18) (78 - 102

## 2023-01-03 NOTE — PROGRESS NOTE ADULT - PROBLEM SELECTOR PLAN 5
Pt with hx of hemorrhoidal bleeding, On miralax at home  - c/w with miralax. Will have dulcolax PRN available DVT: Xarelto  Diet: minced and moist  Dispo: PT recommending rehab, to be discussed with daughter and to be reassessed pending evaluation of need for permanent pacemaker placement

## 2023-01-04 NOTE — PROGRESS NOTE ADULT - PROBLEM SELECTOR PLAN 3
Initially concerned for arterial occlusion in R foot given blue appearance and concern for cellulitis in L foot given erythema and swelling. On re-evaluation, both feet appear symmetrically erythematous, with well demarcated line above level of malleolus. On further interview with daughter, pt's feet change colors for years now, and is related to position. Redness improved with elevation of the leg. All this is suggestive of a more chronic disease process, likely chronic venous insufficiency +/- Raynaud phenomenon     - initial concern for arterial occlusion of R foot given cold blue foot. CTA with aortic runoff to b/l feet with no arterial occlusion in LE. On reassessment, foot no longer blue, now red   - initial concern for cellulitis of L foot however now less likely given above reasoning, will dc cefazolin and monitor off abx, if clinically worsening, febrile, or uptrending leukocytosis, will consider restarting abx Initially concerned for arterial occlusion in R foot given blue appearance and concern for cellulitis in L foot given erythema and swelling. Redness improved with elevation of the leg. All this is suggestive of a more chronic disease process, likely chronic venous insufficiency +/- Raynaud phenomenon     - initial concern for arterial occlusion of R foot given cold blue foot. CTA with aortic runoff to b/l feet with no arterial occlusion in LE. On reassessment, foot no longer blue, now red   - initial concern for cellulitis of L foot however now less likely given above reasoning, will dc cefazolin and monitor off abx, if clinically worsening, febrile, or uptrending leukocytosis, will consider restarting abx

## 2023-01-04 NOTE — PROVIDER CONTACT NOTE (OTHER) - ACTION/TREATMENT ORDERED:
MD aware & assessed patient & reviewed orders, lab, history, plan, heart rate & rhythm on the monitor. MD ordered to place R2 pads on patient. R2 pads placed on patient. Electrophysiology Team consult
Provider aware. Try to give Xarelto, can hold robitussin and miralax for now.
Provider made aware via teams.
MD aware & assessed patient & reviewed orders, lab, history, plan, heart rate & rhythm on the monitor. Electrophysiology Team consulted.
Notify provider if patient becomes symptomatic
Provider notified will continue to monitor.
Provider notified, holding sotalol as per orders
Provider notified, to come to bedside to assess patient
Provider notified will continue to monitor

## 2023-01-04 NOTE — PROGRESS NOTE ADULT - SUBJECTIVE AND OBJECTIVE BOX
DATE OF SERVICE: 01-04-23 @ 06:59    Patient is a 87y old  Female who presents with a chief complaint of Syncope (03 Jan 2023 10:33)      SUBJECTIVE / OVERNIGHT EVENTS:  Overnight, with serosanguinous discharge from PPM access site. Sotalol held as SBP was in 100s.   Pt seen and examined at bedside.    ROS negative except as above.    MEDICATIONS  (STANDING):  artificial tears (preservative free) Ophthalmic Solution 1 Drop(s) Both EYES two times a day  ascorbic acid 500 milliGRAM(s) Oral daily  aspirin enteric coated 81 milliGRAM(s) Oral daily  benzonatate 100 milliGRAM(s) Oral every 8 hours  collagenase Ointment 1 Application(s) Topical daily  methimazole 2.5 milliGRAM(s) Oral daily  multivitamin 1 Tablet(s) Oral daily  polyethylene glycol 3350 17 Gram(s) Oral two times a day  rivaroxaban 15 milliGRAM(s) Oral with dinner  senna 2 Tablet(s) Oral at bedtime  sotalol. 80 milliGRAM(s) Oral <User Schedule>    MEDICATIONS  (PRN):      Vital Signs Last 24 Hrs  T(C): 36 (04 Jan 2023 06:00), Max: 36.8 (03 Jan 2023 18:17)  T(F): 96.8 (04 Jan 2023 06:00), Max: 98.3 (03 Jan 2023 18:17)  HR: 91 (04 Jan 2023 06:00) (78 - 91)  BP: 120/68 (04 Jan 2023 06:00) (89/40 - 125/57)  BP(mean): --  RR: 17 (04 Jan 2023 06:00) (17 - 18)  SpO2: 97% (04 Jan 2023 06:00) (97% - 100%)    Parameters below as of 04 Jan 2023 06:00  Patient On (Oxygen Delivery Method): room air      CAPILLARY BLOOD GLUCOSE        I&O's Summary      PHYSICAL EXAM:  GENERAL: NAD, well-developed  HEAD:  Atraumatic, Normocephalic  EYES: EOMI, PERRLA, conjunctiva and sclera clear  NECK: Supple, No JVD  CHEST/LUNG: Clear to auscultation bilaterally; No wheeze  HEART: Regular rate and rhythm; No murmurs, rubs, or gallops  ABDOMEN: Soft, Nontender, Nondistended; Bowel sounds present  EXTREMITIES:  2+ Peripheral Pulses, No clubbing, cyanosis, or edema  PSYCH: AAOx3  NEUROLOGY: non-focal  SKIN: No rashes or lesions    LABS:                        9.1    7.51  )-----------( 110      ( 03 Jan 2023 06:17 )             30.2     01-03    140  |  108<H>  |  11  ----------------------------<  92  4.4   |  27  |  0.50    Ca    7.6<L>      03 Jan 2023 06:17  Phos  2.5     01-03  Mg     2.40     01-03      PT/INR - ( 03 Jan 2023 06:17 )   PT: 31.1 sec;   INR: 2.65 ratio         PTT - ( 03 Jan 2023 06:17 )  PTT:38.0 sec          MICRO:      RADIOLOGY & ADDITIONAL TESTS:      Consultant(s) Notes Reviewed:         DATE OF SERVICE: 01-04-23 @ 06:59    Patient is a 87y old  Female who presents with a chief complaint of Syncope (03 Jan 2023 10:33)      SUBJECTIVE / OVERNIGHT EVENTS:  Overnight, with serosanguinous discharge from PPM access site. Sotalol held as SBP was in 100s.   Pt seen and examined at bedside. Pt said "good morning doctor" in Romanian today, more talkative. Still unable to meaningfully participate in interview.     ROS could not be completed at this time.     MEDICATIONS  (STANDING):  artificial tears (preservative free) Ophthalmic Solution 1 Drop(s) Both EYES two times a day  ascorbic acid 500 milliGRAM(s) Oral daily  aspirin enteric coated 81 milliGRAM(s) Oral daily  benzonatate 100 milliGRAM(s) Oral every 8 hours  collagenase Ointment 1 Application(s) Topical daily  methimazole 2.5 milliGRAM(s) Oral daily  multivitamin 1 Tablet(s) Oral daily  polyethylene glycol 3350 17 Gram(s) Oral two times a day  rivaroxaban 15 milliGRAM(s) Oral with dinner  senna 2 Tablet(s) Oral at bedtime  sotalol. 80 milliGRAM(s) Oral <User Schedule>    MEDICATIONS  (PRN):      Vital Signs Last 24 Hrs  T(C): 36 (04 Jan 2023 06:00), Max: 36.8 (03 Jan 2023 18:17)  T(F): 96.8 (04 Jan 2023 06:00), Max: 98.3 (03 Jan 2023 18:17)  HR: 91 (04 Jan 2023 06:00) (78 - 91)  BP: 120/68 (04 Jan 2023 06:00) (89/40 - 125/57)  BP(mean): --  RR: 17 (04 Jan 2023 06:00) (17 - 18)  SpO2: 97% (04 Jan 2023 06:00) (97% - 100%)    Parameters below as of 04 Jan 2023 06:00  Patient On (Oxygen Delivery Method): room air      CAPILLARY BLOOD GLUCOSE        I&O's Summary      PHYSICAL EXAM:  GENERAL: frail, elderly, demented woman, NAD  HEAD:  Atraumatic, Normocephalic  EYES: EOMI, PERRLA, conjunctiva and sclera clear  NECK: Supple, No JVD  CHEST/LUNG: Clear to auscultation bilaterally; No wheeze  HEART: irregularly irregular rate and rhythm; No murmurs, rubs, or gallops  ABDOMEN: Soft, Nontender, Nondistended; Bowel sounds present, paraumbilical hernia noted  EXTREMITIES:  2+ Peripheral Pulses, No clubbing, cyanosis, or edema; R femoral access site dressing with minimal serosanguinous discharge, unimpressive  PSYCH: AAOx3  NEUROLOGY: non-focal  SKIN: No rashes or lesions    LABS:                        9.1    7.51  )-----------( 110      ( 03 Jan 2023 06:17 )             30.2     01-03    140  |  108<H>  |  11  ----------------------------<  92  4.4   |  27  |  0.50    Ca    7.6<L>      03 Jan 2023 06:17  Phos  2.5     01-03  Mg     2.40     01-03      PT/INR - ( 03 Jan 2023 06:17 )   PT: 31.1 sec;   INR: 2.65 ratio         PTT - ( 03 Jan 2023 06:17 )  PTT:38.0 sec          MICRO:      RADIOLOGY & ADDITIONAL TESTS:      Consultant(s) Notes Reviewed:

## 2023-01-04 NOTE — PROVIDER CONTACT NOTE (OTHER) - ASSESSMENT
Patient asymptomatic currently resting in bed
Patient failed dysphagia screen, has wet cough. Due for robitussin, miralax, and xarelto PO. Daughter at bedside states patient eats soft foods at home.
Patient yony down to 28. RN received call from [employer]. RN assessed patient, HR back in 70s. BP 95/60. Patient awake with no complaints. Daughter at bedside.
Patient asymptomatic, currently resting in bed
Patient currently resting in bed asymptomatic /71 heart rate 91
Patient is asymptomatic. Patient is Alert and Oriented times Two. Patient denies chest pain, discomfort, shortness of breath, dizziness and lightheadedness. Patient's Heart Rate 50's - 60's & Heart Rhythm is Atrial Fibrillation on the cardiac monitor. Patient has an active DNR and DNI order. R2 pads on patient per MD order. Patient's Vital Signs: Temperature 97.2 F Axillary, Heart Rate 72, Blood Pressure 132/80, Respiratory Rate 18 and O2 Saturation 100% Room Air.
Patient's BP 94/47 HR 79, patient is currently asymptomatic and resting in bed
Patient is asymptomatic. Patient is Alert and Oriented times Two. Patient denies chest pain, discomfort, shortness of breath, dizziness and lightheadedness. Patient's Heart Rate 50's - 60's & Heart Rhythm is Atrial Fibrillation on the cardiac monitor. Patient has an active DNR and DNI order. Patient's Heart Rate 68, Blood Pressure 102/75, Respiratory Rate 18 and O2 Saturation 100% Room Air.
patient A&Ox1 to self, confused. Arouses to voice. Denies chest pain, SOB, dizziness, lightheadness

## 2023-01-04 NOTE — PROGRESS NOTE ADULT - SUBJECTIVE AND OBJECTIVE BOX
Interval history:  No acute overnight event  S/p Micra PPM and groin site clean  Tele reveals Afib at 90s      PAST MEDICAL & SURGICAL HISTORY:  Atrial fibrillation    UTI (urinary tract infection)    Hypothyroidism    Colon cancer    Hemorrhoids    HLD (hyperlipidemia)    History of cholecystectomy    Perforated appendicitis    History of cataract surgery        MEDICATIONS  (STANDING):  artificial tears (preservative free) Ophthalmic Solution 1 Drop(s) Both EYES two times a day  ascorbic acid 500 milliGRAM(s) Oral daily  aspirin enteric coated 81 milliGRAM(s) Oral daily  benzonatate 100 milliGRAM(s) Oral every 8 hours  collagenase Ointment 1 Application(s) Topical daily  methimazole 2.5 milliGRAM(s) Oral daily  multivitamin 1 Tablet(s) Oral daily  polyethylene glycol 3350 17 Gram(s) Oral two times a day  rivaroxaban 15 milliGRAM(s) Oral with dinner  senna 2 Tablet(s) Oral at bedtime  sotalol. 80 milliGRAM(s) Oral <User Schedule>    MEDICATIONS  (PRN):            Vital Signs Last 24 Hrs  T(C): 36 (04 Jan 2023 06:00), Max: 36.8 (03 Jan 2023 18:17)  T(F): 96.8 (04 Jan 2023 06:00), Max: 98.3 (03 Jan 2023 18:17)  HR: 91 (04 Jan 2023 06:00) (79 - 91)  BP: 120/68 (04 Jan 2023 06:00) (89/40 - 124/71)  BP(mean): --  RR: 17 (04 Jan 2023 06:00) (17 - 18)  SpO2: 97% (04 Jan 2023 06:00) (97% - 100%)    Parameters below as of 04 Jan 2023 06:00  Patient On (Oxygen Delivery Method): room air                INTERPRETATION OF TELEMETRY: Afib at 90s    ECG:        LABS:                        8.1    6.50  )-----------( 96       ( 04 Jan 2023 06:10 )             26.8     01-04    136  |  107  |  13  ----------------------------<  88  4.5   |  22  |  0.52    Ca    7.5<L>      04 Jan 2023 06:10  Phos  2.8     01-04  Mg     2.10     01-04          PT/INR - ( 03 Jan 2023 06:17 )   PT: 31.1 sec;   INR: 2.65 ratio         PTT - ( 03 Jan 2023 06:17 )  PTT:38.0 sec    I&O's Summary    BNP  RADIOLOGY & ADDITIONAL STUDIES:      PHYSICAL EXAM:    GENERAL: In no apparent distress, well nourished, and hydrated.  HEART: Irregular rate and rhythm; No murmurs, rubs, or gallops.  PULMONARY: Clear to auscultation and percussion.    ABDOMEN: Soft, Nontender, Nondistended; Bowel sounds present  EXTREMITIES:  Peripheral Pulses present,   NEUROLOGICAL: Grossly nonfocal

## 2023-01-04 NOTE — PROVIDER CONTACT NOTE (OTHER) - DATE AND TIME:
28-Dec-2022 02:18
03-Jan-2023 23:18
03-Jan-2023 23:31
27-Dec-2022 20:40
04-Jan-2023 00:58
28-Dec-2022 10:20
03-Jan-2023 10:10
27-Dec-2022 17:25
28-Dec-2022 09:06

## 2023-01-04 NOTE — PROGRESS NOTE ADULT - PROBLEM SELECTOR PLAN 5
DVT: Xarelto  Diet: minced and moist  Dispo: PT recommending rehab, to be discussed with daughter and to be reassessed pending evaluation of need for permanent pacemaker placement DVT: Xarelto  Diet: minced and moist  Dispo: rehab  Code status: DNR/DNI

## 2023-01-04 NOTE — PROGRESS NOTE ADULT - PROBLEM SELECTOR PLAN 2
Pt with hx of Afib on ASA and xarelto. Sotalol for rhythm control  - EP following, appreciate recs  - monitor on tele  - Hold sotalol  - c/w ASA/Xarelto given CHADSVASC score of 3  - Keep K > 4, Mg > 2 Pt with hx of Afib on ASA and xarelto. Sotalol for rhythm control  - EP following, appreciate recs  - monitor on tele  - c/w ASA/Xarelto given CHADSVASC score of 3  - Keep K > 4, Mg > 2  - PPM as above Pt with hx of Afib on ASA and xarelto. Sotalol for rhythm control  - EP following, appreciate recs  - monitor on tele  - c/w ASA/Xarelto given CHADSVASC score of 3  - Keep K > 4, Mg > 2  - PPM as above  - planning on resuming sotalol 80 mg daily with hold parameters SBP <115 per EP

## 2023-01-04 NOTE — PROVIDER CONTACT NOTE (OTHER) - RECOMMENDATIONS
Assess patient. Review patient's orders, labs, history & plan. Address patient's Heart Rate and Rhythm.
Provider notified
Provider notified
Assess patient. Review patient's orders, labs, history & plan. Address patient's Heart Rate and Rhythm.
Provider notified
Provider notified

## 2023-01-04 NOTE — PROGRESS NOTE ADULT - PROBLEM SELECTOR PLAN 1
Pt presenting with syncopal episode i/s/o earing suggestive of situational syncope/vasovagal i/s/o swallowing. Tele with slow Afib with frequent episodes of ventricular pauses (longest pause 3.1 seconds though asymptomatic, stable BP) raising concern for cardiac syncope. tsh only slightly elevated, so hypothyroidism less likely to be contributory; no evident electrolyte derangements to explain arrhythmia   TTE with severe TR, MR. Severely dilated LA. Normal LV systolic function. Reduced RV systolic function. Severe pulmonary HTN--per cardiology, valvulopathy is chronic and does not explain syncope   - EP following, appreciate recs  - Monitor on Tele  - Hold sotalol  - replete lytes as needed  - EP planning for PPM 1/3 Pt presenting with syncopal episode i/s/o earing suggestive of situational syncope/vasovagal i/s/o swallowing. Tele with slow Afib with frequent episodes of ventricular pauses (longest pause 3.1 seconds though asymptomatic, stable BP) raising concern for cardiac syncope. tsh only slightly elevated, so hypothyroidism less likely to be contributory; no evident electrolyte derangements to explain arrhythmia   TTE with severe TR, MR. Severely dilated LA. Normal LV systolic function. Reduced RV systolic function. Severe pulmonary HTN--per cardiology, valvulopathy is chronic and does not explain syncope   - Monitor on Tele  - replete lytes as needed  - s/p leadless Micra PPM on 1/3 by EP  - planning on resuming sotalol with hold parameters SBP <115  - will continue to monitor access site for signs of bleeding or infection Pt presenting with syncopal episode i/s/o earing suggestive of situational syncope/vasovagal i/s/o swallowing. Tele with slow Afib with frequent episodes of ventricular pauses (longest pause 3.1 seconds though asymptomatic, stable BP) raising concern for cardiac syncope. tsh only slightly elevated, so hypothyroidism less likely to be contributory; no evident electrolyte derangements to explain arrhythmia   TTE with severe TR, MR. Severely dilated LA. Normal LV systolic function. Reduced RV systolic function. Severe pulmonary HTN--per cardiology, valvulopathy is chronic and does not explain syncope   - Monitor on Tele  - replete lytes as needed  - s/p leadless Micra PPM on 1/3 by EP  - planning on resuming sotalol 80 mg daily with hold parameters SBP <115 per EP  - will continue to monitor access site for signs of bleeding or infection

## 2023-01-04 NOTE — PROGRESS NOTE ADULT - ATTENDING COMMENTS
Seen and examined by me this afternoon, daughter at bedside who states her mother is doing better, appetite has increased  EP follow up appreciated, resuming sotalol at this time at a lower dose (80mg QD) with holding parameters, resuming xarelto this afternoon as well  TTE shows normal EF but shows-severe MR, mild AS, mild-mod AI, severely dilated LA, severe TR, severe pulmonary HTN  Syncope, likely due to pauses episodes (sotalol related) and TTE findings likely also contributing to syncope-severe MR and severe TR, s/p PPM  Patient remains on A.Fib-permanent atrial fibrillation, resuming xarelto as above  Elevated TSH/Free T4 on low side of normal, apprec Endocrine recs (verbal recs), c/w low dose of methimazole 2.5mg QD  Thrombocytopenia with no e/o active bleeding, has not been on heparin, monitor for now  PT evaluation --> DEISY, will need insurance authorization  Discussed with daughter at bedside  Rest as above

## 2023-01-04 NOTE — PROGRESS NOTE ADULT - ASSESSMENT
88 yo F with a PMH of AF on Xarelto & Sotalol, colitis, DVT, mult wounds to foot and sacrum w/ recent admission for bloody BM a/w sepsis; found to be +for influenza then readmitted on 12/27/2022 for syncope likely in the setting of pauses, hosp course c/b by hyperkalemia, LLE vascular concerns vs cellulitis, transaminitis & hyperthyroidism. Dx'd w/ TBS, now NPO and consented for leadless Micra, CHA2 DS2 Vasc, 3:    - s/p Micra yesterday, CXR with no PTX   R groin site  was pressure dressed. It was  clean, dry, and intact. No bleeding, drainage hematoma appreciated.    peripheral pusle : present    Post-op PPM instruction has been verbally explained and given to the patient's family daughter Laura Pandey ([phone]). Patient was also given home monitor, booklet and ID card. Patient's daughter expressed understanding and all questions were answered. A copy of the instruction is located in the patients chart   Patient is schedule for an appointment on 1/19/23 at 9:40 am in the EP Clinic ( 4th floor Oncology building [facility] [address], [place], [place], [address] [phone] )  - recommend to resume Sotalol 80mg daily post implant if BP tolerates  - resume full dose AC w/ Xarelto 15 QHS as CrCl < 51 on 1/4/23  - continue telemetry  - keep K+ 4.0 - 4.5 and Mg 2.0 - 2.5  - continue care per primary team 86 yo F with a PMH of AF on Xarelto & Sotalol, colitis, DVT, mult wounds to foot and sacrum w/ recent admission for bloody BM a/w sepsis; found to be +for influenza then readmitted on 12/27/2022 for syncope likely in the setting of pauses, hosp course c/b by hyperkalemia, LLE vascular concerns vs cellulitis, transaminitis & hyperthyroidism. Dx'd w/ TBS, now NPO and consented for leadless Micra, CHA2 DS2 Vasc, 3:    - s/p Micra yesterday, CXR with no PTX   R groin site  was pressure dressed. It was  clean, dry, and intact. No bleeding, drainage hematoma appreciated.    peripheral pusle : present    Post-op PPM instruction has been verbally explained and given to the patient's family daughter Laura Pandey ([phone]). Patient was also given home monitor, booklet and ID card. Patient's daughter expressed understanding and all questions were answered. A copy of the instruction is located in the patients chart   Patient is schedule for an appointment on 1/19/23 at 9:40 am in the EP Clinic ( 4th floor Oncology building [facility] [address], [address], [place], [address] [phone] )  - recommend to resume Sotalol 80mg BID with holding parameter SBP <100 post implant if BP tolerates  - resume full dose AC w/ Xarelto 15 QHS as CrCl < 51 on 1/4/23  - continue telemetry  - keep K+ 4.0 - 4.5 and Mg 2.0 - 2.5  - continue care per primary team

## 2023-01-04 NOTE — PROVIDER CONTACT NOTE (OTHER) - REASON
Patient has more very light serosanguineous drainage on dressing
3.1 second pause on tele monitor
Ectopy. Heart Rate.
Patient failed dysphagia screen, has wet cough
Patient yony down to 28
Patient's BP 94/47, heart rate 79
Heart Rate.
Patient's BP 89/40, dose of Sotalol held
Patient's dressing has a bit more serosanguinous discharge

## 2023-01-04 NOTE — PROVIDER CONTACT NOTE (OTHER) - BACKGROUND
Patient s/p ppm from 1/3/23, patient's admitting diagnosis of syncope and collapse, pmh of colon cancer, HLD, UTI
Patient admitted for Syncope and Collapse, Venous Insufficiency, Cellulitis, Atrial Fibrillation.
Patient admitted for syncopal episode. Hx of Afib, dementia
Patient s/p ppm, admitting diagnosis syncope and collapse, pmh of colon cancer, HLD
Pt s/p ppm, pmh of HLD colon cancer, admitting diagnosis syncope and collapse
admitted s/p syncopal episode
Patient admitted for Syncope and Collapse, Venous Insufficiency, Cellulitis, Atrial Fibrillation.
Patient s/p ppm, admitting diagnosis syncope and collapse, pmh of uti, colon cancer
admitted s/p syncopal episode

## 2023-01-04 NOTE — PROVIDER CONTACT NOTE (OTHER) - SITUATION
Patient's Heart Rate frequently decreasing briefly to the 30's and patient is having pauses & the longest lasted 3 seconds on the cardiac monitor.
Patient's R groin dressing has a bit of serosanguinous discharge
3.1 second pause on tele monitor
Patient's Heart Rate increases frequent briefly to as high as 160's on the cardiac monitor and Heart Rhythm is Atrial Fibrillation on the cardiac monitor.
Patient failed dysphagia screen, has wet cough
Patient yony down to 28
Patient BP 94/47, HR 79, notified provider as per parameters
Patient's BP 89/40, HR 83
Patient's dressing has more very light serosanguineous drainage on dressing

## 2023-01-04 NOTE — PROVIDER CONTACT NOTE (OTHER) - NAME OF MD/NP/PA/DO NOTIFIED:
Inocencio Wright
Chaim Alva
Inocencio Wright
MD Inocencio Wright via teams
Inocencio Wright
MD Liz Odom via teams
Chaim Alva

## 2023-01-05 NOTE — CHART NOTE - NSCHARTNOTEFT_GEN_A_CORE
Review of telemetry revealed Afib/flutter with rates in the 80-90s.   - see EP recs from 12/30    Harry Tolbert MD  Chief Cardiology Fellow PGY-6  [facility]    Notes are not final until signed by attending  For all consults and questions:  [email or web address]   Login: [email or web address]
Source: Patient A&Ox3-Palestinian speaking    Family [ ]     RN [x ]    Chart [x ]    Reason for Follow-Up Assessment: severe protein calorie malnutrition     Per RN, pt is eating about 50% of meals and drinking sips of Ensure supplements. Daughter usually at bedside feeding patient. Minced and moist diet per swallow evaluation on 12/28. No reported GI issues (nausea/vomiting/diarrhea/constipation.)     87-year-old female past medical history of A. fib (on aspirin and Xarelto) on Sotalol, cholecystectomy, hemorrhoids, colitis, recent admission for bloody bowel movement and sepsis secondary to influenza, presents with syncopal episode likely cardiac in etiology. Tele with slow afib with frequent ventricular pauses that persist despite discontinuation of sotalol. The patient is pending telemetry evaluation with ambulation to assess for persistent arrythmia despite adrenergic surge and for the necessity of pacemaker placement.        GI: WDL.     PO intake:  < 50% [ ] 50-75% [x ]   % [ ]  other :    Weight (kg): 49.8 (12-28), 45.8 (12-09), 44.1 (11-28)      Edema: 1+ B/L LE   Pressure Injuries: coccyx stage IV, R heel stage 1, L heel SDTI,     __________________ Pertinent Medications__________________   MEDICATIONS  (STANDING):  artificial tears (preservative free) Ophthalmic Solution 1 Drop(s) Both EYES two times a day  ascorbic acid 500 milliGRAM(s) Oral daily  aspirin enteric coated 81 milliGRAM(s) Oral daily  benzonatate 100 milliGRAM(s) Oral every 8 hours  collagenase Ointment 1 Application(s) Topical daily  methimazole 2.5 milliGRAM(s) Oral daily  multivitamin 1 Tablet(s) Oral daily  polyethylene glycol 3350 17 Gram(s) Oral two times a day  rivaroxaban 15 milliGRAM(s) Oral with dinner  senna 2 Tablet(s) Oral at bedtime  sotalol. 80 milliGRAM(s) Oral every 12 hours    MEDICATIONS  (PRN):      __________________ Pertinent Labs__________________   01-05 Na137 mmol/L Glu 76 mg/dL K+ 4.5 mmol/L Cr  0.46 mg/dL<L> BUN 10 mg/dL 01-05 Phos 1.9 mg/dL<L> 12-09 Chol 98 mg/dL LDL --    HDL 33 mg/dL<L> Trig 64 mg/dL        POCT Blood Glucose.: 99 mg/dL (01-03-23 @ 06:48)  POCT Blood Glucose.: 103 mg/dL (01-02-23 @ 12:31)          Estimated Needs:   [x ] no change since previous assessment      Previous Nutrition Diagnosis: severe protein calorie malnutrition     Nutrition Diagnosis is [x ] ongoing      Recommendations:  1. Continue current diet order.  2. Updated weight   3. Encourage PO intake and honor food preferences as able.   4. Continue to document PO in RN flow sheets and monitor weekly weights.   5. Continue multivitamin and Vitamin C 500 mg 2x daily.       Monitoring and Evaluation:      [ x] Tolerance to diet prescription [x ] weights [x ] follow up per protocol  [ ] other:
The patient is s/p micro PPM. As per Dr Fofana may resume Xarelto 15 mg tomorrow on January, 4 at 5pm.
Review of telemetry revealed Afib/flutter with rates in the 90s.   - see EP recs from 12/30    Harry Tolbert MD  Chief Cardiology Fellow PGY-6  [facility]    Notes are not final until signed by attending  For all consults and questions:  [email or web address]   Login: [email or web address].

## 2023-01-05 NOTE — PROGRESS NOTE ADULT - REASON FOR ADMISSION
Syncope

## 2023-01-05 NOTE — PROGRESS NOTE ADULT - SUBJECTIVE AND OBJECTIVE BOX
Interval history:   No acute overnight event  R groin site clean  Tele reveals Afib, rate controlled      PAST MEDICAL & SURGICAL HISTORY:  Atrial fibrillation    UTI (urinary tract infection)    Hypothyroidism    Colon cancer    Hemorrhoids    HLD (hyperlipidemia)    History of cholecystectomy    Perforated appendicitis    History of cataract surgery        MEDICATIONS  (STANDING):  artificial tears (preservative free) Ophthalmic Solution 1 Drop(s) Both EYES two times a day  ascorbic acid 500 milliGRAM(s) Oral daily  aspirin enteric coated 81 milliGRAM(s) Oral daily  benzonatate 100 milliGRAM(s) Oral every 8 hours  collagenase Ointment 1 Application(s) Topical daily  methimazole 2.5 milliGRAM(s) Oral daily  multivitamin 1 Tablet(s) Oral daily  polyethylene glycol 3350 17 Gram(s) Oral two times a day  rivaroxaban 15 milliGRAM(s) Oral with dinner  senna 2 Tablet(s) Oral at bedtime  sotalol. 80 milliGRAM(s) Oral every 12 hours    MEDICATIONS  (PRN):            Vital Signs Last 24 Hrs  T(C): 36.2 (05 Jan 2023 05:40), Max: 36.8 (04 Jan 2023 12:16)  T(F): 97.1 (05 Jan 2023 05:40), Max: 98.2 (04 Jan 2023 12:16)  HR: 86 (05 Jan 2023 05:40) (83 - 90)  BP: 101/63 (05 Jan 2023 05:40) (101/63 - 121/71)  BP(mean): --  RR: 17 (05 Jan 2023 05:40) (17 - 17)  SpO2: 97% (05 Jan 2023 05:40) (97% - 98%)    Parameters below as of 05 Jan 2023 05:40  Patient On (Oxygen Delivery Method): room air                INTERPRETATION OF TELEMETRY: Afib at 80s-90s mostly, brief episode to 110s,  with occ. PVCs    ECG:        LABS:                        8.1    6.50  )-----------( 96       ( 04 Jan 2023 06:10 )             26.8     01-05    137  |  107  |  10  ----------------------------<  76  4.5   |  25  |  0.46<L>    Ca    7.4<L>      05 Jan 2023 06:00  Phos  1.9     01-05  Mg     2.00     01-05              I&O's Summary    BNP  RADIOLOGY & ADDITIONAL STUDIES:      PHYSICAL EXAM:    GENERAL: In no apparent distress, well nourished, and hydrated.  HEART: Irregular rate and rhythm; No murmurs, rubs, or gallops.  PULMONARY: Clear to auscultation and percussion.    ABDOMEN: Soft, Nontender, Nondistended; Bowel sounds present  EXTREMITIES:  Peripheral Pulses present,   NEUROLOGICAL: Grossly nonfocal

## 2023-01-05 NOTE — PROGRESS NOTE ADULT - ASSESSMENT
86 yo F with a PMH of AF on Xarelto & Sotalol, colitis, DVT, mult wounds to foot and sacrum w/ recent admission for bloody BM a/w sepsis; found to be +for influenza then readmitted on 12/27/2022 for syncope likely in the setting of pauses, hosp course c/b by hyperkalemia, LLE vascular concerns vs cellulitis, transaminitis & hyperthyroidism. Dx'd w/ TBS, now NPO and consented for leadless Micra, CHA2 DS2 Vasc, 3: Now s/p Micra pacemaker and R groin site clean and dry with no hematoma.      - Post-op PPM instruction given and Patient is schedule for an appointment on 1/19/23 at 9:40 am in the [facility]   - recommend to resume Sotalol 80mg BID with holding parameter SBP <100 post implant if BP tolerates  - Continue AC w/ Xarelto 15 QHS as CrCl < 51   - continue telemetry, currently in Afib with rate controlled  - keep K+ 4.0 - 4.5 and Mg 2.0 - 2.5  - continue care per primary team

## 2023-01-05 NOTE — PROGRESS NOTE ADULT - PROBLEM SELECTOR PLAN 1
Pt presenting with syncopal episode i/s/o earing suggestive of situational syncope/vasovagal i/s/o swallowing. Tele with slow Afib with frequent episodes of ventricular pauses (longest pause 3.1 seconds though asymptomatic, stable BP) raising concern for cardiac syncope. tsh only slightly elevated, so hypothyroidism less likely to be contributory; no evident electrolyte derangements to explain arrhythmia   TTE with severe TR, MR. Severely dilated LA. Normal LV systolic function. Reduced RV systolic function. Severe pulmonary HTN--per cardiology, valvulopathy is chronic and does not explain syncope   - Monitor on Tele  - replete lytes as needed  - s/p leadless Micra PPM on 1/3 by EP  - planning on resuming sotalol 80 mg daily with hold parameters SBP <115 per EP  - will continue to monitor access site for signs of bleeding or infection Pt presenting with syncopal episode i/s/o earing suggestive of situational syncope/vasovagal i/s/o swallowing. Tele with slow Afib with frequent episodes of ventricular pauses (longest pause 3.1 seconds though asymptomatic, stable BP) raising concern for cardiac syncope. tsh only slightly elevated, so hypothyroidism less likely to be contributory; no evident electrolyte derangements to explain arrhythmia   TTE with severe TR, MR. Severely dilated LA. Normal LV systolic function. Reduced RV systolic function. Severe pulmonary HTN--per cardiology, valvulopathy is chronic and does not explain syncope   - Monitor on Tele  - replete lytes as needed  - s/p leadless Micra PPM on 1/3 by EP  - increase sotalol to 80 mg bid with hold parameters SBP <110  - will continue to monitor access site for signs of bleeding or infection

## 2023-01-05 NOTE — PROGRESS NOTE ADULT - NUTRITIONAL ASSESSMENT
This patient has been assessed with a concern for Malnutrition and has been determined to have a diagnosis/diagnoses of Severe protein-calorie malnutrition.    This patient is being managed with:   Diet Minced and Moist-  Supplement Feeding Modality:  Oral  Ensure Plus High Protein Cans or Servings Per Day:  1       Frequency:  Daily  Entered: Dec 29 2022  1:34PM    
This patient has been assessed with a concern for Malnutrition and has been determined to have a diagnosis/diagnoses of Severe protein-calorie malnutrition.    This patient is being managed with:   Diet NPO after Midnight-     NPO Start Date: 02-Jan-2023   NPO Start Time: 23:59  Except Medications  Entered: Jan 2 2023 11:10AM    Diet Minced and Moist-  Supplement Feeding Modality:  Oral  Ensure Plus High Protein Cans or Servings Per Day:  1       Frequency:  Daily  Entered: Dec 29 2022  1:34PM    
This patient has been assessed with a concern for Malnutrition and has been determined to have a diagnosis/diagnoses of Severe protein-calorie malnutrition.    This patient is being managed with:   Diet Minced and Moist-  Supplement Feeding Modality:  Oral  Ensure Plus High Protein Cans or Servings Per Day:  1       Frequency:  Daily  Entered: Dec 29 2022  1:34PM

## 2023-01-05 NOTE — PROGRESS NOTE ADULT - NS ATTEND AMEND GEN_ALL_CORE FT
This is a 87-year-old woman with a pmhx A. fib  (Xarelto and Sotalol), cholecystectomy, hemorrhoids, colitis, recent admission for bloody bowel movement and sepsis secondary to influenza who presented with a syncopal episode. Per daughter, patient was being fed and then had brief syncopal episode before regaining consciousness. Patient brought to the ED for eval. In the ED, noted to have slow Afib on monitor with episodes of pauses occurring on telemetry review. Labs significant for hyperkalemia and elevated TSH and low T3 and FT4. Continue rivaroxaban for hromboembolic ppx  given that patient has a [id number] score of 3. Discontinue home sotalol due to concern about pause and bradycardia
86 yo F with a PMH of AF on Xarelto & Sotalol, colitis, DVT, mult wounds to foot and sacrum w/ recent admission for bloody BM a/w sepsis; found to be +for influenza then readmitted on 12/27/2022 for syncope likely in the setting of pauses, hosp course c/b by hyperkalemia, LLE vascular concerns vs cellulitis, transaminitis & hyperthyroidism. Dx'd w/ TBS, now s/p micra leadless PPM. Restart sotalol. FU as outpt.
This is a 87-year-old woman with a pmhx A. fib  (Xarelto and Sotalol), cholecystectomy, hemorrhoids, colitis, recent admission for bloody bowel movement and sepsis secondary to influenza who presented with a syncopal episode. Per daughter, patient was being fed and then had brief syncopal episode before regaining consciousness. Patient brought to the ED for eval. In the ED, noted to have slow Afib on monitor with episodes of pauses occurring on telemetry review. Labs significant for hyperkalemia and elevated TSH and low T3 and FT4. Continue rivaroxaban for thromboembolic ppx  given that patient has a [id number] score of 3. Discontinue home sotalol due to concern about pause and bradycardia.
86 yo F with a PMH of AF on Xarelto & Sotalol, colitis, DVT, mult wounds to foot and sacrum w/ recent admission for bloody BM a/w sepsis; found to be +for influenza then readmitted on 12/27/2022 for syncope likely in the setting of pauses, hosp course c/b by hyperkalemia, LLE vascular concerns vs cellulitis, transaminitis & hyperthyroidism. Dx'd w/ TBS, now NPO and consented for leadless Micra, CHA2 DS2 Vasc, 3. Plan for today.
88 yo F with a PMH of AF on Xarelto & Sotalol, colitis, DVT, mult wounds to foot and sacrum w/ recent admission for bloody BM a/w sepsis; found to be +for influenza then readmitted on 12/27/2022 for syncope likely in the setting of pauses, hosp course c/b by hyperkalemia, LLE vascular concerns vs cellulitis, transaminitis & hyperthyroidism. Dx'd w/ TBS, now NPO and consented for leadless Micra, CHA2 DS2 Vasc, 3: Now s/p Micra pacemaker and R groin site clean and dry with no hematoma. Cont beta blockade and AC for AF.
This is a 87-year-old woman with a pmhx A. fib  (Xarelto and Sotalol), cholecystectomy, hemorrhoids, colitis, recent admission for bloody bowel movement and sepsis secondary to influenza who presented with a syncopal episode. Per daughter, patient was being fed and then had brief syncopal episode before regaining consciousness. Patient brought to the ED for eval. In the ED, noted to have slow Afib on monitor with episodes of pauses occurring on telemetry review. Labs significant for hyperkalemia and elevated TSH and low T3 and FT4.  Continue rivaroxaban for thromboembolic ppx  given that patient has a [id number] score of 3. Discontinue home sotalol due to concern about pause and bradycardia.

## 2023-01-05 NOTE — DISCHARGE NOTE NURSING/CASE MANAGEMENT/SOCIAL WORK - NSDCPEFALRISK_GEN_ALL_CORE
For information on Fall & Injury Prevention, visit: [email or web address] OR  [email or web address] OR  https://www.cdc.gov/steadi/patient.html

## 2023-01-05 NOTE — PROGRESS NOTE ADULT - PROBLEM SELECTOR PLAN 3
Initially concerned for arterial occlusion in R foot given blue appearance and concern for cellulitis in L foot given erythema and swelling. Redness improved with elevation of the leg. All this is suggestive of a more chronic disease process, likely chronic venous insufficiency +/- Raynaud phenomenon     - initial concern for arterial occlusion of R foot given cold blue foot. CTA with aortic runoff to b/l feet with no arterial occlusion in LE. On reassessment, foot no longer blue, now red   - initial concern for cellulitis of L foot however now less likely given above reasoning, will dc cefazolin and monitor off abx, if clinically worsening, febrile, or uptrending leukocytosis, will consider restarting abx

## 2023-01-05 NOTE — PROGRESS NOTE ADULT - NS ATTEND OPT1 GEN_ALL_CORE
I attest my time as attending is greater than 50% of the total combined time spent on qualifying patient care activities by the PA/NP and attending.
I independently performed the documented:
I attest my time as attending is greater than 50% of the total combined time spent on qualifying patient care activities by the PA/NP and attending.
I attest my time as attending is greater than 50% of the total combined time spent on qualifying patient care activities by the PA/NP and attending.
I independently performed the documented:
I independently performed the documented:

## 2023-01-05 NOTE — PROGRESS NOTE ADULT - ATTENDING COMMENTS
Seen and examined by me this afternoon, doing well, tolerating sotalol, BP is slightly better, per RN pt did not eat this morning much  EP follow up appreciated, c/w sotalol at this time at a lower dose (80mg BID) with holding parameters, C/w xarelto  TTE shows normal EF but shows-severe MR, mild AS, mild-mod AI, severely dilated LA, severe TR, severe pulmonary HTN  Syncope, likely due to pauses episodes (sotalol related) and TTE findings likely also contributing to syncope-severe MR and severe TR, s/p PPM  Patient remains on A.Fib-permanent atrial fibrillation, c/w xarelto  Elevated TSH/Free T4 on low side of normal, apprec Endocrine recs (verbal recs), c/w low dose of methimazole 2.5mg QD  Thrombocytopenia with no e/o active bleeding, has not been on heparin, slightly better today  PT evaluation --> DEISY, apprec SW/CM assistance  Medically stable for discharge to [facility] today  PMD/EP f/up as outpatient  >30 min dc planning  Rest as above Seen and examined by me this afternoon, doing well, tolerating sotalol, BP is slightly better, per RN pt did not eat this morning much  EP follow up appreciated, c/w sotalol at this time at a lower dose (80mg BID) with holding parameters, C/w xarelto  TTE shows normal EF but shows-severe MR, mild AS, mild-mod AI, severely dilated LA, severe TR, severe pulmonary HTN  Syncope, likely due to pauses episodes (sotalol related) and TTE findings likely also contributing to syncope-severe MR and severe TR, s/p PPM  Patient remains on A.Fib-permanent atrial fibrillation, c/w xarelto  Elevated TSH/Free T4 on low side of normal, apprec Endocrine recs (verbal recs), c/w low dose of methimazole 2.5mg QD  Thrombocytopenia with no e/o active bleeding, has not been on heparin, slightly better today  PT evaluation --> DEISY, apprec SW/CM assistance  Medically stable for discharge to [facility] today  PMD/EP/Cards and Endocrine f/up as outpatient  >30 min dc planning  Rest as above

## 2023-01-05 NOTE — PROGRESS NOTE ADULT - PROVIDER SPECIALTY LIST ADULT
Electrophysiology
Internal Medicine
Electrophysiology
Internal Medicine
Electrophysiology
Internal Medicine

## 2023-01-05 NOTE — PROGRESS NOTE ADULT - SUBJECTIVE AND OBJECTIVE BOX
DATE OF SERVICE: 01-05-23 @ 06:59    Patient is a 87y old  Female who presents with a chief complaint of Syncope (04 Jan 2023 11:02)      SUBJECTIVE / OVERNIGHT EVENTS:  Overnight, sotalol frequency increased to 80 mg bid.   Pt seen and examined at bedside.    ROS negative except as above.    MEDICATIONS  (STANDING):  artificial tears (preservative free) Ophthalmic Solution 1 Drop(s) Both EYES two times a day  ascorbic acid 500 milliGRAM(s) Oral daily  aspirin enteric coated 81 milliGRAM(s) Oral daily  benzonatate 100 milliGRAM(s) Oral every 8 hours  collagenase Ointment 1 Application(s) Topical daily  methimazole 2.5 milliGRAM(s) Oral daily  multivitamin 1 Tablet(s) Oral daily  polyethylene glycol 3350 17 Gram(s) Oral two times a day  rivaroxaban 15 milliGRAM(s) Oral with dinner  senna 2 Tablet(s) Oral at bedtime  sotalol. 80 milliGRAM(s) Oral every 12 hours    MEDICATIONS  (PRN):      Vital Signs Last 24 Hrs  T(C): 36.2 (05 Jan 2023 05:40), Max: 36.8 (04 Jan 2023 12:16)  T(F): 97.1 (05 Jan 2023 05:40), Max: 98.2 (04 Jan 2023 12:16)  HR: 86 (05 Jan 2023 05:40) (83 - 90)  BP: 101/63 (05 Jan 2023 05:40) (101/63 - 121/71)  BP(mean): --  RR: 17 (05 Jan 2023 05:40) (17 - 17)  SpO2: 97% (05 Jan 2023 05:40) (97% - 98%)    Parameters below as of 05 Jan 2023 05:40  Patient On (Oxygen Delivery Method): room air      CAPILLARY BLOOD GLUCOSE        I&O's Summary      PHYSICAL EXAM:  GENERAL: NAD, well-developed  HEAD:  Atraumatic, Normocephalic  EYES: EOMI, PERRLA, conjunctiva and sclera clear  NECK: Supple, No JVD  CHEST/LUNG: Clear to auscultation bilaterally; No wheeze  HEART: Regular rate and rhythm; No murmurs, rubs, or gallops  ABDOMEN: Soft, Nontender, Nondistended; Bowel sounds present  EXTREMITIES:  2+ Peripheral Pulses, No clubbing, cyanosis, or edema  PSYCH: AAOx3  NEUROLOGY: non-focal  SKIN: No rashes or lesions    LABS:                        8.1    6.50  )-----------( 96       ( 04 Jan 2023 06:10 )             26.8     01-04    136  |  107  |  13  ----------------------------<  88  4.5   |  22  |  0.52    Ca    7.5<L>      04 Jan 2023 06:10  Phos  2.8     01-04  Mg     2.10     01-04                MICRO:      RADIOLOGY & ADDITIONAL TESTS:      Consultant(s) Notes Reviewed:         DATE OF SERVICE: 01-05-23 @ 06:59    Patient is a 87y old  Female who presents with a chief complaint of Syncope (04 Jan 2023 11:02)      SUBJECTIVE / OVERNIGHT EVENTS:  Overnight, sotalol frequency increased to 80 mg bid.   Pt seen and examined at bedside. This morning pt is cold, requesting another blanket, otherwise no complaints.    ROS could not be completed given inability to hear Surinamese .     MEDICATIONS  (STANDING):  artificial tears (preservative free) Ophthalmic Solution 1 Drop(s) Both EYES two times a day  ascorbic acid 500 milliGRAM(s) Oral daily  aspirin enteric coated 81 milliGRAM(s) Oral daily  benzonatate 100 milliGRAM(s) Oral every 8 hours  collagenase Ointment 1 Application(s) Topical daily  methimazole 2.5 milliGRAM(s) Oral daily  multivitamin 1 Tablet(s) Oral daily  polyethylene glycol 3350 17 Gram(s) Oral two times a day  rivaroxaban 15 milliGRAM(s) Oral with dinner  senna 2 Tablet(s) Oral at bedtime  sotalol. 80 milliGRAM(s) Oral every 12 hours    MEDICATIONS  (PRN):      Vital Signs Last 24 Hrs  T(C): 36.2 (05 Jan 2023 05:40), Max: 36.8 (04 Jan 2023 12:16)  T(F): 97.1 (05 Jan 2023 05:40), Max: 98.2 (04 Jan 2023 12:16)  HR: 86 (05 Jan 2023 05:40) (83 - 90)  BP: 101/63 (05 Jan 2023 05:40) (101/63 - 121/71)  BP(mean): --  RR: 17 (05 Jan 2023 05:40) (17 - 17)  SpO2: 97% (05 Jan 2023 05:40) (97% - 98%)    Parameters below as of 05 Jan 2023 05:40  Patient On (Oxygen Delivery Method): room air      CAPILLARY BLOOD GLUCOSE        I&O's Summary      PHYSICAL EXAM:  GENERAL: frail, elderly, demented woman, NAD  HEAD:  Atraumatic, Normocephalic  EYES: EOMI, PERRLA, conjunctiva and sclera clear  NECK: Supple, No JVD  CHEST/LUNG: Clear to auscultation bilaterally; No wheeze  HEART: Regular rate and rhythm; No murmurs, rubs, or gallops  ABDOMEN: Soft, Nontender, Nondistended; Bowel sounds present, paraumbilical hernia noted, soft NT reducible  EXTREMITIES:  R femoral access site with no signs of bleeding or infection  PSYCH: AAOx3  NEUROLOGY: non-focal  SKIN: No rashes or lesions    LABS:                        8.1    6.50  )-----------( 96       ( 04 Jan 2023 06:10 )             26.8     01-04    136  |  107  |  13  ----------------------------<  88  4.5   |  22  |  0.52    Ca    7.5<L>      04 Jan 2023 06:10  Phos  2.8     01-04  Mg     2.10     01-04                MICRO:      RADIOLOGY & ADDITIONAL TESTS:      Consultant(s) Notes Reviewed:

## 2023-01-19 PROBLEM — Z86.19 HISTORY OF SEPSIS: Status: RESOLVED | Noted: 2023-01-01 | Resolved: 2023-01-01

## 2023-01-19 PROBLEM — I48.91 A-FIB: Status: ACTIVE | Noted: 2023-01-01

## 2023-01-19 PROBLEM — R55 SYNCOPE: Status: ACTIVE | Noted: 2023-01-01

## 2023-01-19 PROBLEM — E05.90 HYPERTHYROIDISM: Status: ACTIVE | Noted: 2023-01-01

## 2023-01-19 PROBLEM — I45.5 SINUS PAUSE: Status: ACTIVE | Noted: 2023-01-01

## 2023-03-16 ENCOUNTER — APPOINTMENT (OUTPATIENT)
Dept: ELECTROPHYSIOLOGY | Facility: CLINIC | Age: 88
End: 2023-03-16

## 2023-03-19 ENCOUNTER — FORM ENCOUNTER (OUTPATIENT)
Age: 88
End: 2023-03-19

## 2023-04-20 ENCOUNTER — APPOINTMENT (OUTPATIENT)
Dept: ELECTROPHYSIOLOGY | Facility: CLINIC | Age: 88
End: 2023-04-20

## 2023-05-31 NOTE — ED ADULT NURSE NOTE - NS PRO PASSIVE SMOKE EXP
No
Right-sided abdominal pain concerning for acute cholecystitis versus acute appendicitis.  Start with CT scan and basic blood work and pain control.–Felix Schneider

## 2023-08-17 NOTE — H&P ADULT - PROBLEM SELECTOR PROBLEM 4
Does Ale want a late cancel letter sent to patient. Patient had an appointment with Ale at 4:00 on 8/16/2023. Patient called at 3:47 pm on 8/16/2023 stating they went to wrong appointment. Please send message back to writer of message.   
No letter this time.    Thanks  MB  
Noted  
HF (heart failure)

## 2023-09-12 NOTE — CONSULT NOTE ADULT - ASSESSMENT
Tried to contact patient via phone. Left message to call office back or send patient portal message.    Assessment/Plan: 87F PMH rectal cancer s/p polypectomy (2015), afib on xarelto, HTN, HLD p/w 2 episodes of melena. CT scan findings with concern of thrombosed hemorrhoid.  Followed by gen surgery and colorectal surgery.    Wound Consult requested to assist w/ management of sacral stage 4 pressure injury  -Small stage 4 pressure injury  -Wound base 100% pink-granular  -bone in close proximity, no visible or palpable.  - (+) minimal undermining  - No purulent drainage, no odor.  - No associated cellulitis.  - Periwound skin no fluctuance, no induration, no crepitus, no erythema, no edema.  - Small serosanguinous drainage, no odor. No bleeding.  - Topical recommendations: Cleanse with NS, pat dry. Apply Liquid barrier film to periwound skin. Pack with aquacel hyrofiber, cover with silicone foam with border. Change daily and prn.  - Continue to offload pressure  - Perineal care per protocol  - nutrition recommendations appreciated for severe protein calorie malnutrition; for optimization.    Upon discharge f/u as outpatient at [facility] [address] [place], [place] [phone]  Findings and plan discussed with patient, patient's daughter at bedside and primary team. All questions and concerns addressed.  Will continue to follow periodically while in patient, please reconsult earlier as needed.  Remainder of care per primary team.  Thank you for this consult  CAIN Mariano, CWOCN (pager #[phone])    If after 4PM or before 7:30AM on Mon-Friday or weekend/holiday please contact general surgery for urgent matters.   Team A- [id number]   Team B- [id number]  For non-urgent matters e-mail [email or web address]    I spent 50 minutes face to face with this patient of which more than 50% of the time was spent counseling & coordinating care of this pt

## 2024-07-24 NOTE — SWALLOW BEDSIDE ASSESSMENT ADULT - MODE OF PRESENTATION
spoon/fed by clinician cup/fed by clinician Follow up with PMD and GI in 1-2 days.    Abdominal Pain    Many things can cause abdominal pain. Usually, abdominal pain is not caused by a disease and will improve without treatment. Your health care provider will do a physical exam and possibly order blood tests and imaging to help determine the seriousness of your pain. However, in many cases, no cause may be found and you may need further testing as an outpatient. Monitor your abdominal pain for any changes.     SEEK IMMEDIATE MEDICAL CARE IF YOU HAVE THE FOLLOWING SYMPTOMS: worsening abdominal pain, vomiting, diarrhea, inability to have bowel movements or pass gas, black or bloody stool, fever accompanying chest pain or back pain, or dizziness/lightheadedness.

## 2024-08-09 NOTE — ED PROVIDER NOTE - MDM ORDERS SUBMITTED SELECTION
Detail Level: Detailed Patient Specific Counseling (Will Not Stick From Patient To Patient): Monitor for changes or if growing or becomes sore Detail Level: Simple Detail Level: Generalized Labs/Imaging Studies

## 2024-09-27 NOTE — ED ADULT NURSE NOTE - NSFALLRSKASSESSDT_ED_ALL_ED
Attempted to return patients call, no answer no vm.      OK FOR HUB TO RELAY MESSAGE     Matilde Iglesias MD [facility]  Please call the patient regarding his abnormal result.  Tell him his kidney function was a bit decreased so I would like him to come in this week or next week and have that repeated.  Tell him to pump up the water intake.  PSA was low which is ideal, cholesterol looks great.      08-Dec-2022 23:37

## 2025-06-16 NOTE — PATIENT PROFILE ADULT - DEAF OR HARD OF HEARING?
[facility] provides services at a reduced cost to those who are determined to be eligible through [facility]’s financial assistance program. Information regarding [facility]’s financial assistance program can be found by going to [email or web address] or by calling [phone].
no

## (undated) DEVICE — LUBRICATING JELLY HR ONE SHOT 3G

## (undated) DEVICE — SALIVA EJECTOR (BLUE)

## (undated) DEVICE — GOWN LG

## (undated) DEVICE — BIOPSY FORCEP RADIAL JAW 4 STANDARD WITH NEEDLE

## (undated) DEVICE — TUBING IV SET GRAVITY 3Y 100" MACRO

## (undated) DEVICE — BASIN EMESIS 10IN GRADUATED MAUVE

## (undated) DEVICE — LINE BREATHE SAMPLNG

## (undated) DEVICE — BIOPSY FORCEP COLD DISP

## (undated) DEVICE — ELCTR GROUNDING PAD ADULT COVIDIEN

## (undated) DEVICE — TUBING SUCTION NONCONDUCTIVE 6MM X 12FT

## (undated) DEVICE — TUBING MEDI-VAC W MAXIGRIP CONNECTORS 1/4"X6'

## (undated) DEVICE — DRSG BANDAID 0.75X3"

## (undated) DEVICE — CATH IV SAFE BC 22G X 1" (BLUE)

## (undated) DEVICE — CONTAINER FORMALIN 10% 20ML

## (undated) DEVICE — DRSG 2X2

## (undated) DEVICE — ELCTR ECG CONDUCTIVE ADHESIVE

## (undated) DEVICE — CONTAINER FORMALIN 80ML YELLOW

## (undated) DEVICE — DRSG CURITY GAUZE SPONGE 4 X 4" 12-PLY NON-STERILE

## (undated) DEVICE — PACK IV START WITH CHG